# Patient Record
Sex: FEMALE | Race: WHITE | NOT HISPANIC OR LATINO | Employment: OTHER | URBAN - METROPOLITAN AREA
[De-identification: names, ages, dates, MRNs, and addresses within clinical notes are randomized per-mention and may not be internally consistent; named-entity substitution may affect disease eponyms.]

---

## 2017-01-01 ENCOUNTER — NURSING HOME VISIT (OUTPATIENT)
Dept: GERIATRICS | Facility: CLINIC | Age: 82
End: 2017-01-01
Payer: MEDICARE

## 2017-01-01 ENCOUNTER — NURSING HOME VISIT (OUTPATIENT)
Dept: FAMILY MEDICINE | Facility: CLINIC | Age: 82
End: 2017-01-01
Payer: MEDICARE

## 2017-01-01 ENCOUNTER — ALLIED HEALTH/NURSE VISIT (OUTPATIENT)
Dept: CARDIOLOGY | Facility: CLINIC | Age: 82
End: 2017-01-01
Payer: MEDICARE

## 2017-01-01 ENCOUNTER — OFFICE VISIT (OUTPATIENT)
Dept: OPHTHALMOLOGY | Facility: CLINIC | Age: 82
End: 2017-01-01

## 2017-01-01 VITALS
RESPIRATION RATE: 18 BRPM | DIASTOLIC BLOOD PRESSURE: 77 MMHG | HEIGHT: 62 IN | SYSTOLIC BLOOD PRESSURE: 151 MMHG | OXYGEN SATURATION: 95 % | TEMPERATURE: 97.8 F | HEART RATE: 65 BPM | WEIGHT: 134.5 LBS | BODY MASS INDEX: 24.75 KG/M2

## 2017-01-01 VITALS
OXYGEN SATURATION: 96 % | HEIGHT: 62 IN | TEMPERATURE: 97.8 F | BODY MASS INDEX: 25.03 KG/M2 | RESPIRATION RATE: 16 BRPM | HEART RATE: 60 BPM | WEIGHT: 136 LBS | DIASTOLIC BLOOD PRESSURE: 87 MMHG | SYSTOLIC BLOOD PRESSURE: 147 MMHG

## 2017-01-01 VITALS
HEART RATE: 64 BPM | DIASTOLIC BLOOD PRESSURE: 75 MMHG | HEIGHT: 62 IN | SYSTOLIC BLOOD PRESSURE: 147 MMHG | OXYGEN SATURATION: 100 % | RESPIRATION RATE: 16 BRPM | BODY MASS INDEX: 23.65 KG/M2 | TEMPERATURE: 95 F | WEIGHT: 128.5 LBS

## 2017-01-01 VITALS
HEART RATE: 68 BPM | HEIGHT: 62 IN | DIASTOLIC BLOOD PRESSURE: 79 MMHG | RESPIRATION RATE: 16 BRPM | SYSTOLIC BLOOD PRESSURE: 136 MMHG | WEIGHT: 133.35 LBS | TEMPERATURE: 97.9 F | BODY MASS INDEX: 24.54 KG/M2 | OXYGEN SATURATION: 96 %

## 2017-01-01 VITALS
RESPIRATION RATE: 16 BRPM | SYSTOLIC BLOOD PRESSURE: 157 MMHG | BODY MASS INDEX: 24.48 KG/M2 | HEART RATE: 61 BPM | OXYGEN SATURATION: 96 % | HEIGHT: 62 IN | DIASTOLIC BLOOD PRESSURE: 65 MMHG | WEIGHT: 133 LBS | TEMPERATURE: 98.2 F

## 2017-01-01 DIAGNOSIS — E11.51 TYPE 2 DIABETES MELLITUS WITH PERIPHERAL VASCULAR DISEASE (H): ICD-10-CM

## 2017-01-01 DIAGNOSIS — I12.9 SECONDARY DM WITH CKD STAGE 3 AND HYPERTENSION (H): Primary | ICD-10-CM

## 2017-01-01 DIAGNOSIS — E11.22 TYPE 2 DIABETES MELLITUS WITH STAGE 3 CHRONIC KIDNEY DISEASE, WITH LONG-TERM CURRENT USE OF INSULIN (H): ICD-10-CM

## 2017-01-01 DIAGNOSIS — I10 HTN, GOAL BELOW 150/90: ICD-10-CM

## 2017-01-01 DIAGNOSIS — E08.3212: Primary | ICD-10-CM

## 2017-01-01 DIAGNOSIS — Z79.4 TYPE 2 DIABETES MELLITUS WITH STAGE 3 CHRONIC KIDNEY DISEASE, WITH LONG-TERM CURRENT USE OF INSULIN (H): ICD-10-CM

## 2017-01-01 DIAGNOSIS — R05.9 COUGH: ICD-10-CM

## 2017-01-01 DIAGNOSIS — Z89.512 STATUS POST BILATERAL BELOW KNEE AMPUTATION (H): Primary | ICD-10-CM

## 2017-01-01 DIAGNOSIS — F22 PARANOIA (H): ICD-10-CM

## 2017-01-01 DIAGNOSIS — R45.89 MOODY: ICD-10-CM

## 2017-01-01 DIAGNOSIS — Z89.511 S/P BILATERAL BELOW KNEE AMPUTATION (H): ICD-10-CM

## 2017-01-01 DIAGNOSIS — Z89.512 STATUS POST BILATERAL BELOW KNEE AMPUTATION (H): ICD-10-CM

## 2017-01-01 DIAGNOSIS — E08.3291 MILD NONPROLIFERATIVE DIABETIC RETINOPATHY OF RIGHT EYE WITHOUT MACULAR EDEMA ASSOCIATED WITH DIABETES MELLITUS DUE TO UNDERLYING CONDITION (H): ICD-10-CM

## 2017-01-01 DIAGNOSIS — F03.91 DEMENTIA WITH BEHAVIORAL DISTURBANCE, UNSPECIFIED DEMENTIA TYPE: ICD-10-CM

## 2017-01-01 DIAGNOSIS — M54.42 CHRONIC LEFT-SIDED LOW BACK PAIN WITH LEFT-SIDED SCIATICA: ICD-10-CM

## 2017-01-01 DIAGNOSIS — I12.9 HYPERTENSIVE RENAL DISEASE, STAGE 1 THROUGH STAGE 4 OR UNSPECIFIED CHRONIC KIDNEY DISEASE: ICD-10-CM

## 2017-01-01 DIAGNOSIS — N18.30 SECONDARY DM WITH CKD STAGE 3 AND HYPERTENSION (H): Primary | ICD-10-CM

## 2017-01-01 DIAGNOSIS — E13.22 SECONDARY DM WITH CKD STAGE 3 AND HYPERTENSION (H): Primary | ICD-10-CM

## 2017-01-01 DIAGNOSIS — I12.9 SECONDARY DM WITH CKD STAGE 3 AND HYPERTENSION (H): ICD-10-CM

## 2017-01-01 DIAGNOSIS — H35.81 DIABETIC MACULAR EDEMA, LEFT EYE (H): ICD-10-CM

## 2017-01-01 DIAGNOSIS — N18.30 SECONDARY DM WITH CKD STAGE 3 AND HYPERTENSION (H): ICD-10-CM

## 2017-01-01 DIAGNOSIS — Z89.511 STATUS POST BILATERAL BELOW KNEE AMPUTATION (H): Primary | ICD-10-CM

## 2017-01-01 DIAGNOSIS — R09.89 RUNNY NOSE: Primary | ICD-10-CM

## 2017-01-01 DIAGNOSIS — E11.311 DIABETIC MACULAR EDEMA, LEFT EYE (H): ICD-10-CM

## 2017-01-01 DIAGNOSIS — Z89.512 S/P BILATERAL BELOW KNEE AMPUTATION (H): ICD-10-CM

## 2017-01-01 DIAGNOSIS — Z95.0 CARDIAC PACEMAKER IN SITU: Primary | ICD-10-CM

## 2017-01-01 DIAGNOSIS — E11.51 TYPE 2 DIABETES MELLITUS WITH PERIPHERAL VASCULAR DISEASE (H): Primary | ICD-10-CM

## 2017-01-01 DIAGNOSIS — Z89.511 STATUS POST BILATERAL BELOW KNEE AMPUTATION (H): ICD-10-CM

## 2017-01-01 DIAGNOSIS — F01.518 VASCULAR DEMENTIA WITH BEHAVIOR DISTURBANCE (H): ICD-10-CM

## 2017-01-01 DIAGNOSIS — N18.30 TYPE 2 DIABETES MELLITUS WITH STAGE 3 CHRONIC KIDNEY DISEASE, WITH LONG-TERM CURRENT USE OF INSULIN (H): ICD-10-CM

## 2017-01-01 DIAGNOSIS — I50.32 CHRONIC DIASTOLIC CONGESTIVE HEART FAILURE (H): ICD-10-CM

## 2017-01-01 DIAGNOSIS — G89.29 CHRONIC LEFT-SIDED LOW BACK PAIN WITH LEFT-SIDED SCIATICA: ICD-10-CM

## 2017-01-01 DIAGNOSIS — E13.22 SECONDARY DM WITH CKD STAGE 3 AND HYPERTENSION (H): ICD-10-CM

## 2017-01-01 PROCEDURE — 93293 PM PHONE R-STRIP DEVICE EVAL: CPT | Performed by: INTERNAL MEDICINE

## 2017-01-01 PROCEDURE — 99309 SBSQ NF CARE MODERATE MDM 30: CPT | Performed by: NURSE PRACTITIONER

## 2017-01-01 PROCEDURE — 99309 SBSQ NF CARE MODERATE MDM 30: CPT | Performed by: INTERNAL MEDICINE

## 2017-01-01 ASSESSMENT — TONOMETRY
OD_IOP_MMHG: 18
OS_IOP_MMHG: 17
IOP_METHOD: ICARE

## 2017-01-01 ASSESSMENT — VISUAL ACUITY
OS_CC+: +2
CORRECTION_TYPE: GLASSES
OD_CC: 20/40
METHOD: SNELLEN - LINEAR
OD_CC+: -2
OS_CC: 20/60

## 2017-01-01 ASSESSMENT — SLIT LAMP EXAM - LIDS
COMMENTS: NORMAL
COMMENTS: NORMAL

## 2017-01-01 ASSESSMENT — CUP TO DISC RATIO
OS_RATIO: 0.3
OD_RATIO: 0.3

## 2017-01-01 ASSESSMENT — CONF VISUAL FIELD
METHOD: COUNTING FINGERS
OS_NORMAL: 1

## 2017-01-01 ASSESSMENT — EXTERNAL EXAM - LEFT EYE: OS_EXAM: NORMAL

## 2017-01-01 ASSESSMENT — EXTERNAL EXAM - RIGHT EYE: OD_EXAM: NORMAL

## 2017-01-02 ENCOUNTER — NURSING HOME VISIT (OUTPATIENT)
Dept: GERIATRICS | Facility: CLINIC | Age: 82
End: 2017-01-02
Payer: MEDICARE

## 2017-01-02 VITALS
SYSTOLIC BLOOD PRESSURE: 130 MMHG | BODY MASS INDEX: 25.47 KG/M2 | HEART RATE: 68 BPM | OXYGEN SATURATION: 96 % | RESPIRATION RATE: 16 BRPM | DIASTOLIC BLOOD PRESSURE: 50 MMHG | WEIGHT: 138.4 LBS | HEIGHT: 62 IN

## 2017-01-02 DIAGNOSIS — G47.00 PERSISTENT INSOMNIA: ICD-10-CM

## 2017-01-02 DIAGNOSIS — F01.50 VASCULAR DEMENTIA WITHOUT BEHAVIORAL DISTURBANCE (H): ICD-10-CM

## 2017-01-02 DIAGNOSIS — Z89.519: ICD-10-CM

## 2017-01-02 DIAGNOSIS — G89.29 CHRONIC LEFT-SIDED LOW BACK PAIN WITH LEFT-SIDED SCIATICA: Primary | ICD-10-CM

## 2017-01-02 DIAGNOSIS — E11.51 TYPE 2 DIABETES MELLITUS WITH PERIPHERAL VASCULAR DISEASE (H): ICD-10-CM

## 2017-01-02 DIAGNOSIS — R73.9 HYPERGLYCEMIA: ICD-10-CM

## 2017-01-02 DIAGNOSIS — M54.42 CHRONIC LEFT-SIDED LOW BACK PAIN WITH LEFT-SIDED SCIATICA: Primary | ICD-10-CM

## 2017-01-02 PROCEDURE — 99207 ZZC CDG-UP CODE -MED NECESSITY: CPT | Performed by: NURSE PRACTITIONER

## 2017-01-02 PROCEDURE — 99310 SBSQ NF CARE HIGH MDM 45: CPT | Performed by: NURSE PRACTITIONER

## 2017-01-02 RX ORDER — ACETAMINOPHEN 500 MG
500 TABLET ORAL 3 TIMES DAILY
COMMUNITY
End: 2018-01-01

## 2017-01-02 NOTE — PROGRESS NOTES
[facility]    Chief Complaint   Patient presents with     shelter Regulatory       HPI:    Cinthia Whittington is a 89 year old  (11/5/1924), who is being seen today for a federally mandated E/M visit at [facility]. Today's concerns are:  Chronic left-sided low back pain with left-sided sciatica  She stated that her pain has resolved> this is since I restarted the Prednisone 2.5 mg daily    Persistent insomnia  She stated that overall she sleeps much better, but did not sleep all that well last night    Hyperglycemia  BGTs fasting ~ 120, noon ~ 170 occ higher; 4:30 pm ~ 220 and occ in the 300's    Type 2 diabetes mellitus with peripheral vascular disease (H) diabetes since 1980  A1C      7.6   9/14/2016  A1C      8.2   6/22/2016   has been getting ~ 5 units of additional Novolog daily    History of amputation below knee, unspecified laterality (H)  She does wear mason prosthesis and is capable of putting them on with stand by assist    Vascular dementia without behavioral disturbance  Is forgetful      ALLERGIES: Lisinopril  PAST MEDICAL HISTORY:  has a past medical history of Diabetes mellitus (H); HTN; Hypercholesteremia; Lymphedema; Dementia; Anemia of chronic disease; PVD (peripheral vascular disease) (H) (2009); GERD (gastroesophageal reflux disease); Posterior vitreous detachment of both eyes; Carotid artery disease (H); Hearing loss; Diabetic neuropathy (H); Type 2 diabetes mellitus, uncontrolled (H); Multiple thyroid nodules; Diabetic macular edema (H); and ASCVD (arteriosclerotic cardiovascular disease) (10/15/2010).  PAST SURGICAL HISTORY:  has past surgical history that includes SPINE FUSION,ANTER,3 SGMTS; hysterectomy, pap no longer indicated; SLING OPERATION FOR STRESS INCONTINENCE; VEIN IN SITU BYPASS GRAFT,FEM-POP (4/9/10); Amputate leg below knee (1/30/2013); cataract iol, rt/lt (2-29-00,3-27-00); Heart Cath, Angioplasty (8/2010); Amputate leg below  knee (2010); and Eye surgery.  FAMILY HISTORY: family history includes DIABETES in her mother; Family History Negative in her brother, brother, brother, daughter, and son; Respiratory in her father.  SOCIAL HISTORY:  reports that she quit smoking about 37 years ago. She has never used smokeless tobacco. She reports that she drinks about 1.8 oz of alcohol per week. She reports that she does not use illicit drugs.    MEDICATIONS:  Current Outpatient Prescriptions   Medication Sig Dispense Refill     acetaminophen (TYLENOL) 500 MG tablet Take 500 mg by mouth 4 times daily       Multiple Vitamins-Minerals (MULTIVITAMIN PO) Take 1 Tablespoonful by mouth daily (with breakfast) With iron       PREDNISONE PO Take 2.5 mg by mouth every morning       diclofenac (VOLTAREN) 1 % GEL topical gel Apply 2 g topically 4 times daily Apply to L side of low back and into posterior L thigh       Glycerin, Laxative, (GLYCERIN, ADULT,) 80.7 % SUPP Place 1 suppository rectally daily as needed (constipation)       amLODIPine (NORVASC) 10 MG tablet Take 10 mg by mouth daily at 5PM       insulin aspart (INSULIN ASPART) 100 UNIT/ML soln Inject Subcutaneous 3 times daily (with meals) Sliding scale coverage>  to 250> 3 units; 251 to 300> 6 units; 301 to 350> 9 units; 351 to 400> 12 units; 401 + give 15 units       LOSARTAN POTASSIUM PO Take 100 mg by mouth daily Hold for SBP <115       acetaminophen (TYLENOL) 500 MG tablet Take 500 mg by mouth every 4 hours (while awake) And q 4 hrs prn> max g / 24 hrs       SIMVASTATIN PO Take 10 mg by mouth At Bedtime        melatonin 1 MG TABS Take 1 tablet (1 mg) by mouth At Bedtime       senna-docusate (SENOKOT-S;PERICOLACE) 8.6-50 MG per tablet Take 1 tablet by mouth 2 times daily 100 tablet      cholecalciferol (VITAMIN D3) 1000 UNIT tablet Take 1,000 Units by mouth daily       insulin glargine (LANTUS) 100 UNIT/ML PEN Inject 30 Units Subcutaneous every morning Raise dose by one unit if AM glucoses  "are over 130.       bisacodyl (DULCOLAX) 5 MG EC tablet Take 5 mg by mouth 2 times daily as needed for constipation       clopidogrel (PLAVIX) 75 MG tablet Take 1 tablet (75 mg) by mouth daily 90 tablet 1     donepezil (ARICEPT) 10 MG tablet Take 1 tablet (10 mg) by mouth At Bedtime 90 tablet 1     carvedilol (COREG) 12.5 MG tablet Take 1 tablet (12.5 mg) by mouth 2 times daily (with meals) 180 tablet 1     hydrALAZINE (APRESOLINE) 50 MG tablet Take 1 tablet (50 mg) by mouth 3 times daily Take at 8AM, at 2PM, and at 9PM. 90 tablet 1     fluticasone (FLONASE) 50 MCG/ACT nasal spray Spray 2 sprays into both nostrils At Bedtime 48 g 3     nitroglycerin (NITROSTAT) 0.4 MG SL tablet Place 1 tablet (0.4 mg) under the tongue every 5 minutes as needed for chest pain If still having symptoms after 3 doses (15 minutes) call 911. 25 tablet 0     polyethylene glycol 0.4%- propylene glycol 0.3% (SYSTANE) 0.4-0.3 % SOLN ophthalmic solution Place 1 drop into both eyes At Bedtime         Case Management:  I have reviewed the care plan and MDS and do agree with the plan. Patient's desire to return to the community is present, but is not able due to care needs .  Information reviewed:  Medications, vital signs, orders, and nursing notes.    ROS:  4 point ROS including Respiratory, CV, GI and , other than that noted in the HPI,  is negative    Exam:  /50 mmHg  Pulse 68  Resp 16  Ht 5' 1.5\" (1.562 m)  Wt 138 lb 6.4 oz (62.778 kg)  BMI 25.73 kg/m2  SpO2 96%  GENERAL APPEARANCE:  Alert, in no distress, cooperative  ENT:  Mouth and posterior oropharynx normal, moist mucous membranes, [place], does well in 1:1  EYES:  EOM, conjunctivae, lids, pupils and irises normal, impaired vision, but has nice eye contact  RESP:  respiratory effort and palpation of chest normal, lungs clear to auscultation , no respiratory distress  CV:  Palpation and auscultation of heart done , regular rate and rhythm, no murmur, rub, or gallop, no " edema  ABDOMEN:  normal bowel sounds, soft, nontender, no hepatosplenomegaly or other masses  M/S:   Gait and station abnormal >Depends on staff for transfers, is transported by staff with a w/c  SKIN:  Soft and intact  NEURO:   Cranial nerves 2-12 are normal tested and grossly at patient's baseline  PSYCH:  oriented to herself, insight and judgement impaired, memory impaired , affect and mood normal> she is slowly adjusting to LTC    Lab/Diagnostic data:    Last Basic Metabolic Panel:  NA      143   12/12/2016   POTASSIUM      4.2   12/12/2016  CHLORIDE      110   12/12/2016  VIDHI      9.0   12/12/2016  CO2       26   12/12/2016  BUN       32   12/12/2016  BUN     21.2   2/7/2014  CR     1.39   12/12/2016  GLC       76   12/12/2016    HEMOGLOBIN   Date Value Ref Range Status   12/12/2016 8.9* 11.7 - 15.7 g/dL Final     12- ESR 39     ASSESSMENT/PLAN  Chronic left-sided low back pain with left-sided sciatica  Has resolved since I restarted prednisone 2.5 mg po daily  No change in tx    Persistent insomnia  Is not persistent anymore> no change in tx    Hyperglycemia  I did increase the Lantus insulin to 30 unit s/c q am  Will cont with the current Novolog orders    Type 2 diabetes mellitus with peripheral vascular disease (H) diabetes since 1980  I did increase the Lantus insulin to 30 unit s/c q am  Will cont with the current Novolog orders    History of amputation below knee, unspecified laterality (H)  Her son stated today that he will cont prosthetics for new stockings     Vascular dementia without behavioral disturbance  I did stop the prn Seroquel> she has not used it    I also changed her to Multivit with iron 1 tab daily> will obtain Hgb late in Jan 2017    I talked with her son and he agreed to the orders  Orders:  See A&P    Total time spent with patient visit was 40 min including patient visit and review of past records.Greater than 50% of total time spent with counseling and coordinating  care.    Electronically signed by:  AL Kim CNP

## 2017-01-11 ENCOUNTER — DOCUMENTATION ONLY (OUTPATIENT)
Dept: OTHER | Facility: CLINIC | Age: 82
End: 2017-01-11

## 2017-01-11 DIAGNOSIS — Z71.89 ADVANCED DIRECTIVES, COUNSELING/DISCUSSION: Primary | Chronic | ICD-10-CM

## 2017-01-23 ENCOUNTER — TRANSFERRED RECORDS (OUTPATIENT)
Dept: HEALTH INFORMATION MANAGEMENT | Facility: CLINIC | Age: 82
End: 2017-01-23

## 2017-01-25 ENCOUNTER — HOSPITAL LABORATORY (OUTPATIENT)
Dept: OTHER | Facility: CLINIC | Age: 82
End: 2017-01-25

## 2017-01-25 ENCOUNTER — TELEPHONE (OUTPATIENT)
Dept: INTERNAL MEDICINE | Facility: CLINIC | Age: 82
End: 2017-01-25

## 2017-01-25 LAB — HGB BLD-MCNC: 8.7 G/DL (ref 11.7–15.7)

## 2017-01-25 NOTE — Clinical Note
[facility]  [address] [place] [address]  [phone]    2017    Regarding:  Cinthia Whittington  [address]  [facility] [address]  : 1924          To whom it may concern:     Cinthia Whittington was a patient of Dr. Carlos A Hitchcock from 2-10-16 through 16. Her son Antwon was a full-time caregiver to Ms Whittington, living in her home.    If you have any further questions or problems, please contact our office.    Sincerely,      CARLOS A HITCHCOCK M.D.

## 2017-01-25 NOTE — TELEPHONE ENCOUNTER
Pt's son--Antwon calling.  [facility] is asking Antwon for a letter from pt's physician stating pt was a patient of Dr. Cheng from 2-10-16 thru 9-21-16 and that Antwon was a full-time caregiver to pt living in her home.    Last OV 9-21-16    Please call son when letter ready to be picked up.    Please advise, thanks.

## 2017-01-26 NOTE — PROGRESS NOTES
[place] [facility]    Chief Complaint   Patient presents with     Paranoid       HPI:    Cinthia Whittington is a 89 year old  (11/5/1924), who is being seen today for an episodic care visit at [facility]. 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    Current Outpatient Prescriptions   Medication Sig Dispense Refill     QUEtiapine Fumarate (SEROQUEL PO) Take 12.5 mg by mouth At Bedtime       pneumococcal (PREVNAR 13) SUSP injection Inject 0.5 mLs into the muscle once       fluticasone (FLONASE) 50 MCG/ACT spray Spray 1 spray into both nostrils 2 times daily       acetaminophen (TYLENOL) 500 MG tablet Take 500 mg by mouth 4 times daily       Multiple Vitamins-Minerals (MULTIVITAMIN PO) Take 1 Tablespoonful by mouth daily (with breakfast) With iron       PREDNISONE PO Take 2.5 mg by mouth every morning       diclofenac (VOLTAREN) 1 % GEL topical gel Apply 2 g topically 4 times daily Apply to L side of low back and into posterior L thigh       Glycerin, Laxative, (GLYCERIN, ADULT,) 80.7 % SUPP Place 1 suppository rectally daily as needed (constipation)       amLODIPine (NORVASC) 10 MG tablet Take 10 mg by mouth daily at 5PM       insulin aspart (INSULIN ASPART) 100 UNIT/ML soln Inject Subcutaneous 3 times  daily (with meals) Sliding scale coverage>  to 250> 3 units; 251 to 300> 6 units; 301 to 350> 9 units; 351 to 400> 12 units; 401 + give 15 units       LOSARTAN POTASSIUM PO Take 100 mg by mouth daily Hold for SBP <115       acetaminophen (TYLENOL) 500 MG tablet Take 500 mg by mouth every 4 hours (while awake) And q 4 hrs prn> max g / 24 hrs       SIMVASTATIN PO Take 10 mg by mouth At Bedtime        senna-docusate (SENOKOT-S;PERICOLACE) 8.6-50 MG per tablet Take 1 tablet by mouth 2 times daily 100 tablet      cholecalciferol (VITAMIN D3) 1000 UNIT tablet Take 1,000 Units by mouth daily       insulin glargine (LANTUS) 100 UNIT/ML PEN Inject 30 Units Subcutaneous every morning Raise dose by one unit if AM glucoses are over 130.       bisacodyl (DULCOLAX) 5 MG EC tablet Take 5 mg by mouth 2 times daily as needed for constipation       clopidogrel (PLAVIX) 75 MG tablet Take 1 tablet (75 mg) by mouth daily 90 tablet 1     donepezil (ARICEPT) 10 MG tablet Take 1 tablet (10 mg) by mouth At Bedtime 90 tablet 1     carvedilol (COREG) 12.5 MG tablet Take 1 tablet (12.5 mg) by mouth 2 times daily (with meals) 180 tablet 1     hydrALAZINE (APRESOLINE) 50 MG tablet Take 1 tablet (50 mg) by mouth 3 times daily Take at 8AM, at 2PM, and at 9PM. 90 tablet 1     nitroglycerin (NITROSTAT) 0.4 MG SL tablet Place 1 tablet (0.4 mg) under the tongue every 5 minutes as needed for chest pain If still having symptoms after 3 doses (15 minutes) call 911. 25 tablet 0     polyethylene glycol 0.4%- propylene glycol 0.3% (SYSTANE) 0.4-0.3 % SOLN ophthalmic solution Place 1 drop into both eyes At Bedtime       Medications reviewed:  Medications reconciled to facility chart and changes were made to reflect current medications as identified as above med list. Below are the changes that were made:   Medications stopped since last EPIC medication reconciliation:   There are no discontinued medications.    Medications started since last EPIC  "medication reconciliation:  No orders of the defined types were placed in this encounter.     REVIEW OF SYSTEMS:  4 point ROS including Respiratory, CV, GI and , other than that noted in the HPI,  is negative    Physical Exam:  /70 mmHg  Pulse 72  Temp(Src) 97.6  F (36.4  C)  Resp 18  Ht 5' 1.5\" (1.562 m)  Wt 134 lb (60.782 kg)  BMI 24.91 kg/m2  SpO2 96%  GENERAL APPEARANCE:  Alert, anxious, very angry because she felt not treated well this am, she then became angry with me also  ENT:  Mouth and posterior oropharynx normal, moist mucous membranes, [place]  EYES:  EOM, conjunctivae, lids, pupils and irises normal, very impaired vision  RESP:  respiratory effort and palpation of chest normal, lungs clear to auscultation , no respiratory distress  CV:  Palpation and auscultation of heart done , no edema, rate-normal, grade 3/6 murmur  ABDOMEN:  normal bowel sounds, soft, nontender, no hepatosplenomegaly or other masses  M/S:   Gait and station abnormal > she wear mason BKA and transfers with assist, does not walk  SKIN:  Has fragile skin in coccyx area  NEURO:   Cranial nerves 2-12 are normal tested and grossly at patient's baseline  PSYCH:  oriented to herself, insight and judgement impaired, memory impaired , her mood is volatile and labile, has paranoid thinking    Recent Labs:      Last Basic Metabolic Panel:  NA      143   12/12/2016   POTASSIUM      4.2   12/12/2016  CHLORIDE      110   12/12/2016  VIDHI      9.0   12/12/2016  CO2       26   12/12/2016  BUN       32   12/12/2016  BUN     21.2   2/7/2014  CR     1.39   12/12/2016  GLC       76   12/12/2016    WBC      4.5   11/14/2016  RBC     3.03   11/14/2016  HGB      8.7   1/25/2017  HCT     28.1   11/14/2016  MCV       93   11/14/2016  MCH     30.0   11/14/2016  MCHC     32.4   11/14/2016  RDW     12.8   11/14/2016  PLT      190   11/14/2016      Assessment/Plan:  Secondary DM with CKD stage 3 and hypertension (H)  Will cont with current tx    Type 2 " diabetes mellitus with stage 3 chronic kidney disease, with long-term current use of insulin (H)  No change in insulin  Will obtain A1c on 1-    Dementia associated with other underlying disease with behavioral disturbance  No short term memory> no change in her dementia meds  I did stop the Remeron 7.5 mg which she started on 1-> I believe it caused altered thinking    Paranoia (H)  I did start Seroquel 12.5 mg po q hs> hope that she sleeps well and paranoia will diminish    Chronic left-sided low back pain with left-sided sciatica  Will cont with current low dose Prednisone      Orders:  See A&P    Time 40 min    Electronically signed by  AL Kim CNP

## 2017-01-27 ENCOUNTER — DOCUMENTATION ONLY (OUTPATIENT)
Dept: CARDIOLOGY | Facility: CLINIC | Age: 82
End: 2017-01-27

## 2017-01-27 ENCOUNTER — NURSING HOME VISIT (OUTPATIENT)
Dept: GERIATRICS | Facility: CLINIC | Age: 82
End: 2017-01-27
Payer: MEDICARE

## 2017-01-27 VITALS
HEIGHT: 62 IN | WEIGHT: 134 LBS | OXYGEN SATURATION: 96 % | RESPIRATION RATE: 18 BRPM | HEART RATE: 72 BPM | SYSTOLIC BLOOD PRESSURE: 140 MMHG | DIASTOLIC BLOOD PRESSURE: 70 MMHG | TEMPERATURE: 97.6 F | BODY MASS INDEX: 24.66 KG/M2

## 2017-01-27 DIAGNOSIS — I12.9 SECONDARY DM WITH CKD STAGE 3 AND HYPERTENSION (H): Primary | ICD-10-CM

## 2017-01-27 DIAGNOSIS — N18.30 SECONDARY DM WITH CKD STAGE 3 AND HYPERTENSION (H): Primary | ICD-10-CM

## 2017-01-27 DIAGNOSIS — E11.22 TYPE 2 DIABETES MELLITUS WITH STAGE 3 CHRONIC KIDNEY DISEASE, WITH LONG-TERM CURRENT USE OF INSULIN (H): ICD-10-CM

## 2017-01-27 DIAGNOSIS — F22 PARANOIA (H): ICD-10-CM

## 2017-01-27 DIAGNOSIS — E13.22 SECONDARY DM WITH CKD STAGE 3 AND HYPERTENSION (H): Primary | ICD-10-CM

## 2017-01-27 DIAGNOSIS — Z79.4 TYPE 2 DIABETES MELLITUS WITH STAGE 3 CHRONIC KIDNEY DISEASE, WITH LONG-TERM CURRENT USE OF INSULIN (H): ICD-10-CM

## 2017-01-27 DIAGNOSIS — G89.29 CHRONIC LEFT-SIDED LOW BACK PAIN WITH LEFT-SIDED SCIATICA: ICD-10-CM

## 2017-01-27 DIAGNOSIS — F02.818 DEMENTIA ASSOCIATED WITH OTHER UNDERLYING DISEASE WITH BEHAVIORAL DISTURBANCE (H): ICD-10-CM

## 2017-01-27 DIAGNOSIS — N18.30 TYPE 2 DIABETES MELLITUS WITH STAGE 3 CHRONIC KIDNEY DISEASE, WITH LONG-TERM CURRENT USE OF INSULIN (H): ICD-10-CM

## 2017-01-27 DIAGNOSIS — M54.42 CHRONIC LEFT-SIDED LOW BACK PAIN WITH LEFT-SIDED SCIATICA: ICD-10-CM

## 2017-01-27 PROBLEM — E11.311 DIABETIC MACULAR EDEMA, LEFT EYE (H): Status: ACTIVE | Noted: 2017-01-27

## 2017-01-27 PROBLEM — H35.81 DIABETIC MACULAR EDEMA, LEFT EYE (H): Status: ACTIVE | Noted: 2017-01-27

## 2017-01-27 PROBLEM — E11.319 DIABETIC RETINOPATHY OF BOTH EYES (H): Status: ACTIVE | Noted: 2017-01-27

## 2017-01-27 PROCEDURE — 99309 SBSQ NF CARE MODERATE MDM 30: CPT | Performed by: NURSE PRACTITIONER

## 2017-01-27 PROCEDURE — 99207 ZZC CDG-CORRECTLY CODED, REVIEWED AND AGREE: CPT | Performed by: NURSE PRACTITIONER

## 2017-01-30 ENCOUNTER — HOSPITAL LABORATORY (OUTPATIENT)
Dept: OTHER | Facility: CLINIC | Age: 82
End: 2017-01-30

## 2017-01-30 LAB — HBA1C MFR BLD: 6.5 % (ref 4.3–6)

## 2017-01-31 NOTE — TELEPHONE ENCOUNTER
Letter in green folder at [facility]. Does son want to  or have it mailed?  Brynn BYRD

## 2017-02-01 ENCOUNTER — ALLIED HEALTH/NURSE VISIT (OUTPATIENT)
Dept: CARDIOLOGY | Facility: CLINIC | Age: 82
End: 2017-02-01
Payer: MEDICARE

## 2017-02-01 ENCOUNTER — TELEPHONE (OUTPATIENT)
Dept: CARDIOLOGY | Facility: CLINIC | Age: 82
End: 2017-02-01

## 2017-02-01 DIAGNOSIS — Z95.0 CARDIAC PACEMAKER IN SITU: Primary | ICD-10-CM

## 2017-02-01 PROCEDURE — 93280 PM DEVICE PROGR EVAL DUAL: CPT | Performed by: INTERNAL MEDICINE

## 2017-02-01 NOTE — TELEPHONE ENCOUNTER
Name of person picking up: Antwon Whittington     If not patient, relationship to patient: Son    Type of identification: ANNETTE CHAVEZ #: [id number]    What was picked up: Letter

## 2017-02-01 NOTE — TELEPHONE ENCOUNTER
Patient's son Antwon, left a note, he is requesting a letter from Dr. Adams. Antwon's  had asked him to get a letter with a physician signature. Letter must state that he was the sole caregiver for his mother at her home for two years or more or she would have to be placed in long term care. The letter would be sent to [facility] with other forms, for confirmation. The letter must have a signature.     Terrie MACHUCA reviewed request with Dr. Adams. Dr. Adams stated he can not write this letter since he has no way of knowing her son was her sole caregiver.     Called Antwon, Antwon gave us permission to leave a voicemail, if there was no answer. I left a message, stating that Dr. Adams is not able to write the letter. Dr. Adams has not seen the patient since 01-19-16. I recommended he request a letter from patient's primary care doctor.      Shortly after leaving the message. Patient was here for her device check and Antwon was with her. I reviewed this with Antwon. He stated patient has only seen Dr. Lujan for two months. I recommended he contact Dr. Lujan to see if she will write the letter or contact patient's previous primary care doctors.      I apologized to patient and to Antwon, that we were not able to assist them.     Ankita MACHUCA  [facility]

## 2017-02-01 NOTE — PROGRESS NOTES
[employer]rua Pacemaker Device Check  AP: 37 % : 10 %  Mode: DDD 60        Underlying Rhythm: NSR  Heart Rate: Stable with adequate variability  Sensing: Stable    Pacing Threshold: Stable   Impedance: Stable  Battery Status: Greater than 5 yrs  Atrial Arrhythmia: None  Ventricular Arrhythmia: None  Setting Change: None    Care Plan: Pt has no complaints. Is w/c bound and accompanied by son today. Resides at [facility]. Next phone teletrace scheduled for May. JOSUÉ Baxter RN.

## 2017-02-03 ENCOUNTER — NURSING HOME VISIT (OUTPATIENT)
Dept: FAMILY MEDICINE | Facility: CLINIC | Age: 82
End: 2017-02-03
Payer: MEDICARE

## 2017-02-03 VITALS
HEIGHT: 62 IN | DIASTOLIC BLOOD PRESSURE: 59 MMHG | BODY MASS INDEX: 24.66 KG/M2 | SYSTOLIC BLOOD PRESSURE: 152 MMHG | WEIGHT: 134 LBS | HEART RATE: 72 BPM | OXYGEN SATURATION: 96 % | TEMPERATURE: 97.6 F | RESPIRATION RATE: 18 BRPM

## 2017-02-03 DIAGNOSIS — N18.30 SECONDARY DM WITH CKD STAGE 3 AND HYPERTENSION (H): ICD-10-CM

## 2017-02-03 DIAGNOSIS — I12.9 SECONDARY DM WITH CKD STAGE 3 AND HYPERTENSION (H): ICD-10-CM

## 2017-02-03 DIAGNOSIS — F22 PARANOIA (H): Primary | ICD-10-CM

## 2017-02-03 DIAGNOSIS — M54.42 CHRONIC LEFT-SIDED LOW BACK PAIN WITH LEFT-SIDED SCIATICA: ICD-10-CM

## 2017-02-03 DIAGNOSIS — S88.912S AMPUTATION OF BOTH LOWER EXTREMITIES, SEQUELA (H): ICD-10-CM

## 2017-02-03 DIAGNOSIS — E11.319 DIABETIC RETINOPATHY OF BOTH EYES (H): ICD-10-CM

## 2017-02-03 DIAGNOSIS — G89.29 CHRONIC LEFT-SIDED LOW BACK PAIN WITH LEFT-SIDED SCIATICA: ICD-10-CM

## 2017-02-03 DIAGNOSIS — S88.911S AMPUTATION OF BOTH LOWER EXTREMITIES, SEQUELA (H): ICD-10-CM

## 2017-02-03 DIAGNOSIS — E13.22 SECONDARY DM WITH CKD STAGE 3 AND HYPERTENSION (H): ICD-10-CM

## 2017-02-03 PROCEDURE — 99309 SBSQ NF CARE MODERATE MDM 30: CPT | Performed by: INTERNAL MEDICINE

## 2017-02-03 NOTE — PROGRESS NOTES
[place] [facility]    Chief Complaint   Patient presents with     FCI Regulatory       HPI:    Cinthia Whittington is a 89 year old  (11/5/1924), who is being seen today for a federally mandated E/M visit at [facility]. Today's concerns are:  1. Paranoia (H)    2. Secondary DM with CKD stage 3 and hypertension (H)    3. Diabetic retinopathy of both eyes (H) 1- stable per exam    4. Amputation of both lower extremities, sequela (H)    5. Chronic left-sided low back pain with left-sided sciatica        ALLERGIES: Lisinopril and Remeron  PAST MEDICAL HISTORY:  has a past medical history of Diabetes mellitus (H); HTN; Hypercholesteremia; Lymphedema; Dementia; Anemia of chronic disease; PVD (peripheral vascular disease) (H) (2009); GERD (gastroesophageal reflux disease); Posterior vitreous detachment of both eyes; Carotid artery disease (H); Hearing loss; Diabetic neuropathy (H); Type 2 diabetes mellitus, uncontrolled (H); Multiple thyroid nodules; Diabetic macular edema (H); and ASCVD (arteriosclerotic cardiovascular disease) (10/15/2010).  PAST SURGICAL HISTORY:  has past surgical history that includes SPINE FUSION,ANTER,3 SGMTS; hysterectomy, pap no longer indicated; SLING OPERATION FOR STRESS INCONTINENCE; VEIN IN SITU BYPASS GRAFT,FEM-POP (4/9/10); Amputate leg below knee (1/30/2013); cataract iol, rt/lt (2-29-00,3-27-00); Heart Cath, Angioplasty (8/2010); Amputate leg below knee (2010); Eye surgery; R TKA; and history of low back surgery.  FAMILY HISTORY: family history includes DIABETES in her mother; Family History Negative in her brother, brother, brother, daughter, and son; Respiratory in her father.  SOCIAL HISTORY:  reports that she quit smoking about 37 years ago. She has never used smokeless tobacco. She reports that she drinks about 1.8 oz of alcohol per week. She reports that she does not use illicit drugs.    MEDICATIONS:  Current Outpatient  Prescriptions   Medication Sig Dispense Refill     GUAIFENESIN PO Take 20 mLs by mouth every 3 hours as needed       QUEtiapine Fumarate (SEROQUEL PO) Take 12.5 mg by mouth At Bedtime       fluticasone (FLONASE) 50 MCG/ACT spray Spray 1 spray into both nostrils 2 times daily       acetaminophen (TYLENOL) 500 MG tablet Take 500 mg by mouth 4 times daily       Multiple Vitamins-Minerals (MULTIVITAMIN PO) Take 1 Tablespoonful by mouth daily (with breakfast) With iron       PREDNISONE PO Take 2.5 mg by mouth every morning       diclofenac (VOLTAREN) 1 % GEL topical gel Apply 2 g topically 4 times daily Apply to L side of low back and into posterior L thigh       Glycerin, Laxative, (GLYCERIN, ADULT,) 80.7 % SUPP Place 1 suppository rectally daily as needed (constipation)       amLODIPine (NORVASC) 10 MG tablet Take 10 mg by mouth daily at 5PM       insulin aspart (INSULIN ASPART) 100 UNIT/ML soln Inject Subcutaneous 3 times daily (with meals) Sliding scale coverage>  to 250> 3 units; 251 to 300> 6 units; 301 to 350> 9 units; 351 to 400> 12 units; 401 + give 15 units       LOSARTAN POTASSIUM PO Take 100 mg by mouth daily Hold for SBP <115       acetaminophen (TYLENOL) 500 MG tablet Take 500 mg by mouth every 4 hours (while awake) And q 4 hrs prn> max g / 24 hrs       SIMVASTATIN PO Take 10 mg by mouth At Bedtime        senna-docusate (SENOKOT-S;PERICOLACE) 8.6-50 MG per tablet Take 1 tablet by mouth 2 times daily 100 tablet      cholecalciferol (VITAMIN D3) 1000 UNIT tablet Take 1,000 Units by mouth daily       insulin glargine (LANTUS) 100 UNIT/ML PEN Inject 30 Units Subcutaneous every morning Raise dose by one unit if AM glucoses are over 130.       bisacodyl (DULCOLAX) 5 MG EC tablet Take 5 mg by mouth 2 times daily as needed for constipation       clopidogrel (PLAVIX) 75 MG tablet Take 1 tablet (75 mg) by mouth daily 90 tablet 1     donepezil (ARICEPT) 10 MG tablet Take 1 tablet (10 mg) by mouth At Bedtime 90  "tablet 1     carvedilol (COREG) 12.5 MG tablet Take 1 tablet (12.5 mg) by mouth 2 times daily (with meals) 180 tablet 1     hydrALAZINE (APRESOLINE) 50 MG tablet Take 1 tablet (50 mg) by mouth 3 times daily Take at 8AM, at 2PM, and at 9PM. 90 tablet 1     nitroglycerin (NITROSTAT) 0.4 MG SL tablet Place 1 tablet (0.4 mg) under the tongue every 5 minutes as needed for chest pain If still having symptoms after 3 doses (15 minutes) call 911. 25 tablet 0     polyethylene glycol 0.4%- propylene glycol 0.3% (SYSTANE) 0.4-0.3 % SOLN ophthalmic solution Place 1 drop into both eyes At Bedtime       Medications reviewed:  Medications reconciled to facility chart and changes were made to reflect current medications as identified as above med list. Below are the changes that were made:   Medications stopped since last EPIC medication reconciliation:   There are no discontinued medications.    Medications started since last [facility] medication reconciliation:  Orders Placed This Encounter   Medications     GUAIFENESIN PO     Sig: Take 20 mLs by mouth every 3 hours as needed         Information reviewed:  Medications, vital signs, orders, and nursing notes.    ROS:  4 point ROS including Respiratory- increased mucous, CV, GI and , myalgias and low back pain- OK with meds, psych- better with Seroquel other than that noted in the HPI,  is negative    Exam:  /59 mmHg  Pulse 72  Temp(Src) 97.6  F (36.4  C)  Resp 18  Ht 5' 1.5\" (1.562 m)  Wt 134 lb (60.782 kg)  BMI 24.91 kg/m2  SpO2 96%  GENERAL APPEARANCE: seated in wheelchair, dressed for the day; son is present; alert, conversant  RESP:  lungs clear to auscultation , no respiratory distress  CV:  regular rate and rhythm   ABDOMEN:  normal bowel sounds, soft, nontender,   M/S:   Bilateral prosthetics in place; no edema of her upper thighs  NEURO:   No lower extremity reflexes given bilateral above the knee amputations  PSYCH:  Alert, oriented;     Lab/Diagnostic data:  "     Last Basic Metabolic Panel:  NA      143   12/12/2016   POTASSIUM      4.2   12/12/2016  CHLORIDE      110   12/12/2016  VIDHI      9.0   12/12/2016  CO2       26   12/12/2016  BUN       32   12/12/2016  BUN     21.2   2/7/2014  CR     1.39   12/12/2016  GLC       76   12/12/2016    WBC      4.5   11/14/2016  RBC     3.03   11/14/2016  HGB      8.7   1/25/2017  HCT     28.1   11/14/2016  MCV       93   11/14/2016  MCH     30.0   11/14/2016  MCHC     32.4   11/14/2016  RDW     12.8   11/14/2016  PLT      190   11/14/2016    ASSESSMENT/PLAN  (F22) Paranoia (H)  (primary encounter diagnosis)  Comment: more significant symptpms after Remeron; she has since been changed to Seroquel.  Plan: Son notes improvement with Seroquel    (E13.22,  I12.9,  N18.3) Secondary DM with CKD stage 3 and hypertension (H)  Comment: diabetes meds unchanged  A1C      6.5   1/30/2017  A1C      7.6   9/14/2016   Plan: no changes anticipated at this time; she is eating well    (E11.319) Diabetic retinopathy of both eyes (H) 1- stable per exam  Comment: A1C 6.5  Plan: no changes in med    (S88.911S,  S88.912S) Amputation of both lower extremities, sequela (H)  Comment: 2010 and 2013; able to place prsothetics with some assistance.  Plan: bilateral AKA, prosthetics    (M54.42,  G89.29) Chronic left-sided low back pain with left-sided sciatica  Comment: improved with low dose ( Prednisone 2.5 mg) Prednisone  Plan: no changes in Prednisone;     Elevated BLOOD PRESSURE in the setting of heart disease and ASCVD and CKD  She is on ARB, CCB, BB and Apresoline      Adelaide Lujan MD  Internal Medicine  electronically signed

## 2017-02-22 ENCOUNTER — HOSPITAL LABORATORY (OUTPATIENT)
Dept: OTHER | Facility: CLINIC | Age: 82
End: 2017-02-22

## 2017-02-22 ENCOUNTER — NURSING HOME VISIT (OUTPATIENT)
Dept: GERIATRICS | Facility: CLINIC | Age: 82
End: 2017-02-22
Payer: MEDICARE

## 2017-02-22 DIAGNOSIS — J10.1 INFLUENZA DUE TO INFLUENZA VIRUS, TYPE A, HUMAN: ICD-10-CM

## 2017-02-22 DIAGNOSIS — R05.9 COUGH: Primary | ICD-10-CM

## 2017-02-22 DIAGNOSIS — N18.30 SECONDARY DM WITH CKD STAGE 3 AND HYPERTENSION (H): ICD-10-CM

## 2017-02-22 DIAGNOSIS — E13.22 SECONDARY DM WITH CKD STAGE 3 AND HYPERTENSION (H): ICD-10-CM

## 2017-02-22 DIAGNOSIS — M79.10 MUSCLE PAIN: ICD-10-CM

## 2017-02-22 DIAGNOSIS — I12.9 SECONDARY DM WITH CKD STAGE 3 AND HYPERTENSION (H): ICD-10-CM

## 2017-02-22 DIAGNOSIS — R09.81 NASAL CONGESTION: ICD-10-CM

## 2017-02-22 LAB
BASOPHILS # BLD AUTO: 0 10E9/L (ref 0–0.2)
BASOPHILS NFR BLD AUTO: 0.2 %
DIFFERENTIAL METHOD BLD: ABNORMAL
EOSINOPHIL # BLD AUTO: 0.1 10E9/L (ref 0–0.7)
EOSINOPHIL NFR BLD AUTO: 2.3 %
ERYTHROCYTE [DISTWIDTH] IN BLOOD BY AUTOMATED COUNT: 14 % (ref 10–15)
FLUAV+FLUBV RNA SPEC QL NAA+PROBE: ABNORMAL
FLUAV+FLUBV RNA SPEC QL NAA+PROBE: ABNORMAL
HCT VFR BLD AUTO: 27.9 % (ref 35–47)
HGB BLD-MCNC: 9 G/DL (ref 11.7–15.7)
IMM GRANULOCYTES # BLD: 0 10E9/L (ref 0–0.4)
IMM GRANULOCYTES NFR BLD: 0.2 %
LYMPHOCYTES # BLD AUTO: 0.8 10E9/L (ref 0.8–5.3)
LYMPHOCYTES NFR BLD AUTO: 17 %
MCH RBC QN AUTO: 29.9 PG (ref 26.5–33)
MCHC RBC AUTO-ENTMCNC: 32.3 G/DL (ref 31.5–36.5)
MCV RBC AUTO: 93 FL (ref 78–100)
MONOCYTES # BLD AUTO: 0.6 10E9/L (ref 0–1.3)
MONOCYTES NFR BLD AUTO: 11.5 %
NEUTROPHILS # BLD AUTO: 3.4 10E9/L (ref 1.6–8.3)
NEUTROPHILS NFR BLD AUTO: 68.8 %
NRBC # BLD AUTO: 0 10*3/UL
NRBC BLD AUTO-RTO: 0 /100
PLATELET # BLD AUTO: 178 10E9/L (ref 150–450)
RBC # BLD AUTO: 3.01 10E12/L (ref 3.8–5.2)
RSV RNA SPEC NAA+PROBE: ABNORMAL
SPECIMEN SOURCE: ABNORMAL
WBC # BLD AUTO: 4.9 10E9/L (ref 4–11)

## 2017-02-22 PROCEDURE — 99308 SBSQ NF CARE LOW MDM 20: CPT | Performed by: NURSE PRACTITIONER

## 2017-02-24 NOTE — PROGRESS NOTES
[place] [facility]    Chief Complaint   Patient presents with     Cough       HPI:    Cinthia Whittington is a 89 year old  (11/5/1924), who is being seen today for an episodic care visit at [facility]. 2- concern is:  Cough  For a few days    Nasal congestion  For a few days    Muscle pain  Just not feeling well    Influenza due to influenza virus, type A, human  Results from nasal viral culture obtained on 2-    Secondary DM with CKD stage 3 and hypertension (H)  GFR 35 ml/min 12-       ALLERGIES: Lisinopril and Remeron [mirtazapine]  Past Medical, Surgical, Family and Social History reviewed and updated in EPIC.    Current Outpatient Prescriptions   Medication Sig Dispense Refill     Oseltamivir Phosphate (TAMIFLU PO) Take by mouth 2 times daily       GUAIFENESIN PO Take 20 mLs by mouth every 3 hours as needed       QUEtiapine Fumarate (SEROQUEL PO) Take 12.5 mg by mouth At Bedtime       fluticasone (FLONASE) 50 MCG/ACT spray Spray 1 spray into both nostrils 2 times daily       acetaminophen (TYLENOL) 500 MG tablet Take 500 mg by mouth 4 times daily       Multiple Vitamins-Minerals (MULTIVITAMIN PO) Take 1 Tablespoonful by mouth daily (with breakfast) With iron       PREDNISONE PO Take 2.5 mg by mouth every morning       diclofenac (VOLTAREN) 1 % GEL topical gel Apply 2 g topically 4 times daily Apply to L side of low back and into posterior L thigh       Glycerin, Laxative, (GLYCERIN, ADULT,) 80.7 % SUPP Place 1 suppository rectally daily as needed (constipation)       amLODIPine (NORVASC) 10 MG tablet Take 10 mg by mouth daily at 5PM       insulin aspart (INSULIN ASPART) 100 UNIT/ML soln Inject Subcutaneous 3 times daily (with meals) Sliding scale coverage>  to 250> 3 units; 251 to 300> 6 units; 301 to 350> 9 units; 351 to 400> 12 units; 401 + give 15 units       LOSARTAN POTASSIUM PO Take 100 mg by mouth daily Hold for SBP <115        acetaminophen (TYLENOL) 500 MG tablet Take 500 mg by mouth every 4 hours (while awake) And q 4 hrs prn> max g / 24 hrs       SIMVASTATIN PO Take 10 mg by mouth At Bedtime        senna-docusate (SENOKOT-S;PERICOLACE) 8.6-50 MG per tablet Take 1 tablet by mouth 2 times daily 100 tablet      cholecalciferol (VITAMIN D3) 1000 UNIT tablet Take 1,000 Units by mouth daily       insulin glargine (LANTUS) 100 UNIT/ML PEN Inject 30 Units Subcutaneous every morning Raise dose by one unit if AM glucoses are over 130.       bisacodyl (DULCOLAX) 5 MG EC tablet Take 5 mg by mouth 2 times daily as needed for constipation       clopidogrel (PLAVIX) 75 MG tablet Take 1 tablet (75 mg) by mouth daily 90 tablet 1     donepezil (ARICEPT) 10 MG tablet Take 1 tablet (10 mg) by mouth At Bedtime 90 tablet 1     carvedilol (COREG) 12.5 MG tablet Take 1 tablet (12.5 mg) by mouth 2 times daily (with meals) 180 tablet 1     hydrALAZINE (APRESOLINE) 50 MG tablet Take 1 tablet (50 mg) by mouth 3 times daily Take at 8AM, at 2PM, and at 9PM. 90 tablet 1     nitroglycerin (NITROSTAT) 0.4 MG SL tablet Place 1 tablet (0.4 mg) under the tongue every 5 minutes as needed for chest pain If still having symptoms after 3 doses (15 minutes) call 911. 25 tablet 0     polyethylene glycol 0.4%- propylene glycol 0.3% (SYSTANE) 0.4-0.3 % SOLN ophthalmic solution Place 1 drop into both eyes At Bedtime         REVIEW OF SYSTEMS:  4 point ROS including Respiratory, CV, GI and , other than that noted in the HPI,  is negative    Physical Exam:  There were no vitals taken for this visit.  GENERAL APPEARANCE:  Alert, in mod distress due to with cough and nasal congestion  ENT:  Mouth and posterior oropharynx normal, moist mucous membranes, [place]  EYES:  EOM, conjunctivae, lids, pupils and irises normal  RESP:  respiratory effort and palpation of chest normal, lungs clear to auscultation , no respiratory distress, cough >non productive  CV:  Palpation and auscultation of  heart done , regular rate and rhythm, no murmur, rub, or gallop, no edema  M/S:   Gait and station abnormal > Depends on staff for transfers, is transported by staff with a w/c  SKIN:  Soft and intact, temp slightly increased  NEURO:   Cranial nerves 2-12 are normal tested and grossly at patient's baseline  PSYCH:  insight and judgement impaired, memory impaired , she did not wish to be examined and also did not wish to have nasal viral cultures obtained, anxious    Recent Labs:    Lab Results   Component Value Date    WBC 4.9 02/22/2017     Lab Results   Component Value Date    RBC 3.01 02/22/2017     Lab Results   Component Value Date    HGB 9.0 02/22/2017     Lab Results   Component Value Date    HCT 27.9 02/22/2017     Lab Results   Component Value Date    MCV 93 02/22/2017     Lab Results   Component Value Date    MCH 29.9 02/22/2017     Lab Results   Component Value Date    MCHC 32.3 02/22/2017     Lab Results   Component Value Date    RDW 14.0 02/22/2017     Lab Results   Component Value Date     02/22/2017     Chest xray> 2- > did not show acute pulmonary changes    Assessment/Plan:  Cough  I did order a CBC, chest xray and nasal viral cultures  I encouraged her to cont using the scheduled cough syrup  CBC was WNL    Nasal congestion  I encouraged her to cont with the scheduled nasal spray    Muscle pain  Suspicious that she might have influenza  Nasal culture obtained    Influenza due to influenza virus, type A, human  Her test was + for influenza A  She was started on Tamiflu 30 mg po bid x 5 days by on call MD    Secondary DM with CKD stage 3 and hypertension (H)  Do renally adjust meds> this was done by on call for Tamiflu    Son was informed of all results  I also encouraged him to see his PCP and be checked for influenza    Orders:    See A&P    Time 40 min    Electronically signed by  AL Kim CNP

## 2017-03-22 ENCOUNTER — NURSING HOME VISIT (OUTPATIENT)
Dept: GERIATRICS | Facility: CLINIC | Age: 82
End: 2017-03-22
Payer: MEDICARE

## 2017-03-22 DIAGNOSIS — F22 PARANOIA (H): ICD-10-CM

## 2017-03-22 DIAGNOSIS — F03.91 DEMENTIA WITH BEHAVIORAL DISTURBANCE, UNSPECIFIED DEMENTIA TYPE: Primary | ICD-10-CM

## 2017-03-22 DIAGNOSIS — R46.89 AGGRESSION: ICD-10-CM

## 2017-03-22 PROCEDURE — 99307 SBSQ NF CARE SF MDM 10: CPT | Performed by: NURSE PRACTITIONER

## 2017-03-23 ENCOUNTER — HOSPITAL LABORATORY (OUTPATIENT)
Dept: OTHER | Facility: CLINIC | Age: 82
End: 2017-03-23

## 2017-03-23 LAB
ALBUMIN UR-MCNC: 10 MG/DL
APPEARANCE UR: ABNORMAL
BACTERIA #/AREA URNS HPF: ABNORMAL /HPF
BILIRUB UR QL STRIP: NEGATIVE
COLOR UR AUTO: ABNORMAL
GLUCOSE UR STRIP-MCNC: NEGATIVE MG/DL
HGB UR QL STRIP: NEGATIVE
KETONES UR STRIP-MCNC: NEGATIVE MG/DL
LEUKOCYTE ESTERASE UR QL STRIP: ABNORMAL
NITRATE UR QL: NEGATIVE
PH UR STRIP: 5.5 PH (ref 5–7)
RBC #/AREA URNS AUTO: 1 /HPF (ref 0–2)
SP GR UR STRIP: 1.01 (ref 1–1.03)
SQUAMOUS #/AREA URNS AUTO: 1 /HPF (ref 0–1)
URN SPEC COLLECT METH UR: ABNORMAL
UROBILINOGEN UR STRIP-MCNC: NORMAL MG/DL (ref 0–2)
WBC #/AREA URNS AUTO: 5 /HPF (ref 0–2)

## 2017-03-24 NOTE — PROGRESS NOTES
[place] [facility]    Chief Complaint   Patient presents with     Dementia       HPI:    Cinthia Whittington is a 89 year old  (11/5/1924), who is being seen today for an episodic care visit at [facility].   Today's concern is:the evening nurse manager asked me to review meds, because Cinthia has become more aggressive and confused.    (F03.91) Dementia with behavioral disturbance, unspecified dementia type  (primary encounter diagnosis)  Comment: she does what she wants    (R45.89) Aggression  Comment: she becomes agitated and verbally aggressive when she is advised that she cannot do something    (F22) Paranoia (H)  Comment: she is mistrusting and becomes accusing      REVIEW OF SYSTEMS:  4 point ROS including Respiratory, CV, GI and , other than that noted in the HPI,  is negative  You cannot tell me what to do    GENERAL APPEARANCE:  Alert, in no distress  She was angry when approached and told me to shut up  She told me that she can go where ever she wishes and does not have to ask for permission    Her son has taken her over to TCU and she has now been going there on her own    Assessment/Plan  Dementia with behavioral disturbance, unspecified dementia type  I let her vent her frustration    Aggression  I backed off  I ended up talking with the son  I advised him that his mother does have dementia and cannot problem solve  I asked him not to take her over to TCU  I also advised him that I am concerned that she may choose to go outside and tell anyone  This would make her at great risk because she cannot problem solve and may get lost    I advised him that there is not medication to treat this behavior    I did obtain a UA t rule out a UTI> it was negative    Paranoia (H)  Will cont with low dose Seroquel        Time ~ 25 min spend in conversation with son and also RN manager    [place], APRN CNP

## 2017-04-12 ENCOUNTER — NURSING HOME VISIT (OUTPATIENT)
Dept: GERIATRICS | Facility: CLINIC | Age: 82
End: 2017-04-12
Payer: MEDICARE

## 2017-04-12 VITALS
WEIGHT: 130.2 LBS | TEMPERATURE: 97.1 F | OXYGEN SATURATION: 95 % | SYSTOLIC BLOOD PRESSURE: 136 MMHG | BODY MASS INDEX: 23.96 KG/M2 | HEART RATE: 60 BPM | HEIGHT: 62 IN | DIASTOLIC BLOOD PRESSURE: 59 MMHG | RESPIRATION RATE: 16 BRPM

## 2017-04-12 DIAGNOSIS — M54.42 CHRONIC LEFT-SIDED LOW BACK PAIN WITH LEFT-SIDED SCIATICA: ICD-10-CM

## 2017-04-12 DIAGNOSIS — N18.30 SECONDARY DM WITH CKD STAGE 3 AND HYPERTENSION (H): ICD-10-CM

## 2017-04-12 DIAGNOSIS — N18.30 TYPE 2 DIABETES MELLITUS WITH STAGE 3 CHRONIC KIDNEY DISEASE, WITH LONG-TERM CURRENT USE OF INSULIN (H): ICD-10-CM

## 2017-04-12 DIAGNOSIS — Z79.4 TYPE 2 DIABETES MELLITUS WITH STAGE 3 CHRONIC KIDNEY DISEASE, WITH LONG-TERM CURRENT USE OF INSULIN (H): ICD-10-CM

## 2017-04-12 DIAGNOSIS — G89.29 CHRONIC LEFT-SIDED LOW BACK PAIN WITH LEFT-SIDED SCIATICA: ICD-10-CM

## 2017-04-12 DIAGNOSIS — I12.9 SECONDARY DM WITH CKD STAGE 3 AND HYPERTENSION (H): ICD-10-CM

## 2017-04-12 DIAGNOSIS — E11.22 TYPE 2 DIABETES MELLITUS WITH STAGE 3 CHRONIC KIDNEY DISEASE, WITH LONG-TERM CURRENT USE OF INSULIN (H): ICD-10-CM

## 2017-04-12 DIAGNOSIS — G47.00 PERSISTENT INSOMNIA: ICD-10-CM

## 2017-04-12 DIAGNOSIS — F22 PARANOIA (H): Primary | ICD-10-CM

## 2017-04-12 DIAGNOSIS — E13.22 SECONDARY DM WITH CKD STAGE 3 AND HYPERTENSION (H): ICD-10-CM

## 2017-04-12 PROCEDURE — 99309 SBSQ NF CARE MODERATE MDM 30: CPT | Performed by: NURSE PRACTITIONER

## 2017-04-12 NOTE — PROGRESS NOTES
[place] [facility]    Chief Complaint   Patient presents with     jail Regulatory       HPI:    Cinthia Whittington is a 89 year old  (11/5/1924), who is being seen today for a federally mandated E/M visit at [facility]. Today's concerns are:  Paranoia (H)  She believes that people just do not give good care to her  Mood vacillates    Persistent insomnia  She stated that with the current meds she is doing well    Chronic left-sided low back pain with left-sided sciatica  She denied all back pain today    Type 2 diabetes mellitus with stage 3 chronic kidney disease, with long-term current use of insulin (H)  BGTs 80 to 200 with occ high ~ 300    Secondary DM with CKD stage 3 and hypertension (H)  GFR 35, creat 1.39      ALLERGIES: Lisinopril and Remeron [mirtazapine]  PAST MEDICAL HISTORY:  has a past medical history of Anemia of chronic disease; ASCVD (arteriosclerotic cardiovascular disease) (10/15/2010); Carotid artery disease (H); Dementia; Diabetes mellitus (H); Diabetic macular edema (H); Diabetic neuropathy (H); GERD (gastroesophageal reflux disease); Hearing loss; HTN; Hypercholesteremia; Lymphedema; Multiple thyroid nodules; Posterior vitreous detachment of both eyes; PVD (peripheral vascular disease) (H) (2009); and Type 2 diabetes mellitus, uncontrolled (H).  PAST SURGICAL HISTORY:  has a past surgical history that includes SPINE FUSION,ANTER,3 SGMTS; hysterectomy, pap no longer indicated; SLING OPERATION FOR STRESS INCONTINENCE; VEIN IN SITU BYPASS GRAFT,FEM-POP (4/9/10); Amputate leg below knee (1/30/2013); cataract iol, rt/lt (2-29-00,3-27-00); Heart Cath, Angioplasty (8/2010); Amputate leg below knee (2010); Eye surgery; R TKA; and history of low back surgery.  FAMILY HISTORY: family history includes DIABETES in her mother; Family History Negative in her brother, brother, brother, daughter, and son; Respiratory in her father.  SOCIAL HISTORY:  reports  that she quit smoking about 37 years ago. She has never used smokeless tobacco. She reports that she drinks about 1.8 oz of alcohol per week  She reports that she does not use illicit drugs.    MEDICATIONS:  Current Outpatient Prescriptions   Medication Sig Dispense Refill     GUAIFENESIN PO Take 20 mLs by mouth every 4 hours as needed        QUEtiapine Fumarate (SEROQUEL PO) Take 12.5 mg by mouth At Bedtime       fluticasone (FLONASE) 50 MCG/ACT spray Spray 1 spray into both nostrils 2 times daily as needed        acetaminophen (TYLENOL) 500 MG tablet Take 500 mg by mouth 4 times daily       Multiple Vitamins-Minerals (MULTIVITAMIN PO) Take 1 Tablespoonful by mouth daily (with breakfast) With iron       PREDNISONE PO Take 2.5 mg by mouth every morning       diclofenac (VOLTAREN) 1 % GEL topical gel Apply 2 g topically 4 times daily Apply to L side of low back and into posterior L thigh       Glycerin, Laxative, (GLYCERIN, ADULT,) 80.7 % SUPP Place 1 suppository rectally daily as needed (constipation)       amLODIPine (NORVASC) 10 MG tablet Take 10 mg by mouth daily at 5PM       insulin aspart (INSULIN ASPART) 100 UNIT/ML soln Inject Subcutaneous 3 times daily (with meals) Sliding scale coverage>  to 250> 3 units; 251 to 300> 6 units; 301 to 350> 9 units; 351 to 400> 12 units; 401 + give 15 units       LOSARTAN POTASSIUM PO Take 100 mg by mouth daily Hold for SBP <115       acetaminophen (TYLENOL) 500 MG tablet Take 500 mg by mouth every 4 hours (while awake) And q 4 hrs prn> max g / 24 hrs       SIMVASTATIN PO Take 10 mg by mouth At Bedtime        senna-docusate (SENOKOT-S;PERICOLACE) 8.6-50 MG per tablet Take 1 tablet by mouth 2 times daily 100 tablet      cholecalciferol (VITAMIN D3) 1000 UNIT tablet Take 1,000 Units by mouth daily       insulin glargine (LANTUS) 100 UNIT/ML PEN Inject 30 Units Subcutaneous every morning Raise dose by one unit if AM glucoses are over 130.       bisacodyl (DULCOLAX) 5 MG EC  "tablet Take 5 mg by mouth 2 times daily as needed for constipation       clopidogrel (PLAVIX) 75 MG tablet Take 1 tablet (75 mg) by mouth daily 90 tablet 1     donepezil (ARICEPT) 10 MG tablet Take 1 tablet (10 mg) by mouth At Bedtime 90 tablet 1     carvedilol (COREG) 12.5 MG tablet Take 1 tablet (12.5 mg) by mouth 2 times daily (with meals) 180 tablet 1     hydrALAZINE (APRESOLINE) 50 MG tablet Take 1 tablet (50 mg) by mouth 3 times daily Take at 8AM, at 2PM, and at 9PM. 90 tablet 1     nitroglycerin (NITROSTAT) 0.4 MG SL tablet Place 1 tablet (0.4 mg) under the tongue every 5 minutes as needed for chest pain If still having symptoms after 3 doses (15 minutes) call 911. 25 tablet 0     polyethylene glycol 0.4%- propylene glycol 0.3% (SYSTANE) 0.4-0.3 % SOLN ophthalmic solution Place 1 drop into both eyes 2 times daily as needed        Medications reviewed:  Medications reconciled to facility chart and changes were made to reflect current medications as identified as above med list. Below are the changes that were made:   Medications stopped since last EPIC medication reconciliation:   There are no discontinued medications.    Medications started since last [facility] medication reconciliation:  No orders of the defined types were placed in this encounter.    Case Management:  I have reviewed the care plan and MDS and do agree with the plan. Patient's desire to return to the community is not assessible due to cognitive impairment.  Information reviewed:  Medications, vital signs, orders, and nursing notes.    ROS:  4 point ROS including Respiratory, CV, GI and , other than that noted in the HPI,  is negative    Exam:  /59  Pulse 60  Temp 97.1  F (36.2  C)  Resp 16  Ht 5' 1.5\" (1.562 m)  Wt 130 lb 3.2 oz (59.1 kg)  SpO2 95%  BMI 24.2 kg/m2  GENERAL APPEARANCE:  Alert, her mood vacillated during the visit from being nice to accusing me of  Not giving enough care to her  ENT:  Mouth and posterior oropharynx " normal, moist mucous membranes, [place], does well in 1:1  EYES:  EOM, conjunctivae, lids, pupils and irises normal, has impaired vision  RESP:  respiratory effort and palpation of chest normal, lungs clear to auscultation , no respiratory distress  CV:  Palpation and auscultation of heart done , regular rate and rhythm, soft murmur, rub, or gallop, no edema  ABDOMEN:  normal bowel sounds, soft, nontender, no hepatosplenomegaly or other masses  M/S:   Gait and station abnormal > she wears bilateral BKA and transfers from the w/c to the toilet with assist of one  SKIN:  Soft and intact  NEURO:   Cranial nerves 2-12 are normal tested and grossly at patient's baseline  PSYCH:  oriented to herself, insight and judgement impaired, memory impaired , she becomes easily agitated and accusatory    Lab/Diagnostic data:      Last Basic Metabolic Panel:  Lab Results   Component Value Date     12/12/2016      Lab Results   Component Value Date    POTASSIUM 4.2 12/12/2016     Lab Results   Component Value Date    CHLORIDE 110 12/12/2016     Lab Results   Component Value Date    VIDHI 9.0 12/12/2016     Lab Results   Component Value Date    CO2 26 12/12/2016     Lab Results   Component Value Date    BUN 32 12/12/2016     Lab Results   Component Value Date    CR 1.39 12/12/2016     Lab Results   Component Value Date    GLC 76 12/12/2016     Lab Results   Component Value Date    WBC 4.9 02/22/2017     Lab Results   Component Value Date    RBC 3.01 02/22/2017     Lab Results   Component Value Date    HGB 9.0 02/22/2017     Lab Results   Component Value Date    HCT 27.9 02/22/2017     Lab Results   Component Value Date    MCV 93 02/22/2017     Lab Results   Component Value Date    MCH 29.9 02/22/2017     Lab Results   Component Value Date    MCHC 32.3 02/22/2017     Lab Results   Component Value Date    RDW 14.0 02/22/2017     Lab Results   Component Value Date     02/22/2017     Lab Results   Component Value Date    A1C 6.5  01/30/2017    A1C 7.6 09/14/2016    A1C 8.2 06/22/2016    A1C 8.3 12/08/2014    A1C 7.8 06/10/2014         ASSESSMENT/PLAN  Paranoia (H)  Overall she is much improved with low dose Seroquel  Decrease may cause emotional harm  No change in tx    Persistent insomnia  Cont with current tx    Chronic left-sided low back pain with left-sided sciatica  Will cont with current tx    Type 2 diabetes mellitus with stage 3 chronic kidney disease, with long-term current use of insulin (H)  Will cont with current tx    Secondary DM with CKD stage 3 and hypertension (H)  Will obtain BMP end of April       Orders:  See A&P    Total time spent with patient visit was 35 min including patient visit and review of past records.Greater than 50% of total time spent with counseling and coordinating care.    Electronically signed by:  AL Kim CNP

## 2017-04-19 ENCOUNTER — HOSPITAL LABORATORY (OUTPATIENT)
Dept: OTHER | Facility: CLINIC | Age: 82
End: 2017-04-19

## 2017-04-19 LAB
ANION GAP SERPL CALCULATED.3IONS-SCNC: 5 MMOL/L (ref 3–14)
BUN SERPL-MCNC: 35 MG/DL (ref 7–30)
CALCIUM SERPL-MCNC: 8.4 MG/DL (ref 8.5–10.1)
CHLORIDE SERPL-SCNC: 109 MMOL/L (ref 94–109)
CO2 SERPL-SCNC: 28 MMOL/L (ref 20–32)
CREAT SERPL-MCNC: 1.66 MG/DL (ref 0.52–1.04)
ERYTHROCYTE [DISTWIDTH] IN BLOOD BY AUTOMATED COUNT: 14.9 % (ref 10–15)
GFR SERPL CREATININE-BSD FRML MDRD: 29 ML/MIN/1.7M2
GLUCOSE SERPL-MCNC: 73 MG/DL (ref 70–99)
HCT VFR BLD AUTO: 26 % (ref 35–47)
HGB BLD-MCNC: 8.3 G/DL (ref 11.7–15.7)
MCH RBC QN AUTO: 30.4 PG (ref 26.5–33)
MCHC RBC AUTO-ENTMCNC: 31.9 G/DL (ref 31.5–36.5)
MCV RBC AUTO: 95 FL (ref 78–100)
PLATELET # BLD AUTO: 144 10E9/L (ref 150–450)
POTASSIUM SERPL-SCNC: 4.6 MMOL/L (ref 3.4–5.3)
RBC # BLD AUTO: 2.73 10E12/L (ref 3.8–5.2)
SODIUM SERPL-SCNC: 142 MMOL/L (ref 133–144)
WBC # BLD AUTO: 3.6 10E9/L (ref 4–11)

## 2017-05-03 ENCOUNTER — ALLIED HEALTH/NURSE VISIT (OUTPATIENT)
Dept: CARDIOLOGY | Facility: CLINIC | Age: 82
End: 2017-05-03
Payer: MEDICARE

## 2017-05-03 DIAGNOSIS — Z95.0 CARDIAC PACEMAKER IN SITU: Primary | ICD-10-CM

## 2017-05-03 PROCEDURE — 93293 PM PHONE R-STRIP DEVICE EVAL: CPT | Performed by: INTERNAL MEDICINE

## 2017-05-03 NOTE — PROGRESS NOTES
~90 day phone teletrace  Intrinsic Rhythm at time of check. Magnet response WNL.  F/U scheduled q 3 months. INNA MccormickT

## 2017-05-03 NOTE — MR AVS SNAPSHOT
"              After Visit Summary   5/3/2017    Cinthia Whittington    MRN: [id number]           Patient Information     Date Of Birth          11/5/1924        Visit Information        Provider Department      5/3/2017 2:00 PM GUILLERMO SANTANA [facility]        Today's Diagnoses     Cardiac pacemaker in situ    -  1       Follow-ups after your visit        Your next 10 appointments already scheduled     May 22, 2017  2:00 PM CDT   Complete Exam with Buster Gamez MD   [facility] ([facility])    [facility] [address] [facility]  [address]  [facility] [address]   [phone]            Aug 09, 2017  2:00 PM CDT   Phone Device Check with LI TECH1   [facility] ([facility])    [facility] [facility] [address]  [facility] [id number] [phone]              Who to contact     If you have questions or need follow up information about today's clinic visit or your schedule please contact [facility] directly at [phone].  Normal or non-critical lab and imaging results will be communicated to you by Dealoh[employer], letter or phone within 4 business days after the clinic has received the results. If you do not hear from us within 7 days, please contact the clinic through Dealohart or phone. If you have a critical or abnormal lab result, we will notify you by phone as soon as possible.  Submit refill requests through [employer] or call your pharmacy and they will forward the refill request to us. Please allow 3 business days for your refill to be completed.          Additional Information About Your Visit        Dealohart Information     [employer] lets you send messages to your doctor, view your test results, renew your prescriptions, schedule appointments and more. To sign up, go to [email or web address] . Click on \"Log in\" on the left " "side of the screen, which will take you to the Welcome page. Then click on \"Sign up Now\" on the right side of the page.     You will be asked to enter the access code listed below, as well as some personal information. Please follow the directions to create your username and password.     Your access code is: [email or web address]  Expires: 2017  2:11 PM     Your access code will  in 90 days. If you need help or a new code, please call your [facility] clinic or [phone].        Care EveryWhere ID     This is your Care EveryWhere ID. This could be used by other organizations to access your [facility] medical records  [id number]         Blood Pressure from Last 3 Encounters:   17 136/59   17 152/59   17 140/70    Weight from Last 3 Encounters:   17 59.1 kg (130 lb 3.2 oz)   17 60.8 kg (134 lb)   17 60.8 kg (134 lb)              We Performed the Following     PM PHONE R STRIP EVAL UP TO 90 DAYS ([phone])        Primary Care Provider Office Phone # Fax #    Adelaide Lujan [phone]       [facility] [address]  [facility] [address]        Thank you!     Thank you for choosing [facility] [place]  for your care. Our goal is always to provide you with excellent care. Hearing back from our patients is one way we can continue to improve our services. Please take a few minutes to complete the written survey that you may receive in the mail after your visit with us. Thank you!             Your Updated Medication List - Protect others around you: Learn how to safely use, store and throw away your medicines at www.disposemymeds.org.          This list is accurate as of: 5/3/17  2:11 PM.  Always use your most recent med list.                   Brand Name Dispense Instructions for use    amLODIPine 10 MG tablet    NORVASC     Take 10 mg by mouth daily at 5PM       bisacodyl 5 MG EC tablet    DULCOLAX     Take 5 mg by " mouth 2 times daily as needed for constipation       carvedilol 12.5 MG tablet    COREG    180 tablet    Take 1 tablet (12.5 mg) by mouth 2 times daily (with meals)       cholecalciferol 1000 UNIT tablet    vitamin D     Take 1,000 Units by mouth daily       clopidogrel 75 MG tablet    PLAVIX    90 tablet    Take 1 tablet (75 mg) by mouth daily       diclofenac 1 % Gel topical gel    VOLTAREN     Apply 2 g topically 4 times daily Apply to L side of low back and into posterior L thigh       donepezil 10 MG tablet    ARICEPT    90 tablet    Take 1 tablet (10 mg) by mouth At Bedtime       fluticasone 50 MCG/ACT spray    FLONASE     Spray 1 spray into both nostrils 2 times daily as needed       GLYCERIN (ADULT) 80.7 % Supp   Generic drug:  Glycerin (Laxative)      Place 1 suppository rectally daily as needed (constipation)       GUAIFENESIN PO      Take 20 mLs by mouth every 4 hours as needed       hydrALAZINE 50 MG tablet    APRESOLINE    90 tablet    Take 1 tablet (50 mg) by mouth 3 times daily Take at 8AM, at 2PM, and at 9PM.       insulin aspart 100 UNIT/ML injection    NovoLOG PEN     Inject Subcutaneous 3 times daily (with meals) Sliding scale coverage>  to 250> 3 units; 251 to 300> 6 units; 301 to 350> 9 units; 351 to 400> 12 units; 401 + give 15 units       insulin glargine 100 UNIT/ML injection    LANTUS     Inject 30 Units Subcutaneous every morning Raise dose by one unit if AM glucoses are over 130.       LOSARTAN POTASSIUM PO      Take 100 mg by mouth daily Hold for SBP <115       MULTIVITAMIN PO      Take 1 Tablespoonful by mouth daily (with breakfast) With iron       nitroglycerin 0.4 MG sublingual tablet    NITROSTAT    25 tablet    Place 1 tablet (0.4 mg) under the tongue every 5 minutes as needed for chest pain If still having symptoms after 3 doses (15 minutes) call 911.       PREDNISONE PO      Take 2.5 mg by mouth every morning       senna-docusate 8.6-50 MG per tablet    SENOKOT-S;PERICOLACE     100 tablet    Take 1 tablet by mouth 2 times daily       SEROQUEL PO      Take 12.5 mg by mouth At Bedtime       SIMVASTATIN PO      Take 10 mg by mouth At Bedtime       SYSTANE 0.4-0.3 % Soln ophthalmic solution   Generic drug:  polyethylene glycol 0.4%- propylene glycol 0.3%      Place 1 drop into both eyes 2 times daily as needed       * TYLENOL 500 MG tablet   Generic drug:  acetaminophen      Take 500 mg by mouth every 4 hours (while awake) And q 4 hrs prn> max g / 24 hrs       * acetaminophen 500 MG tablet    TYLENOL     Take 500 mg by mouth 4 times daily       * Notice:  This list has 2 medication(s) that are the same as other medications prescribed for you. Read the directions carefully, and ask your doctor or other care provider to review them with you.

## 2017-05-10 ENCOUNTER — HOSPITAL LABORATORY (OUTPATIENT)
Dept: OTHER | Facility: CLINIC | Age: 82
End: 2017-05-10

## 2017-05-10 LAB
ALBUMIN SERPL-MCNC: 3.3 G/DL (ref 3.4–5)
ALP SERPL-CCNC: 41 U/L (ref 40–150)
ALT SERPL W P-5'-P-CCNC: 18 U/L (ref 0–50)
AST SERPL W P-5'-P-CCNC: 17 U/L (ref 0–45)
BILIRUB DIRECT SERPL-MCNC: <0.1 MG/DL (ref 0–0.2)
BILIRUB SERPL-MCNC: 0.3 MG/DL (ref 0.2–1.3)
CHOLEST SERPL-MCNC: 154 MG/DL
HDLC SERPL-MCNC: 66 MG/DL
LDLC SERPL CALC-MCNC: 73 MG/DL
NONHDLC SERPL-MCNC: 88 MG/DL
PROT SERPL-MCNC: 6.7 G/DL (ref 6.8–8.8)
TRIGL SERPL-MCNC: 77 MG/DL

## 2017-05-23 VITALS
DIASTOLIC BLOOD PRESSURE: 60 MMHG | HEART RATE: 64 BPM | SYSTOLIC BLOOD PRESSURE: 154 MMHG | RESPIRATION RATE: 18 BRPM | WEIGHT: 128 LBS | OXYGEN SATURATION: 96 % | TEMPERATURE: 97.5 F | HEIGHT: 62 IN | BODY MASS INDEX: 23.55 KG/M2

## 2017-05-23 NOTE — PROGRESS NOTES
[place] [facility]    Chief Complaint   Patient presents with     RECHECK       HPI:    Cinthia Whittington is a 89 year old  (11/5/1924), who is being seen today for an episodic care visit at [facility].  HPI information obtained from: facility chart records.  Today's concern is:F/u on current chronic health issues    Type 2 diabetes mellitus with peripheral vascular disease (H) diabetes since 1980  Her son thinks her diabetes is much better controlled now  Her BGTs are 80 to 260 with an average ~ 200  Lab Results   Component Value Date    A1C 6.5 01/30/2017    A1C 7.6 09/14/2016    A1C 8.2 06/22/2016    A1C 8.3 12/08/2014    A1C 7.8 06/10/2014         Type 2 diabetes mellitus with stage 3 chronic kidney disease, unspecified long term insulin use status (H)  Her son thinks her diabetes is much better controlled now  Her BGTs are 80 to 260 with an average ~ 200    Carotid artery disease, unspecified laterality (H)  She continues to be on Plavix    Paranoia (H)  She tends to complain about staff and she also mistrusts them    History of amputation below knee, unspecified laterality (H)  She is able to put on her prosthesis on both LE and uses them for transfers    Lopez  She becomes angry quickly and is short tempered      ALLERGIES: Lisinopril and Remeron [mirtazapine]  Past Medical, Surgical, Family and Social History reviewed and updated in [employer].    Current Outpatient Prescriptions   Medication Sig Dispense Refill     GUAIFENESIN PO Take 20 mLs by mouth every 4 hours as needed        QUEtiapine Fumarate (SEROQUEL PO) Take 12.5 mg by mouth At Bedtime       fluticasone (FLONASE) 50 MCG/ACT spray Spray 1 spray into both nostrils 2 times daily as needed        acetaminophen (TYLENOL) 500 MG tablet Take 500 mg by mouth 4 times daily       Multiple Vitamins-Minerals (MULTIVITAMIN PO) Take 1 Tablespoonful by mouth daily (with breakfast) With iron       PREDNISONE PO Take 2.5 mg by  mouth every morning       diclofenac (VOLTAREN) 1 % GEL topical gel Apply 2 g topically 4 times daily Apply to L side of low back and into posterior L thigh       Glycerin, Laxative, (GLYCERIN, ADULT,) 80.7 % SUPP Place 1 suppository rectally daily as needed (constipation)       amLODIPine (NORVASC) 10 MG tablet Take 10 mg by mouth daily at 5PM       insulin aspart (INSULIN ASPART) 100 UNIT/ML soln Inject Subcutaneous 3 times daily (with meals) Sliding scale coverage>  to 250> 3 units; 251 to 300> 6 units; 301 to 350> 9 units; 351 to 400> 12 units; 401 + give 15 units       LOSARTAN POTASSIUM PO Take 100 mg by mouth daily Hold for SBP <115       acetaminophen (TYLENOL) 500 MG tablet Take 500 mg by mouth every 4 hours (while awake) And q 4 hrs prn> max g / 24 hrs       SIMVASTATIN PO Take 10 mg by mouth At Bedtime        senna-docusate (SENOKOT-S;PERICOLACE) 8.6-50 MG per tablet Take 1 tablet by mouth 2 times daily 100 tablet      cholecalciferol (VITAMIN D3) 1000 UNIT tablet Take 1,000 Units by mouth daily       insulin glargine (LANTUS) 100 UNIT/ML PEN Inject 30 Units Subcutaneous every morning Raise dose by one unit if AM glucoses are over 130.       clopidogrel (PLAVIX) 75 MG tablet Take 1 tablet (75 mg) by mouth daily 90 tablet 1     donepezil (ARICEPT) 10 MG tablet Take 1 tablet (10 mg) by mouth At Bedtime 90 tablet 1     carvedilol (COREG) 12.5 MG tablet Take 1 tablet (12.5 mg) by mouth 2 times daily (with meals) 180 tablet 1     hydrALAZINE (APRESOLINE) 50 MG tablet Take 1 tablet (50 mg) by mouth 3 times daily Take at 8AM, at 2PM, and at 9PM. 90 tablet 1     nitroglycerin (NITROSTAT) 0.4 MG SL tablet Place 1 tablet (0.4 mg) under the tongue every 5 minutes as needed for chest pain If still having symptoms after 3 doses (15 minutes) call 911. 25 tablet 0     polyethylene glycol 0.4%- propylene glycol 0.3% (SYSTANE) 0.4-0.3 % SOLN ophthalmic solution Place 1 drop into both eyes 2 times daily as needed     "    Medications reviewed:  Medications reconciled to facility chart and changes were made to reflect current medications as identified as above med list. Below are the changes that were made:   Medications stopped since last EPIC medication reconciliation:   There are no discontinued medications.    Medications started since last [facility] medication reconciliation:  No orders of the defined types were placed in this encounter.    REVIEW OF SYSTEMS:  4 point ROS including Respiratory, CV, GI and , other than that noted in the HPI,  is negative    Physical Exam:  /60  Pulse 64  Temp 97.5  F (36.4  C)  Resp 18  Ht 5' 1.5\" (1.562 m)  Wt 128 lb (58.1 kg)  SpO2 96%  BMI 23.79 kg/m2  GENERAL APPEARANCE:  Alert, in no distress, cooperative  ENT:  Mouth and posterior oropharynx normal, moist mucous membranes, [place], does well in 1:1  EYES:  EOM, conjunctivae, lids, pupils and irises normal, EOM normal,  CV:  Palpation and auscultation of heart done , regular rate and rhythm, no murmur, rub, or gallop, no edema  ABDOMEN:  normal bowel sounds, soft, nontender, no hepatosplenomegaly or other masses  M/S:   Gait and station abnormal > she wears mason prosthesis and transfers with min assist  She self propels her w/c  SKIN:  Soft and intact  NEURO:   Cranial nerves 2-12 are normal tested and grossly at patient's baseline  PSYCH:  oriented to herself and her surroundings, insight and judgement impaired, memory impaired , affect and mood normal, she is pleasant when her son is around    Recent Labs:   CBC RESULTS:   Recent Labs   Lab Test  04/19/17   0640  02/22/17   0805   WBC  3.6*  4.9   RBC  2.73*  3.01*   HGB  8.3*  9.0*   HCT  26.0*  27.9*   MCV  95  93   MCH  30.4  29.9   MCHC  31.9  32.3   RDW  14.9  14.0   PLT  144*  178       Last Basic Metabolic Panel:  Recent Labs   Lab Test  04/19/17   0640  12/12/16   0535   NA  142  143   POTASSIUM  4.6  4.2   CHLORIDE  109  110*   VIDHI  8.4*  9.0   CO2  28  26   BUN  35*  32* "   CR  1.66*  1.39*   GLC  73  76       Liver Function Studies -   Recent Labs   Lab Test  05/10/17   0610  06/22/16   1249   PROTTOTAL  6.7*  6.7*   ALBUMIN  3.3*  3.5   BILITOTAL  0.3  0.4   ALKPHOS  41  50   AST  17  21   ALT  18  26       TSH   Date Value Ref Range Status   10/05/2011 1.35 0.4 - 5.0 mU/L Final   10/15/2010 1.30 0.4 - 5.0 mU/L Final     Lab Results   Component Value Date    A1C 6.5 01/30/2017    A1C 7.6 09/14/2016       Assessment/Plan:  Type 2 diabetes mellitus with peripheral vascular disease (H) diabetes since 1980  Will cont with all the current meds  No krishnan in tx    Type 2 diabetes mellitus with stage 3 chronic kidney disease, unspecified long term insulin use status (H)  Will cont with all the current meds  No krishnan in tx    Carotid artery disease, unspecified laterality (H)  Will cont with current meds and tx    Paranoia (H)  Decrease of Seroquel may cause emotional harm  No change in tx    History of amputation below knee, unspecified laterality (H)  No change in tx    Lopez  No new meds      Orders:  none    Time> 30  Min> son was present during my visit    Electronically signed by  AL Kim CNP

## 2017-05-26 ENCOUNTER — NURSING HOME VISIT (OUTPATIENT)
Dept: GERIATRICS | Facility: CLINIC | Age: 82
End: 2017-05-26
Payer: MEDICARE

## 2017-05-26 DIAGNOSIS — E11.22 TYPE 2 DIABETES MELLITUS WITH STAGE 3 CHRONIC KIDNEY DISEASE, UNSPECIFIED LONG TERM INSULIN USE STATUS: ICD-10-CM

## 2017-05-26 DIAGNOSIS — N18.3 TYPE 2 DIABETES MELLITUS WITH STAGE 3 CHRONIC KIDNEY DISEASE, UNSPECIFIED LONG TERM INSULIN USE STATUS: ICD-10-CM

## 2017-05-26 DIAGNOSIS — F22 PARANOIA (H): ICD-10-CM

## 2017-05-26 DIAGNOSIS — E11.51 TYPE 2 DIABETES MELLITUS WITH PERIPHERAL VASCULAR DISEASE (H): Primary | ICD-10-CM

## 2017-05-26 DIAGNOSIS — Z89.519: ICD-10-CM

## 2017-05-26 DIAGNOSIS — I77.9 CAROTID ARTERY DISEASE, UNSPECIFIED LATERALITY (H): ICD-10-CM

## 2017-05-26 DIAGNOSIS — R45.89 MOODY: ICD-10-CM

## 2017-05-26 PROCEDURE — 99309 SBSQ NF CARE MODERATE MDM 30: CPT | Performed by: NURSE PRACTITIONER

## 2017-05-31 VITALS
SYSTOLIC BLOOD PRESSURE: 149 MMHG | RESPIRATION RATE: 18 BRPM | WEIGHT: 130.4 LBS | HEART RATE: 61 BPM | TEMPERATURE: 97.2 F | HEIGHT: 62 IN | DIASTOLIC BLOOD PRESSURE: 58 MMHG | OXYGEN SATURATION: 97 % | BODY MASS INDEX: 24 KG/M2

## 2017-05-31 NOTE — PROGRESS NOTES
[place] [facility]    Chief Complaint   Patient presents with     [facility] Regulatory       HPI:    Cinthia Whittington is a 89 year old  (11/5/1924), who is being seen today for a federally mandated E/M visit at [facility].  HPI information obtained from: facility chart records, facility staff and [facility] chart review. Today's concerns are:  1. Type 2 diabetes mellitus with peripheral vascular disease (H) diabetes since 1980    2. Secondary DM with CKD stage 3 and hypertension (H)    3. Uncontrolled type 2 diabetes mellitus with other circulatory complication, with long-term current use of insulin (H)    4. Amputation of both lower extremities, subsequent encounter (H)    5. ASCVD (arteriosclerotic cardiovascular disease)        ALLERGIES: Lisinopril and Remeron [mirtazapine]  PAST MEDICAL HISTORY:  has a past medical history of Anemia of chronic disease; ASCVD (arteriosclerotic cardiovascular disease) (10/15/2010); Carotid artery disease (H); Dementia; Diabetes mellitus (H); Diabetic macular edema (H); Diabetic neuropathy (H); GERD (gastroesophageal reflux disease); Hearing loss; HTN; Hypercholesteremia; Lymphedema; Multiple thyroid nodules; Posterior vitreous detachment of both eyes; PVD (peripheral vascular disease) (H) (2009); and Type 2 diabetes mellitus, uncontrolled (H).  PAST SURGICAL HISTORY:  has a past surgical history that includes SPINE FUSION,ANTER,3 SGMTS; hysterectomy, pap no longer indicated; SLING OPERATION FOR STRESS INCONTINENCE; VEIN IN SITU BYPASS GRAFT,FEM-POP (4/9/10); Amputate leg below knee (1/30/2013); cataract iol, rt/lt (2-29-00,3-27-00); Heart Cath, Angioplasty (8/2010); Amputate leg below knee (2010); Eye surgery; R TKA; and history of low back surgery.  FAMILY HISTORY: family history includes DIABETES in her mother; Family History Negative in her brother, brother, brother, daughter, and son; Respiratory in her father.  SOCIAL  HISTORY:  reports that she quit smoking about 38 years ago. She has never used smokeless tobacco. She reports that she drinks about 1.8 oz of alcohol per week  She reports that she does not use illicit drugs.    MEDICATIONS:  Current Outpatient Prescriptions   Medication Sig Dispense Refill     GUAIFENESIN PO Take 20 mLs by mouth every 4 hours as needed        QUEtiapine Fumarate (SEROQUEL PO) Take 12.5 mg by mouth At Bedtime       fluticasone (FLONASE) 50 MCG/ACT spray Spray 1 spray into both nostrils 2 times daily as needed        acetaminophen (TYLENOL) 500 MG tablet Take 500 mg by mouth 4 times daily       Multiple Vitamins-Minerals (MULTIVITAMIN PO) Take 1 Tablespoonful by mouth daily (with breakfast) With iron       PREDNISONE PO Take 2.5 mg by mouth every morning       diclofenac (VOLTAREN) 1 % GEL topical gel Apply 2 g topically 4 times daily Apply to L side of low back and into posterior L thigh       Glycerin, Laxative, (GLYCERIN, ADULT,) 80.7 % SUPP Place 1 suppository rectally daily as needed (constipation)       amLODIPine (NORVASC) 10 MG tablet Take 10 mg by mouth daily at 5PM       insulin aspart (INSULIN ASPART) 100 UNIT/ML soln Inject Subcutaneous 3 times daily (with meals) Sliding scale coverage>  to 250> 3 units; 251 to 300> 6 units; 301 to 350> 9 units; 351 to 400> 12 units; 401 + give 15 units       LOSARTAN POTASSIUM PO Take 100 mg by mouth daily Hold for SBP <115       acetaminophen (TYLENOL) 500 MG tablet Take 500 mg by mouth every 4 hours (while awake) And q 4 hrs prn> max g / 24 hrs       SIMVASTATIN PO Take 10 mg by mouth At Bedtime        senna-docusate (SENOKOT-S;PERICOLACE) 8.6-50 MG per tablet Take 1 tablet by mouth 2 times daily 100 tablet      cholecalciferol (VITAMIN D3) 1000 UNIT tablet Take 1,000 Units by mouth daily       insulin glargine (LANTUS) 100 UNIT/ML PEN Inject 30 Units Subcutaneous every morning Raise dose by one unit if AM glucoses are over 130.        "clopidogrel (PLAVIX) 75 MG tablet Take 1 tablet (75 mg) by mouth daily 90 tablet 1     donepezil (ARICEPT) 10 MG tablet Take 1 tablet (10 mg) by mouth At Bedtime 90 tablet 1     carvedilol (COREG) 12.5 MG tablet Take 1 tablet (12.5 mg) by mouth 2 times daily (with meals) 180 tablet 1     hydrALAZINE (APRESOLINE) 50 MG tablet Take 1 tablet (50 mg) by mouth 3 times daily Take at 8AM, at 2PM, and at 9PM. 90 tablet 1     nitroglycerin (NITROSTAT) 0.4 MG SL tablet Place 1 tablet (0.4 mg) under the tongue every 5 minutes as needed for chest pain If still having symptoms after 3 doses (15 minutes) call 911. 25 tablet 0     polyethylene glycol 0.4%- propylene glycol 0.3% (SYSTANE) 0.4-0.3 % SOLN ophthalmic solution Place 1 drop into both eyes 2 times daily as needed        Medications reviewed:  Medications reconciled to facility chart and changes were made to reflect current medications as identified as above med list. Below are the changes that were made:   Medications stopped since last EPIC medication reconciliation:   There are no discontinued medications.    Medications started since last [facility] medication reconciliation:  No orders of the defined types were placed in this encounter.        Information reviewed:  Medications, vital signs, orders, and nursing notes.    ROS:  4 point ROS including Respiratory, CV, GI and , other than that noted in the HPI,  is negative    Exam:  Vitals: /58  Pulse 61  Temp 97.2  F (36.2  C)  Resp 18  Ht 5' 1.5\" (1.562 m)  Wt 130 lb 6.4 oz (59.1 kg)  SpO2 97%  BMI 24.24 kg/m2  BMI= Body mass index is 24.24 kg/(m^2).  GENERAL APPEARANCE:  Alert, in no distress, pleasant mood; seated in wheelchair  RESP:  lungs clear to auscultation , no respiratory distress  CV:  Palpation and auscultation of heart done , regular rate and rhythm, no murmur, rub, or gallop  ABDOMEN:  Soft, Nontender; bowel sounds present  M/S:   Bilateral amputations; she is independent- able to place and " remove protheses with minima assistance  PSYCH:  Affect appropriate.    Lab/Diagnostic data:  CBC RESULTS:   Recent Labs   Lab Test  04/19/17   0640  02/22/17   0805   WBC  3.6*  4.9   RBC  2.73*  3.01*   HGB  8.3*  9.0*   HCT  26.0*  27.9*   MCV  95  93   MCH  30.4  29.9   MCHC  31.9  32.3   RDW  14.9  14.0   PLT  144*  178       Last Basic Metabolic Panel:  Recent Labs   Lab Test  04/19/17   0640  12/12/16   0535   NA  142  143   POTASSIUM  4.6  4.2   CHLORIDE  109  110*   VIDHI  8.4*  9.0   CO2  28  26   BUN  35*  32*   CR  1.66*  1.39*   GLC  73  76       Liver Function Studies -   Recent Labs   Lab Test  05/10/17   0610  06/22/16   1249   PROTTOTAL  6.7*  6.7*   ALBUMIN  3.3*  3.5   BILITOTAL  0.3  0.4   ALKPHOS  41  50   AST  17  21   ALT  18  26     TSH   Date Value Ref Range Status   10/05/2011 1.35 0.4 - 5.0 mU/L Final   10/15/2010 1.30 0.4 - 5.0 mU/L Final     Lab Results   Component Value Date    A1C 6.5 01/30/2017    A1C 7.6 09/14/2016     ASSESSMENT/PLAN  (E11.51) Type 2 diabetes mellitus with peripheral vascular disease (H) diabetes since 1980  (primary encounter diagnosis)  Comment: peripheral vascular disease with B BKA  Plan: continue current dose of insulin    (S88.911D,  S88.912D) Amputation of both lower extremities, subsequent encounter (H)  Comment: bilateral prostheses  Plan: independent    (E13.22,  I12.9,  N18.3) Secondary DM with CKD stage 3 and hypertension (H)  Comment: renal function and DM reviewed; recent bp a little elevated but within range   BP Readings from Last 3 Encounters:   05/31/17 149/58   05/23/17 154/60   04/12/17 136/59      Plan: no change in medications    (E11.59,  Z79.4,  E11.65) Improved;  controlled type 2 diabetes mellitus with other circulatory complication, with long-term current use of insulin (H)  Comment: Avg 200  Lab Results   Component Value Date    A1C 6.5 01/30/2017    A1C 7.6 09/14/2016     Plan: no change in DM management    (I25.10) ASCVD (arteriosclerotic  cardiovascular disease)  Comment: Plavix  Well tolerated; no falls; able to transfer self into wheelchair, toilet, etc.  Plan: no changes in meds    (F48.9) Lopez  Comment: also associated paranoia has been well controlled wtih low dose Seroquel; atempts at GDR could be harmful to self or others; family in agreement to continue  Plan: continue current medications    (I10) Benign essential hypertension  Comment: several agents to control BP  Plan: continue same meds    Adelaide Lujan MD  Internal Medicine  electronically signed

## 2017-06-02 ENCOUNTER — NURSING HOME VISIT (OUTPATIENT)
Dept: FAMILY MEDICINE | Facility: CLINIC | Age: 82
End: 2017-06-02
Payer: MEDICARE

## 2017-06-02 DIAGNOSIS — R45.89 MOODY: ICD-10-CM

## 2017-06-02 DIAGNOSIS — I25.10 ASCVD (ARTERIOSCLEROTIC CARDIOVASCULAR DISEASE): ICD-10-CM

## 2017-06-02 DIAGNOSIS — I10 BENIGN ESSENTIAL HYPERTENSION: ICD-10-CM

## 2017-06-02 DIAGNOSIS — E13.22 SECONDARY DM WITH CKD STAGE 3 AND HYPERTENSION (H): ICD-10-CM

## 2017-06-02 DIAGNOSIS — S88.911D AMPUTATION OF BOTH LOWER EXTREMITIES, SUBSEQUENT ENCOUNTER (H): ICD-10-CM

## 2017-06-02 DIAGNOSIS — I12.9 SECONDARY DM WITH CKD STAGE 3 AND HYPERTENSION (H): ICD-10-CM

## 2017-06-02 DIAGNOSIS — E11.51 TYPE 2 DIABETES MELLITUS WITH PERIPHERAL VASCULAR DISEASE (H): Primary | ICD-10-CM

## 2017-06-02 DIAGNOSIS — N18.30 SECONDARY DM WITH CKD STAGE 3 AND HYPERTENSION (H): ICD-10-CM

## 2017-06-02 DIAGNOSIS — S88.912D AMPUTATION OF BOTH LOWER EXTREMITIES, SUBSEQUENT ENCOUNTER (H): ICD-10-CM

## 2017-06-02 PROCEDURE — 99309 SBSQ NF CARE MODERATE MDM 30: CPT | Performed by: INTERNAL MEDICINE

## 2017-06-02 NOTE — MR AVS SNAPSHOT
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)        Uncontrolled type 2 diabetes mellitus with other circulatory complication, with long-term current use of insulin (H)        ASCVD (arteriosclerotic cardiovascular disease)        Lopez        Benign essential hypertension           Follow-ups after your visit        Your next 10 appointments already scheduled     Jun 23, 2017  8:20 AM CDT   Complete Exam with Buster Gamez MD   [facility])    [facility]  [address]  [facility] [address]   [phone]            Aug 09, 2017  2:00 PM CDT   Phone Device Check with LI TECH1   [facility] [place] ([facility])    [address] [place] [id number] [phone]              Who to contact     If you have questions or need follow up information about today's clinic visit or your schedule please contact [facility] directly at [phone].  Normal or non-critical lab and imaging results will be communicated to you by MyChart, letter or phone within 4 business days after the clinic has received the results. If you do not hear from us within 7 days, please contact the clinic through MyChart or phone. If you have a critical or abnormal lab result, we will notify you by phone as soon as possible.  Submit refill requests through [facility] or call your pharmacy and they will  "forward the refill request to us. Please allow 3 business days for your refill to be completed.          Additional Information About Your Visit        MyChart Information     [employer] lets you send messages to your doctor, view your test results, renew your prescriptions, schedule appointments and more. To sign up, go to [email or web address] . Click on \"Log in\" on the left side of the screen, which will take you to the Welcome page. Then click on \"Sign up Now\" on the right side of the page.     You will be asked to enter the access code listed below, as well as some personal information. Please follow the directions to create your username and password.     Your access code is: [email or web address]  Expires: 2017  2:11 PM     Your access code will  in 90 days. If you need help or a new code, please call your [place] clinic or [phone].        Care EveryWhere ID     This is your Care EveryWhere ID. This could be used by other organizations to access your [place] medical records  [id number]        Your Vitals Were     Pulse Temperature Respirations Height Pulse Oximetry BMI (Body Mass Index)    61 97.2  F (36.2  C) 18 5' 1.5\" (1.562 m) 97% 24.24 kg/m2       Blood Pressure from Last 3 Encounters:   17 149/58   17 154/60   17 136/59    Weight from Last 3 Encounters:   17 130 lb 6.4 oz (59.1 kg)   17 128 lb (58.1 kg)   17 130 lb 3.2 oz (59.1 kg)              Today, you had the following     No orders found for display       Primary Care Provider Office Phone # Fax #    La AL Hurtado [phone]       [facility] [address]  [address]  [facility] [address]        Thank you!     Thank you for choosing [facility]  for your care. Our goal is always to provide you with excellent care. Hearing back from our patients is one way we can continue to improve our services. Please take a few minutes to complete the " written survey that you may receive in the mail after your visit with us. Thank you!             Your Updated Medication List - Protect others around you: Learn how to safely use, store and throw away your medicines at www.disposemymeds.org.          This list is accurate as of: 6/2/17  9:05 AM.  Always use your most recent med list.                   Brand Name Dispense Instructions for use    amLODIPine 10 MG tablet    NORVASC     Take 10 mg by mouth daily at 5PM       carvedilol 12.5 MG tablet    COREG    180 tablet    Take 1 tablet (12.5 mg) by mouth 2 times daily (with meals)       cholecalciferol 1000 UNIT tablet    vitamin D     Take 1,000 Units by mouth daily       clopidogrel 75 MG tablet    PLAVIX    90 tablet    Take 1 tablet (75 mg) by mouth daily       diclofenac 1 % Gel topical gel    VOLTAREN     Apply 2 g topically 4 times daily Apply to L side of low back and into posterior L thigh       donepezil 10 MG tablet    ARICEPT    90 tablet    Take 1 tablet (10 mg) by mouth At Bedtime       fluticasone 50 MCG/ACT spray    FLONASE     Spray 1 spray into both nostrils 2 times daily as needed       GLYCERIN (ADULT) 80.7 % Supp   Generic drug:  Glycerin (Laxative)      Place 1 suppository rectally daily as needed (constipation)       GUAIFENESIN PO      Take 20 mLs by mouth every 4 hours as needed       hydrALAZINE 50 MG tablet    APRESOLINE    90 tablet    Take 1 tablet (50 mg) by mouth 3 times daily Take at 8AM, at 2PM, and at 9PM.       insulin aspart 100 UNIT/ML injection    NovoLOG PEN     Inject Subcutaneous 3 times daily (with meals) Sliding scale coverage>  to 250> 3 units; 251 to 300> 6 units; 301 to 350> 9 units; 351 to 400> 12 units; 401 + give 15 units       insulin glargine 100 UNIT/ML injection    LANTUS     Inject 30 Units Subcutaneous every morning Raise dose by one unit if AM glucoses are over 130.       LOSARTAN POTASSIUM PO      Take 100 mg by mouth daily Hold for SBP <115        MULTIVITAMIN PO      Take 1 Tablespoonful by mouth daily (with breakfast) With iron       nitroglycerin 0.4 MG sublingual tablet    NITROSTAT    25 tablet    Place 1 tablet (0.4 mg) under the tongue every 5 minutes as needed for chest pain If still having symptoms after 3 doses (15 minutes) call 911.       PREDNISONE PO      Take 2.5 mg by mouth every morning       senna-docusate 8.6-50 MG per tablet    SENOKOT-S;PERICOLACE    100 tablet    Take 1 tablet by mouth 2 times daily       SEROQUEL PO      Take 12.5 mg by mouth At Bedtime       SIMVASTATIN PO      Take 10 mg by mouth At Bedtime       SYSTANE 0.4-0.3 % Soln ophthalmic solution   Generic drug:  polyethylene glycol 0.4%- propylene glycol 0.3%      Place 1 drop into both eyes 2 times daily as needed       * TYLENOL 500 MG tablet   Generic drug:  acetaminophen      Take 500 mg by mouth every 4 hours (while awake) And q 4 hrs prn> max g / 24 hrs       * acetaminophen 500 MG tablet    TYLENOL     Take 500 mg by mouth 4 times daily       * Notice:  This list has 2 medication(s) that are the same as other medications prescribed for you. Read the directions carefully, and ask your doctor or other care provider to review them with you.

## 2017-08-09 ENCOUNTER — ALLIED HEALTH/NURSE VISIT (OUTPATIENT)
Dept: CARDIOLOGY | Facility: CLINIC | Age: 82
End: 2017-08-09
Payer: MEDICARE

## 2017-08-09 DIAGNOSIS — Z95.0 CARDIAC PACEMAKER IN SITU: Primary | ICD-10-CM

## 2017-08-09 PROCEDURE — 93293 PM PHONE R-STRIP DEVICE EVAL: CPT | Performed by: INTERNAL MEDICINE

## 2017-08-09 NOTE — MR AVS SNAPSHOT
After Visit Summary   8/9/2017    Cinthia Whittington    MRN: [id number]           Patient Information     Date Of Birth          11/5/1924        Visit Information        Provider Department      8/9/2017 2:00 PM LI TECH1 [facility]        Today's Diagnoses     Cardiac pacemaker in situ    -  1       Follow-ups after your visit        Your next 10 appointments already scheduled     Aug 11, 2017  8:00 AM CDT   Nursing Home with AL Kim CNP   [facility]    [address]  [facility] [address]   [phone]            Oct 11, 2017  2:00 PM CDT   Complete Exam with Buster Gamez MD   [facility] ([facility])    [facility]  [address]  [facility] [id number] [phone]            Nov 15, 2017  2:00 PM CST   Phone Device Check with GUILLERMO SANTANA   [facility] ([facility])    [address] [facility] [address]  [facility] [address]   [phone]              Who to contact     If you have questions or need follow up information about today's clinic visit or your schedule please contact [facility] directly at [phone].  Normal or non-critical lab and imaging results will be communicated to you by MyChart, letter or phone within 4 business days after the clinic has received the results. If you do not hear from us within 7 days, please contact the clinic through MyChart or phone. If you have a critical or abnormal lab result, we will notify you by phone as soon as possible.  Submit refill requests through [employer] or call your pharmacy and they will forward the refill request to us. Please allow 3 business days for your refill to be completed.          Additional Information About Your Visit       "  MyChart Information     [employer] lets you send messages to your doctor, view your test results, renew your prescriptions, schedule appointments and more. To sign up, go to [email or web address] . Click on \"Log in\" on the left side of the screen, which will take you to the Welcome page. Then click on \"Sign up Now\" on the right side of the page.     You will be asked to enter the access code listed below, as well as some personal information. Please follow the directions to create your username and password.     Your access code is: [email or web address]  Expires: 2017  2:17 PM     Your access code will  in 90 days. If you need help or a new code, please call your [place] clinic or [phone].        Care EveryWhere ID     This is your Care EveryWhere ID. This could be used by other organizations to access your [place] medical records  [id number]         Blood Pressure from Last 3 Encounters:   17 149/58   17 154/60   17 136/59    Weight from Last 3 Encounters:   17 59.1 kg (130 lb 6.4 oz)   17 58.1 kg (128 lb)   17 59.1 kg (130 lb 3.2 oz)              We Performed the Following     PM PHONE R STRIP EVAL UP TO 90 DAYS ([id number])        Primary Care Provider Office Phone # Fax #    [facility], APRN [facility] [phone]       [address] [place] [address]        Equal Access to Services     [facility]: Hadii aad ku hadasho Soomaali, waaxda luqadaha, qaybta kaalmada matthew, vincent richey . So [facility] [phone].    ATENCIÓN: Si habla español, tiene a real disposición servicios gratuitos de asistencia lingüística. Llame al [phone].    We comply with applicable federal civil rights laws and [place] laws. We do not discriminate on the basis of race, color, national origin, age, disability sex, sexual orientation or gender identity.            Thank you!     Thank you for choosing [facility] " [facility] [place]  for your care. Our goal is always to provide you with excellent care. Hearing back from our patients is one way we can continue to improve our services. Please take a few minutes to complete the written survey that you may receive in the mail after your visit with us. Thank you!             Your Updated Medication List - Protect others around you: Learn how to safely use, store and throw away your medicines at www.disposemymeds.org.          This list is accurate as of: 8/9/17  2:17 PM.  Always use your most recent med list.                   Brand Name Dispense Instructions for use Diagnosis    amLODIPine 10 MG tablet    NORVASC     Take 10 mg by mouth daily at 5PM        carvedilol 12.5 MG tablet    COREG    180 tablet    Take 1 tablet (12.5 mg) by mouth 2 times daily (with meals)    Essential hypertension, benign       cholecalciferol 1000 UNIT tablet    vitamin D     Take 1,000 Units by mouth daily        clopidogrel 75 MG tablet    PLAVIX    90 tablet    Take 1 tablet (75 mg) by mouth daily    PVD (peripheral vascular disease) (H)       diclofenac 1 % Gel topical gel    VOLTAREN     Apply 2 g topically 4 times daily Apply to L side of low back and into posterior L thigh        donepezil 10 MG tablet    ARICEPT    90 tablet    Take 1 tablet (10 mg) by mouth At Bedtime    Dementia without behavioral disturbance, unspecified dementia type       fluticasone 50 MCG/ACT spray    FLONASE     Spray 1 spray into both nostrils 2 times daily as needed        Glycerin (Laxative) 80.7 % Supp      Place 1 suppository rectally daily as needed (constipation)        GUAIFENESIN PO      Take 20 mLs by mouth every 4 hours as needed        hydrALAZINE 50 MG tablet    APRESOLINE    90 tablet    Take 1 tablet (50 mg) by mouth 3 times daily Take at 8AM, at 2PM, and at 9PM.    Essential hypertension with goal blood pressure less than 140/90       insulin aspart 100 UNIT/ML injection    NovoLOG PEN      Inject Subcutaneous 3 times daily (with meals) Sliding scale coverage>  to 250> 3 units; 251 to 300> 6 units; 301 to 350> 9 units; 351 to 400> 12 units; 401 + give 15 units        insulin glargine 100 UNIT/ML injection    LANTUS     Inject 30 Units Subcutaneous every morning Raise dose by one unit if AM glucoses are over 130.        LOSARTAN POTASSIUM PO      Take 100 mg by mouth daily Hold for SBP <115        MULTIVITAMIN PO      Take 1 Tablespoonful by mouth daily (with breakfast) With iron        nitroGLYcerin 0.4 MG sublingual tablet    NITROSTAT    25 tablet    Place 1 tablet (0.4 mg) under the tongue every 5 minutes as needed for chest pain If still having symptoms after 3 doses (15 minutes) call 911.    Chest pain, unspecified chest pain type       PREDNISONE PO      Take 2.5 mg by mouth every morning        senna-docusate 8.6-50 MG per tablet    SENOKOT-S;PERICOLACE    100 tablet    Take 1 tablet by mouth 2 times daily    Other constipation       SEROQUEL PO      Take 12.5 mg by mouth At Bedtime        SIMVASTATIN PO      Take 10 mg by mouth At Bedtime        SYSTANE 0.4-0.3 % Soln ophthalmic solution   Generic drug:  polyethylene glycol 0.4%- propylene glycol 0.3%      Place 1 drop into both eyes 2 times daily as needed        * TYLENOL 500 MG tablet   Generic drug:  acetaminophen      Take 500 mg by mouth every 4 hours (while awake) And q 4 hrs prn> max g / 24 hrs        * acetaminophen 500 MG tablet    TYLENOL     Take 500 mg by mouth 4 times daily        * Notice:  This list has 2 medication(s) that are the same as other medications prescribed for you. Read the directions carefully, and ask your doctor or other care provider to review them with you.

## 2017-08-14 ENCOUNTER — NURSING HOME VISIT (OUTPATIENT)
Dept: GERIATRICS | Facility: CLINIC | Age: 82
End: 2017-08-14
Payer: MEDICARE

## 2017-08-14 VITALS
DIASTOLIC BLOOD PRESSURE: 67 MMHG | BODY MASS INDEX: 24.75 KG/M2 | TEMPERATURE: 97.1 F | RESPIRATION RATE: 18 BRPM | HEART RATE: 60 BPM | WEIGHT: 134.5 LBS | SYSTOLIC BLOOD PRESSURE: 157 MMHG | OXYGEN SATURATION: 96 % | HEIGHT: 62 IN

## 2017-08-14 DIAGNOSIS — E11.311 DIABETIC MACULAR EDEMA, LEFT EYE (H): ICD-10-CM

## 2017-08-14 DIAGNOSIS — I50.32 CHRONIC DIASTOLIC CONGESTIVE HEART FAILURE (H): Primary | ICD-10-CM

## 2017-08-14 DIAGNOSIS — Z89.519: ICD-10-CM

## 2017-08-14 DIAGNOSIS — H35.81 DIABETIC MACULAR EDEMA, LEFT EYE (H): ICD-10-CM

## 2017-08-14 DIAGNOSIS — E11.51 TYPE 2 DIABETES MELLITUS WITH PERIPHERAL VASCULAR DISEASE (H): ICD-10-CM

## 2017-08-14 PROCEDURE — 99309 SBSQ NF CARE MODERATE MDM 30: CPT | Performed by: NURSE PRACTITIONER

## 2017-08-14 RX ORDER — SENNOSIDES 8.6 MG
1 TABLET ORAL DAILY
COMMUNITY
End: 2018-01-01

## 2017-08-14 NOTE — PROGRESS NOTES
[facility]    Chief Complaint   Patient presents with     half-way Regulatory       HPI:    Cinthia Whittington is a 89 year old  (11/5/1924), who is being seen today for a federally mandated E/M visit at [facility].  HPI information obtained from: facility chart records.   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- is stable per exam  Her son stated that she will have an eye exam in September and potentially an injection into the eye    Type 2 diabetes mellitus with peripheral vascular disease (H) diabetes since 1980  Lab Results   Component Value Date    A1C 6.5 01/30/2017    A1C 7.6 09/14/2016    A1C 8.2 06/22/2016    A1C 8.3 12/08/2014    A1C 7.8 06/10/2014     BGTs 160 to 320 ~190    History of amputation below knee, 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  She still is able to put on the prosthesis by herself      ALLERGIES: Lisinopril and Remeron [mirtazapine]  PAST MEDICAL HISTORY:  has a past medical history of Anemia of chronic disease; ASCVD (arteriosclerotic cardiovascular disease) (10/15/2010); Blood loss anemia (2/5/2013); Carotid artery disease (H); Dementia; Diabetes mellitus (H); Diabetic macular edema (H); Diabetic neuropathy (H); GERD (gastroesophageal reflux disease); Hearing loss; HTN; Hypercholesteremia; Lymphedema; Multiple thyroid nodules; Posterior vitreous detachment of both eyes; PVD (peripheral vascular disease) (H) (2009); and Type 2 diabetes mellitus, uncontrolled (H).  PAST SURGICAL HISTORY:  has a past surgical history that includes SPINE FUSION,ANTER,3 SGMTS; hysterectomy, pap no longer indicated; SLING OPERATION FOR STRESS INCONTINENCE; VEIN IN SITU BYPASS GRAFT,FEM-POP (4/9/10); Amputate leg below knee (1/30/2013); cataract iol, rt/lt (2-29-00,3-27-00); Heart  Cath, Angioplasty (8/2010); Amputate leg below knee (2010); Eye surgery; R TKA; and history of low back surgery.  FAMILY HISTORY: family history includes DIABETES in her mother; Family History Negative in her brother, brother, brother, daughter, and son; Respiratory in her father.  SOCIAL HISTORY:  reports that she quit smoking about 38 years ago. She has never used smokeless tobacco. She reports that she drinks about 1.8 oz of alcohol per week  She reports that she does not use illicit drugs.    MEDICATIONS:  Current Outpatient Prescriptions   Medication Sig Dispense Refill     sennosides (SENOKOT) 8.6 MG tablet Take 1 tablet by mouth daily       Nystatin POWD Apply topically 2 times daily as needed       HYDRALAZINE HCL PO Take 75 mg by mouth 3 times daily       GUAIFENESIN PO Take 20 mLs by mouth every 4 hours as needed        QUEtiapine Fumarate (SEROQUEL PO) Take 12.5 mg by mouth At Bedtime       fluticasone (FLONASE) 50 MCG/ACT spray Spray 1 spray into both nostrils 2 times daily as needed        acetaminophen (TYLENOL) 500 MG tablet Take 500 mg by mouth 3 times daily        Multiple Vitamins-Minerals (MULTIVITAMIN PO) Take 1 Tablespoonful by mouth daily (with breakfast) With iron       PREDNISONE PO Take 2.5 mg by mouth every morning       diclofenac (VOLTAREN) 1 % GEL topical gel Apply 2 g topically 4 times daily Apply to L side of low back and into posterior L thigh       Glycerin, Laxative, (GLYCERIN, ADULT,) 80.7 % SUPP Place 1 suppository rectally daily as needed (constipation)       amLODIPine (NORVASC) 10 MG tablet Take 10 mg by mouth daily at 5PM       insulin aspart (INSULIN ASPART) 100 UNIT/ML soln Inject Subcutaneous 3 times daily (with meals) Sliding scale coverage>  to 250> 3 units; 251 to 300> 6 units; 301 to 350> 9 units; 351 to 400> 12 units; 401 + give 15 units       LOSARTAN POTASSIUM PO Take 100 mg by mouth daily Hold for SBP <115       acetaminophen (TYLENOL) 500 MG tablet Take 500  mg by mouth every 4 hours (while awake) Max 3g/24hrs       SIMVASTATIN PO Take 10 mg by mouth At Bedtime        cholecalciferol (VITAMIN D3) 1000 UNIT tablet Take 1,000 Units by mouth daily       insulin glargine (LANTUS) 100 UNIT/ML PEN Inject 30 Units Subcutaneous every morning Raise dose by one unit if AM glucoses are over 130.       clopidogrel (PLAVIX) 75 MG tablet Take 1 tablet (75 mg) by mouth daily 90 tablet 1     donepezil (ARICEPT) 10 MG tablet Take 1 tablet (10 mg) by mouth At Bedtime 90 tablet 1     carvedilol (COREG) 12.5 MG tablet Take 1 tablet (12.5 mg) by mouth 2 times daily (with meals) 180 tablet 1     nitroglycerin (NITROSTAT) 0.4 MG SL tablet Place 1 tablet (0.4 mg) under the tongue every 5 minutes as needed for chest pain If still having symptoms after 3 doses (15 minutes) call 911. 25 tablet 0     polyethylene glycol 0.4%- propylene glycol 0.3% (SYSTANE) 0.4-0.3 % SOLN ophthalmic solution Place 1 drop into both eyes 2 times daily as needed        Medications reviewed:  Medications reconciled to facility chart and changes were made to reflect current medications as identified as above med list. Below are the changes that were made:   Medications stopped since last EPIC medication reconciliation:   Medications Discontinued During This Encounter   Medication Reason     senna-docusate (SENOKOT-S;PERICOLACE) 8.6-50 MG per tablet Medication Reconciliation Clean Up       Medications started since last [facility] medication reconciliation:  Orders Placed This Encounter   Medications     sennosides (SENOKOT) 8.6 MG tablet     Sig: Take 1 tablet by mouth daily     Nystatin POWD     Sig: Apply topically 2 times daily as needed       Case Management:  I have reviewed the care plan and MDS and do agree with the plan. Patient's desire to return to the community is not present.  Information reviewed:  Medications, vital signs, orders, and nursing notes.    ROS:  4 point ROS including Respiratory, CV, GI and ,  "other than that noted in the HPI,  is negative    Exam:  Vitals: /67  Pulse 60  Temp 97.1  F (36.2  C)  Resp 18  Ht 5' 1.5\" (1.562 m)  Wt 134 lb 8 oz (61 kg)  SpO2 96%  BMI 25 kg/m2  BMI= Body mass index is 25 kg/(m^2).  GENERAL APPEARANCE:  Alert, in no distress, cooperative, pleasant  ENT:  Mouth and posterior oropharynx normal, moist mucous membranes, [place], does in well in 1;1  EYES:  EOM, conjunctivae, lids, pupils and irises normal, has functional vision  RESP:  respiratory effort and palpation of chest normal, lungs clear to auscultation , no respiratory distress  CV:  Palpation and auscultation of heart done , regular rate and rhythm, no murmur, rub, or gallop, no edema  ABDOMEN:  normal bowel sounds, soft, nontender, no hepatosplenomegaly or other masses  M/S:   Gait and station abnormal > she is able to stand and transfer with standby assist; she does not walk,she likes to have her w/c pushed by others  SKIN:  Solar discoloration and dry skin on her hands  NEURO:   Cranial nerves 2-12 are normal tested and grossly at patient's baseline  PSYCH:  oriented to herself and her surroundings, insight and judgement impaired, memory impaired , affect and mood normal, she tends to be nasty to other residents     BP 08/07-08/13: 148-166/61-70 mmHg  BGL:  mg/dL    Lab/Diagnostic data:    CBC RESULTS:   Recent Labs   Lab Test  04/19/17   0640  02/22/17   0805   WBC  3.6*  4.9   RBC  2.73*  3.01*   HGB  8.3*  9.0*   HCT  26.0*  27.9*   MCV  95  93   MCH  30.4  29.9   MCHC  31.9  32.3   RDW  14.9  14.0   PLT  144*  178       Last Basic Metabolic Panel:  Recent Labs   Lab Test  04/19/17   0640  12/12/16   0535   NA  142  143   POTASSIUM  4.6  4.2   CHLORIDE  109  110*   VIDHI  8.4*  9.0   CO2  28  26   BUN  35*  32*   CR  1.66*  1.39*   GLC  73  76       Liver Function Studies -   Recent Labs   Lab Test  05/10/17   0610  06/22/16   1249   PROTTOTAL  6.7*  6.7*   ALBUMIN  3.3*  3.5   BILITOTAL  0.3  0.4 " "  ALKPHOS  41  50   AST  17  21   ALT  18  26       Lab Results   Component Value Date    A1C 6.5 01/30/2017    A1C 7.6 09/14/2016       ASSESSMENT/PLAN  Chronic diastolic congestive heart failure (H)  I did increase the Apresoline form 50 mg tid to 75 mg po tid> same times  Nursing will monitor the BP    Diabetic macular edema, 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- is stable per exam  No new tx> will follow recommendations from ophthalmologist    Type 2 diabetes mellitus with peripheral vascular disease (H) diabetes since 1980  The son is pleased with the way his mother's T2DM is managed> he stated \"the best ever\"  Will cont with current tx    History of amputation below knee, unspecified laterality (H)  No new tx  I support her in applying the prosthesis and standing and transferring with assist      Orders:  See A&P    Total time spent with patient visit at the skilled nursing facility was 35 min including patient visit, review of past records, phone call to patient contact and and visit with the son. Greater than 50% of total time spent with counseling and coordinating care due to complexity of care and risks of HTN and CHF    Electronically signed by:  AL iKm CNP    "

## 2017-09-05 ENCOUNTER — DOCUMENTATION ONLY (OUTPATIENT)
Dept: OTHER | Facility: CLINIC | Age: 82
End: 2017-09-05

## 2017-09-05 DIAGNOSIS — Z71.89 ADVANCED DIRECTIVES, COUNSELING/DISCUSSION: Chronic | ICD-10-CM

## 2017-09-06 ENCOUNTER — DOCUMENTATION ONLY (OUTPATIENT)
Dept: OTHER | Facility: CLINIC | Age: 82
End: 2017-09-06

## 2017-09-25 NOTE — PROGRESS NOTES
"[place] [facility]    Chief Complaint   Patient presents with     Nursing Home Acute       HPI:    Cinthia Whittington is a 89 year old  (11/5/1924), who is being seen today for an episodic care visit at [facility].  HPI information obtained from: facility chart records.  Today's concern is:  Secondary DM with CKD stage 3 and hypertension (H)  GFR is 29, creat 1.66    Type 2 diabetes mellitus with stage 3 chronic kidney disease, with long-term current use of insulin (H)  BGTs have been 170 to 330  No hypoglycemia    Paranoia (H)  She often laments that nobody care and they all ignore her    Dementia with behavioral disturbance, unspecified dementia type  She becomes easily upset and demands care \"right now\"      ALLERGIES: Lisinopril and Remeron [mirtazapine]  Past Medical, Surgical, Family and Social History reviewed and updated in [facility].    Current Outpatient Prescriptions   Medication Sig Dispense Refill     FEXOFENADINE HCL PO Take 1,200 mg by mouth every 12 hours       sennosides (SENOKOT) 8.6 MG tablet Take 1 tablet by mouth daily       Nystatin POWD Apply topically 2 times daily as needed       HYDRALAZINE HCL PO Take 75 mg by mouth 3 times daily       GUAIFENESIN PO Take 20 mLs by mouth every 4 hours as needed        QUEtiapine Fumarate (SEROQUEL PO) Take 12.5 mg by mouth At Bedtime       fluticasone (FLONASE) 50 MCG/ACT spray Spray 1 spray into both nostrils 2 times daily as needed        acetaminophen (TYLENOL) 500 MG tablet Take 500 mg by mouth 3 times daily        Multiple Vitamins-Minerals (MULTIVITAMIN PO) Take 1 Tablespoonful by mouth daily (with breakfast) With iron       PREDNISONE PO Take 2.5 mg by mouth every morning       diclofenac (VOLTAREN) 1 % GEL topical gel Apply 2 g topically 4 times daily Apply to L side of low back and into posterior L thigh       Glycerin, Laxative, (GLYCERIN, ADULT,) 80.7 % SUPP Place 1 suppository rectally daily as needed " (constipation)       amLODIPine (NORVASC) 10 MG tablet Take 10 mg by mouth daily at 5PM       insulin aspart (INSULIN ASPART) 100 UNIT/ML soln Inject Subcutaneous 3 times daily (with meals) Sliding scale coverage>  to 250> 3 units; 251 to 300> 6 units; 301 to 350> 9 units; 351 to 400> 12 units; 401 + give 15 units       LOSARTAN POTASSIUM PO Take 100 mg by mouth daily Hold for SBP <115       acetaminophen (TYLENOL) 500 MG tablet Take 500 mg by mouth every 4 hours (while awake) Max 3g/24hrs       SIMVASTATIN PO Take 10 mg by mouth At Bedtime        cholecalciferol (VITAMIN D3) 1000 UNIT tablet Take 1,000 Units by mouth daily       insulin glargine (LANTUS) 100 UNIT/ML PEN Inject 30 Units Subcutaneous every morning Raise dose by one unit if AM glucoses are over 130.       clopidogrel (PLAVIX) 75 MG tablet Take 1 tablet (75 mg) by mouth daily 90 tablet 1     donepezil (ARICEPT) 10 MG tablet Take 1 tablet (10 mg) by mouth At Bedtime 90 tablet 1     carvedilol (COREG) 12.5 MG tablet Take 1 tablet (12.5 mg) by mouth 2 times daily (with meals) 180 tablet 1     nitroglycerin (NITROSTAT) 0.4 MG SL tablet Place 1 tablet (0.4 mg) under the tongue every 5 minutes as needed for chest pain If still having symptoms after 3 doses (15 minutes) call 911. 25 tablet 0     polyethylene glycol 0.4%- propylene glycol 0.3% (SYSTANE) 0.4-0.3 % SOLN ophthalmic solution Place 1 drop into both eyes 2 times daily as needed        Medications reviewed:  Medications reconciled to facility chart and changes were made to reflect current medications as identified as above med list. Below are the changes that were made:   Medications stopped since last EPIC medication reconciliation:   There are no discontinued medications.    Medications started since last [facility] medication reconciliation:  Orders Placed This Encounter   Medications     FEXOFENADINE HCL PO     Sig: Take 1,200 mg by mouth every 12 hours       REVIEW OF SYSTEMS:  4 point ROS  "including Respiratory, CV, GI and , other than that noted in the HPI,  is negative    Physical Exam:  /87  Pulse 60  Temp 97.8  F (36.6  C)  Resp 16  Ht 5' 1.5\" (1.562 m)  Wt 136 lb (61.7 kg)  SpO2 96%  BMI 25.28 kg/m2  GENERAL APPEARANCE:  somnolent, > then woke up  ENT:  Mouth and posterior oropharynx normal, moist mucous membranes, [place], does well in 1:1  EYES:  EOM, conjunctivae, lids, pupils and irises normal, has impaired  RESP:  respiratory effort and palpation of chest normal, lungs clear to auscultation , no respiratory distress  CV:  Palpation and auscultation of heart done , regular rate and rhythm, no murmur, rub, or gallop, no edema  ABDOMEN:  normal bowel sounds, soft, nontender, no hepatosplenomegaly or other masses  M/S:   Gait and station abnormal > she has mason BKA> she is able to apply the prosthesis with stand by assist  She can transfer with stand by assist of nursing staff   SKIN:  Soft and intact  NEURO:   Cranial nerves 2-12 are normal tested and grossly at patient's baseline  PSYCH:  oriented to herself, insight and judgement impaired, memory impaired , affect and mood normal, she was sleepy at the onset of the visit  She had not taken the am meds> but then did take them when she was reproached     BP 09/10-09/23: 138-203/59-87 mmHg  BGL 09/17-09/24:  NOON:187- 263 mg/dL  PM: 170-343 mg/dL    Recent Labs:    CBC RESULTS:   Recent Labs   Lab Test  04/19/17   0640  02/22/17   0805   WBC  3.6*  4.9   RBC  2.73*  3.01*   HGB  8.3*  9.0*   HCT  26.0*  27.9*   MCV  95  93   MCH  30.4  29.9   MCHC  31.9  32.3   RDW  14.9  14.0   PLT  144*  178       Last Basic Metabolic Panel:  Recent Labs   Lab Test  04/19/17   0640  12/12/16   0535   NA  142  143   POTASSIUM  4.6  4.2   CHLORIDE  109  110*   VIDHI  8.4*  9.0   CO2  28  26   BUN  35*  32*   CR  1.66*  1.39*   GLC  73  76       Liver Function Studies -   Recent Labs   Lab Test  05/10/17   0610  06/22/16   1249   PROTTOTAL  6.7*  6.7* " "  ALBUMIN  3.3*  3.5   BILITOTAL  0.3  0.4   ALKPHOS  41  50   AST  17  21   ALT  18  26     Lab Results   Component Value Date    A1C 6.5 01/30/2017    A1C 7.6 09/14/2016       Assessment/Plan:  Secondary DM with CKD stage 3 and hypertension (H)  I believe that some her BP elevations are \"white coat\"   She becomes anxious when approached  I will cont with her current HTN meds  Her son has told me that in the past she would become hypotensive when her meds were increased    Type 2 diabetes mellitus with stage 3 chronic kidney disease, with long-term current use of insulin (H)  Overall her BGTs are fair  Because she is 89 yrs old tight blood glucose control is not advised    Paranoia (H)  Decrease of Seroquel 12.5 mg  may cause emotional harm  No change in tx    Dementia with behavioral disturbance, unspecified dementia type  She still has nice superficial social skills, she is a kaplan person and impatient.      Orders:  none      Time> 30 min    Electronically signed by  AL Kim CNP                  "

## 2017-10-05 NOTE — PROGRESS NOTES
[place] [facility]    Chief Complaint   Patient presents with     detention Regulatory       HPI:    Cinthia Whittington is a 89 year old  (11/5/1924), who is being seen today for a federally mandated E/M visit at [facility].  HPI information obtained from: facility chart records, facility staff, patient report and [facility] chart review. Today's concerns are:  1. Type 2 diabetes mellitus with peripheral vascular disease (H) diabetes since 1980    2. Secondary DM with CKD stage 3 and hypertension (H)    3. S/P bilateral below knee amputation (H)    4. Diabetic macular edema, 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- is stable per exam    5. Chronic diastolic congestive heart failure (H)        ALLERGIES: Lisinopril and Remeron [mirtazapine]  PAST MEDICAL HISTORY:  has a past medical history of Anemia of chronic disease; ASCVD (arteriosclerotic cardiovascular disease) (10/15/2010); Blood loss anemia (2/5/2013); Carotid artery disease (H); Dementia; Diabetes mellitus (H); Diabetic macular edema (H); Diabetic neuropathy (H); GERD (gastroesophageal reflux disease); Hearing loss; HTN; Hypercholesteremia; Lymphedema; Multiple thyroid nodules; Posterior vitreous detachment of both eyes; PVD (peripheral vascular disease) (H) (2009); and Type 2 diabetes mellitus, uncontrolled (H).  PAST SURGICAL HISTORY:  has a past surgical history that includes SPINE FUSION,ANTER,3 SGMTS; hysterectomy, pap no longer indicated; SLING OPERATION FOR STRESS INCONTINENCE; VEIN IN SITU BYPASS GRAFT,FEM-POP (4/9/10); Amputate leg below knee (1/30/2013); cataract iol, rt/lt (2-29-00,3-27-00); Heart Cath, Angioplasty (8/2010); Amputate leg below knee (2010); Eye surgery; R TKA; and history of low back surgery.  FAMILY HISTORY: family history includes DIABETES in her mother; Family History Negative in her brother, brother, brother, daughter, and son; Respiratory in her father.  SOCIAL HISTORY:  reports that she quit  smoking about 38 years ago. She has never used smokeless tobacco. She reports that she drinks about 1.8 oz of alcohol per week  She reports that she does not use illicit drugs.    MEDICATIONS:  Current Outpatient Prescriptions   Medication Sig Dispense Refill     FEXOFENADINE HCL PO Take 1,200 mg by mouth every 12 hours       sennosides (SENOKOT) 8.6 MG tablet Take 1 tablet by mouth daily       Nystatin POWD Apply topically 2 times daily as needed       HYDRALAZINE HCL PO Take 75 mg by mouth 3 times daily       GUAIFENESIN PO Take 20 mLs by mouth every 4 hours as needed        QUEtiapine Fumarate (SEROQUEL PO) Take 12.5 mg by mouth At Bedtime       fluticasone (FLONASE) 50 MCG/ACT spray Spray 1 spray into both nostrils 2 times daily as needed        acetaminophen (TYLENOL) 500 MG tablet Take 500 mg by mouth 3 times daily        Multiple Vitamins-Minerals (MULTIVITAMIN PO) Take 1 Tablespoonful by mouth daily (with breakfast) With iron       PREDNISONE PO Take 2.5 mg by mouth every morning       diclofenac (VOLTAREN) 1 % GEL topical gel Apply 2 g topically 4 times daily Apply to L side of low back and into posterior L thigh       Glycerin, Laxative, (GLYCERIN, ADULT,) 80.7 % SUPP Place 1 suppository rectally daily as needed (constipation)       amLODIPine (NORVASC) 10 MG tablet Take 10 mg by mouth daily at 5PM       insulin aspart (INSULIN ASPART) 100 UNIT/ML soln Inject Subcutaneous 3 times daily (with meals) Sliding scale coverage>  to 250> 3 units; 251 to 300> 6 units; 301 to 350> 9 units; 351 to 400> 12 units; 401 + give 15 units       LOSARTAN POTASSIUM PO Take 100 mg by mouth daily Hold for SBP <115       acetaminophen (TYLENOL) 500 MG tablet Take 500 mg by mouth every 4 hours (while awake) Max 3g/24hrs       SIMVASTATIN PO Take 10 mg by mouth At Bedtime        cholecalciferol (VITAMIN D3) 1000 UNIT tablet Take 1,000 Units by mouth daily       insulin glargine (LANTUS) 100 UNIT/ML PEN Inject 30 Units  "Subcutaneous every morning Raise dose by one unit if AM glucoses are over 130.       clopidogrel (PLAVIX) 75 MG tablet Take 1 tablet (75 mg) by mouth daily 90 tablet 1     donepezil (ARICEPT) 10 MG tablet Take 1 tablet (10 mg) by mouth At Bedtime 90 tablet 1     carvedilol (COREG) 12.5 MG tablet Take 1 tablet (12.5 mg) by mouth 2 times daily (with meals) 180 tablet 1     nitroglycerin (NITROSTAT) 0.4 MG SL tablet Place 1 tablet (0.4 mg) under the tongue every 5 minutes as needed for chest pain If still having symptoms after 3 doses (15 minutes) call 911. 25 tablet 0     polyethylene glycol 0.4%- propylene glycol 0.3% (SYSTANE) 0.4-0.3 % SOLN ophthalmic solution Place 1 drop into both eyes 2 times daily as needed        Medications reviewed:  Medications reconciled to facility chart and changes were made to reflect current medications as identified as above med list. Below are the changes that were made:   Medications stopped since last EPIC medication reconciliation:   There are no discontinued medications.    Medications started since last [facility] medication reconciliation:  No orders of the defined types were placed in this encounter.        ROS:  4 point ROS including Respiratory, CV, GI and , other than that noted in the HPI,  is negative    Exam:  Vitals: /79  Pulse 68  Temp 97.9  F (36.6  C)  Resp 16  Ht 5' 1.5\" (1.562 m)  Wt 133 lb 5.6 oz (60.5 kg)  SpO2 96%  BMI 24.79 kg/m2  BMI= Body mass index is 24.79 kg/(m^2).  GENERAL APPEARANCE:  Alert, in no distress; sitting up in wheelchair; son present  RESP:  lungs clear to auscultation , no respiratory distress  CV:  regular rate and rhythm with systolic murmur noted  ABDOMEN:  Soft, BS present; nontender  M/S:   Seated in wheelchair, bilateral prostheses  PSYCH:  affect and mood normal    Lab/Diagnostic data:   CBC RESULTS:   Recent Labs   Lab Test  04/19/17   0640  02/22/17   0805   WBC  3.6*  4.9   RBC  2.73*  3.01*   HGB  8.3*  9.0*   HCT  26.0*  " 27.9*   MCV  95  93   MCH  30.4  29.9   MCHC  31.9  32.3   RDW  14.9  14.0   PLT  144*  178       Last Basic Metabolic Panel:  Recent Labs   Lab Test  04/19/17   0640  12/12/16   0535   NA  142  143   POTASSIUM  4.6  4.2   CHLORIDE  109  110*   VIDHI  8.4*  9.0   CO2  28  26   BUN  35*  32*   CR  1.66*  1.39*   GLC  73  76       Liver Function Studies -   Recent Labs   Lab Test  05/10/17   0610  06/22/16   1249   PROTTOTAL  6.7*  6.7*   ALBUMIN  3.3*  3.5   BILITOTAL  0.3  0.4   ALKPHOS  41  50   AST  17  21   ALT  18  26       TSH   Date Value Ref Range Status   10/05/2011 1.35 0.4 - 5.0 mU/L Final   10/15/2010 1.30 0.4 - 5.0 mU/L Final     Lab Results   Component Value Date    A1C 6.5 01/30/2017    A1C 7.6 09/14/2016       ASSESSMENT/PLAN  (E11.51) Type 2 diabetes mellitus with peripheral vascular disease (H) diabetes since 1980  (primary encounter diagnosis)  Comment:   Lab Results   Component Value Date    A1C 6.5 01/30/2017      well controlled on current regimen  Plan: continue same meds    (E13.22,  I12.9,  N18.3) Secondary DM with CKD stage 3 and hypertension (H)  Comment:   Lab Results   Component Value Date    CR 1.66 04/19/2017       Plan: adjust meds as needed due to renal function. Continue same bp meds.    DM with PVD- bilateral BKA  Transfers to wheelchair.    (E11.311,  H35.81) Diabetic macular edema, 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- is stable per exam  Comment: pt follows with Dr. Gamez, retina specialist  Plan: continue per ophthalmology recommendations    (I50.32) Chronic diastolic congestive heart failure (H)  Comment: meds reviewed; overall, doing well  Plan: The current medical regimen is effective;  continue present plan and medications.    Adelaide Lujan MD  Internal Medicine  electronically signed

## 2017-10-06 NOTE — MR AVS SNAPSHOT
After Visit Summary   10/6/2017    Cinthia Whittington    MRN: [id number]           Patient Information     Date Of Birth          11/5/1924        Visit Information        Provider Department      10/6/2017 11:33 AM Adelaide Lujan MD [facility]        Today's Diagnoses     Type 2 diabetes mellitus with peripheral vascular disease (H) diabetes since 1980    -  1    Secondary DM with CKD stage 3 and hypertension (H)        S/P bilateral below knee amputation (H)        Diabetic macular edema, 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- is stable per exam        Chronic diastolic congestive heart failure (H)           Follow-ups after your visit        Your next 10 appointments already scheduled     Oct 11, 2017  2:00 PM CDT   Complete Exam with Buster Gamez MD   [facility])    [facility]  [address]  [facility] [address] [phone]            Nov 15, 2017  2:00 PM CST   Phone Device Check with LI TECH1   [facility] [place] ([facility])    [address] [place], [place] [id number]  [facility] [address] [phone]              Who to contact     If you have questions or need follow up information about today's clinic visit or your schedule please contact [facility] directly at [phone].  Normal or non-critical lab and imaging results will be communicated to you by MyChart, letter or phone within 4 business days after the clinic has received the results. If you do not hear from us within 7 days, please contact the clinic through MyChart or phone. If you have a critical or abnormal lab result, we will notify you by phone as soon as possible.  Submit refill requests through [employer] or call your pharmacy and they will forward the refill request to us. Please allow 3 business days for your refill to be completed.          Additional Information  "About Your Visit        [facility] Information     [employer] lets you send messages to your doctor, view your test results, renew your prescriptions, schedule appointments and more. To sign up, go to [email or web address] . Click on \"Log in\" on the left side of the screen, which will take you to the Welcome page. Then click on \"Sign up Now\" on the right side of the page.     You will be asked to enter the access code listed below, as well as some personal information. Please follow the directions to create your username and password.     Your access code is: [email or web address]  Expires: 2017  2:17 PM     Your access code will  in 90 days. If you need help or a new code, please call your [facility] clinic or [phone].        Care EveryWhere ID     This is your Care EveryWhere ID. This could be used by other organizations to access your [facility] medical records  [id number]        Your Vitals Were     Pulse Temperature Respirations Height Pulse Oximetry BMI (Body Mass Index)    68 97.9  F (36.6  C) 16 5' 1.5\" (1.562 m) 96% 24.79 kg/m2       Blood Pressure from Last 3 Encounters:   10/05/17 136/79   17 147/87   17 157/67    Weight from Last 3 Encounters:   10/05/17 133 lb 5.6 oz (60.5 kg)   17 136 lb (61.7 kg)   17 134 lb 8 oz (61 kg)              Today, you had the following     No orders found for display       Primary Care Provider Office Phone # Fax #    La Elidiarifelicita Huston, APRN -[phone]       [address] [address]        Equal Access to Services     ALVAREZ KUHN : Zakiya Dsouza, tahira cuevas, skyler kaalmada matthew, vincent wilburn. So [facility] [phone].    ATENCIÓN: Si habla español, tiene a real disposición servicios gratuitos de asistencia lingüística. Llame al [phone].    We comply with applicable federal civil rights laws and [place] laws. We do not discriminate on the basis of " race, color, national origin, age, disability, sex, sexual orientation, or gender identity.            Thank you!     Thank you for choosing [facility]  for your care. Our goal is always to provide you with excellent care. Hearing back from our patients is one way we can continue to improve our services. Please take a few minutes to complete the written survey that you may receive in the mail after your visit with us. Thank you!             Your Updated Medication List - Protect others around you: Learn how to safely use, store and throw away your medicines at www.disposemymeds.org.          This list is accurate as of: 10/6/17  8:38 AM.  Always use your most recent med list.                   Brand Name Dispense Instructions for use Diagnosis    amLODIPine 10 MG tablet    NORVASC     Take 10 mg by mouth daily at 5PM        carvedilol 12.5 MG tablet    COREG    180 tablet    Take 1 tablet (12.5 mg) by mouth 2 times daily (with meals)    Essential hypertension, benign       cholecalciferol 1000 UNIT tablet    vitamin D     Take 1,000 Units by mouth daily        clopidogrel 75 MG tablet    PLAVIX    90 tablet    Take 1 tablet (75 mg) by mouth daily    PVD (peripheral vascular disease) (H)       diclofenac 1 % Gel topical gel    VOLTAREN     Apply 2 g topically 4 times daily Apply to L side of low back and into posterior L thigh        donepezil 10 MG tablet    ARICEPT    90 tablet    Take 1 tablet (10 mg) by mouth At Bedtime    Dementia without behavioral disturbance, unspecified dementia type       FEXOFENADINE HCL PO      Take 1,200 mg by mouth every 12 hours        fluticasone 50 MCG/ACT spray    FLONASE     Spray 1 spray into both nostrils 2 times daily as needed        Glycerin (Laxative) 80.7 % Supp      Place 1 suppository rectally daily as needed (constipation)        GUAIFENESIN PO      Take 20 mLs by mouth every 4 hours as needed        HYDRALAZINE HCL PO      Take 75 mg by mouth 3 times  daily        insulin aspart 100 UNIT/ML injection    NovoLOG PEN     Inject Subcutaneous 3 times daily (with meals) Sliding scale coverage>  to 250> 3 units; 251 to 300> 6 units; 301 to 350> 9 units; 351 to 400> 12 units; 401 + give 15 units        insulin glargine 100 UNIT/ML injection    LANTUS     Inject 30 Units Subcutaneous every morning Raise dose by one unit if AM glucoses are over 130.        LOSARTAN POTASSIUM PO      Take 100 mg by mouth daily Hold for SBP <115        MULTIVITAMIN PO      Take 1 Tablespoonful by mouth daily (with breakfast) With iron        nitroGLYcerin 0.4 MG sublingual tablet    NITROSTAT    25 tablet    Place 1 tablet (0.4 mg) under the tongue every 5 minutes as needed for chest pain If still having symptoms after 3 doses (15 minutes) call 911.    Chest pain, unspecified chest pain type       Nystatin Powd      Apply topically 2 times daily as needed        PREDNISONE PO      Take 2.5 mg by mouth every morning        sennosides 8.6 MG tablet    SENOKOT     Take 1 tablet by mouth daily        SEROQUEL PO      Take 12.5 mg by mouth At Bedtime        SIMVASTATIN PO      Take 10 mg by mouth At Bedtime        SYSTANE 0.4-0.3 % Soln ophthalmic solution   Generic drug:  polyethylene glycol 0.4%- propylene glycol 0.3%      Place 1 drop into both eyes 2 times daily as needed        * TYLENOL 500 MG tablet   Generic drug:  acetaminophen      Take 500 mg by mouth every 4 hours (while awake) Max 3g/24hrs        * acetaminophen 500 MG tablet    TYLENOL     Take 500 mg by mouth 3 times daily        * Notice:  This list has 2 medication(s) that are the same as other medications prescribed for you. Read the directions carefully, and ask your doctor or other care provider to review them with you.

## 2017-10-11 NOTE — MR AVS SNAPSHOT
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      Your next 10 appointments already scheduled     Nov 15, 2017  2:00 PM CST   Phone Device Check with LI TECH1   [facility] ([facility])    [address] [place] [id number] [phone]              Who to contact     Please call your clinic at [phone] to:    Ask questions about your health    Make or cancel appointments    Discuss your medicines    Learn about your test results    Speak to your doctor   If you have compliments or concerns about an experience at your clinic, or if you wish to file a complaint, please contact [facility] Patient Relations at [phone] or email us at [email or web address]         Additional Information About Your Visit        MyChart Information     [employer] is an electronic gateway that provides easy, online access to your medical records. With Email Data Source, you can request a clinic appointment, read your test results, renew a prescription or communicate with your care team.     To sign up for [employer] visit the website at [email or web address]   You will be asked to enter the access code listed  below, as well as some personal information. Please follow the directions to create your username and password.     Your access code is: [email or web address]  Expires: 2018  7:24 AM     Your access code will  in 90 days. If you need help or a new code, please contact your [facility] or call [phone] for assistance.        Care EveryWhere ID     This is your Care EveryWhere ID. This could be used by other organizations to access your [facility] medical records  [id number]         Blood Pressure from Last 3 Encounters:   11/10/17 157/65   10/23/17 151/77   10/05/17 136/79    Weight from Last 3 Encounters:   11/10/17 60.3 kg (133 lb)   10/23/17 61 kg (134 lb 8 oz)   10/05/17 60.5 kg (133 lb 5.6 oz)              Today, you had the following     No orders found for display         Today's Medication Changes          These changes are accurate as of: 10/11/17 11:59 PM.  If you have any questions, ask your nurse or doctor.               Stop taking these medicines if you haven't already. Please contact your care team if you have questions.     SYSTANE 0.4-0.3 % Soln ophthalmic solution   Generic drug:  polyethylene glycol 0.4%- propylene glycol 0.3%   Stopped by:  Buster Gamez MD                    Primary Care Provider Office Phone # Fax #    La Garciafelicita FalconChandler, APRN [facility] [phone]       [address] [address]        Equal Access to Services     : Hadii hayder mcguire hadasho Soomaali, waaxda luqadaha, qaybta kaalmada adefabien, vincent richey . So [facility] [phone].    ATENCIÓN: Si habla español, tiene a real disposición servicios gratuitos de asistencia lingüística. Llame al [phone].    We comply with applicable federal civil rights laws and [place] laws. We do not discriminate on the basis of race, color, national origin, age, disability, sex, sexual orientation, or gender identity.             Thank you!     Thank you for choosing [facility]  for your care. Our goal is always to provide you with excellent care. Hearing back from our patients is one way we can continue to improve our services. Please take a few minutes to complete the written survey that you may receive in the mail after your visit with us. Thank you!             Your Updated Medication List - Protect others around you: Learn how to safely use, store and throw away your medicines at www.disposemymeds.org.          This list is accurate as of: 10/11/17 11:59 PM.  Always use your most recent med list.                   Brand Name Dispense Instructions for use Diagnosis    amLODIPine 10 MG tablet    NORVASC     Take 10 mg by mouth daily at 5PM        carvedilol 12.5 MG tablet    COREG    180 tablet    Take 1 tablet (12.5 mg) by mouth 2 times daily (with meals)    Essential hypertension, benign       cholecalciferol 1000 UNIT tablet    vitamin D3     Take 1,000 Units by mouth daily        clopidogrel 75 MG tablet    PLAVIX    90 tablet    Take 1 tablet (75 mg) by mouth daily    PVD (peripheral vascular disease) (H)       diclofenac 1 % Gel topical gel    VOLTAREN     Apply 2 g topically 4 times daily Apply to L side of low back and into posterior L thigh        donepezil 10 MG tablet    ARICEPT    90 tablet    Take 1 tablet (10 mg) by mouth At Bedtime    Dementia without behavioral disturbance, unspecified dementia type       FEXOFENADINE HCL PO      Take 1,200 mg by mouth every 12 hours        fluticasone 50 MCG/ACT spray    FLONASE     Spray 1 spray into both nostrils 2 times daily as needed        Glycerin (Laxative) 80.7 % Supp      Place 1 suppository rectally daily as needed (constipation)        GUAIFENESIN PO      Take 20 mLs by mouth every 4 hours as needed        HYDRALAZINE HCL PO      Take 75 mg by mouth 3 times daily        insulin aspart 100 UNIT/ML injection    NovoLOG PEN     Inject Subcutaneous 3  times daily (with meals) Sliding scale coverage>  to 250> 3 units; 251 to 300> 6 units; 301 to 350> 9 units; 351 to 400> 12 units; 401 + give 15 units        insulin glargine 100 UNIT/ML injection    LANTUS     Inject 30 Units Subcutaneous every morning Raise dose by one unit if AM glucoses are over 130.        LOSARTAN POTASSIUM PO      Take 100 mg by mouth daily Hold for SBP <115        MULTIVITAMIN PO      Take 1 Tablespoonful by mouth daily (with breakfast) With iron        nitroGLYcerin 0.4 MG sublingual tablet    NITROSTAT    25 tablet    Place 1 tablet (0.4 mg) under the tongue every 5 minutes as needed for chest pain If still having symptoms after 3 doses (15 minutes) call 911.    Chest pain, unspecified chest pain type       Nystatin Powd      Apply topically 2 times daily as needed        PREDNISONE PO      Take 2.5 mg by mouth every morning        sennosides 8.6 MG tablet    SENOKOT     Take 1 tablet by mouth daily        SEROQUEL PO      Take 12.5 mg by mouth At Bedtime        SIMVASTATIN PO      Take 10 mg by mouth At Bedtime        * TYLENOL 500 MG tablet   Generic drug:  acetaminophen      Take 500 mg by mouth every 4 hours (while awake) Max 3g/24hrs        * acetaminophen 500 MG tablet    TYLENOL     Take 500 mg by mouth 3 times daily        * Notice:  This list has 2 medication(s) that are the same as other medications prescribed for you. Read the directions carefully, and ask your doctor or other care provider to review them with you.

## 2017-10-23 NOTE — PROGRESS NOTES
"[facility]    Chief Complaint   Patient presents with     Nursing Home Acute     Nasal Congestion       HPI:    Cintiha Whittington is a 89 year old  (11/5/1924), who is being seen today for an episodic care visit at [facility].  HPI information obtained from: facility chart records.  Today's concern is:  Runny nose  Her son reported that his mother always has the \"sniffles\", she agreed to that    Cough  Occ cough with clear phlegm    Chronic diastolic congestive heart failure (H)  Has intermittent elevated BPs      ALLERGIES: Lisinopril and Remeron [mirtazapine]  Past Medical, Surgical, Family and Social History reviewed and updated in EPIC.    Current Outpatient Prescriptions   Medication Sig Dispense Refill     polyethylene glycol 0.4%- propylene glycol 0.3% (SYSTANE ULTRA) 0.4-0.3 % SOLN ophthalmic solution Place 1 drop into both eyes 2 times daily as needed for dry eyes       [START ON 10/24/2017] Cetirizine HCl (ZYRTEC ALLERGY PO) Take 5 mg by mouth daily       FEXOFENADINE HCL PO Take 1,200 mg by mouth every 12 hours       sennosides (SENOKOT) 8.6 MG tablet Take 1 tablet by mouth daily       Nystatin POWD Apply topically 2 times daily as needed       HYDRALAZINE HCL PO Take 75 mg by mouth 3 times daily       GUAIFENESIN PO Take 20 mLs by mouth every 4 hours as needed        QUEtiapine Fumarate (SEROQUEL PO) Take 12.5 mg by mouth At Bedtime       fluticasone (FLONASE) 50 MCG/ACT spray Spray 1 spray into both nostrils 2 times daily as needed        Multiple Vitamins-Minerals (MULTIVITAMIN PO) Take 1 Tablespoonful by mouth daily (with breakfast) With iron       PREDNISONE PO Take 2.5 mg by mouth every morning       diclofenac (VOLTAREN) 1 % GEL topical gel Apply 2 g topically 4 times daily Apply to L side of low back and into posterior L thigh       Glycerin, Laxative, (GLYCERIN, ADULT,) 80.7 % SUPP Place 1 suppository rectally daily as needed (constipation)       " amLODIPine (NORVASC) 10 MG tablet Take 10 mg by mouth daily at 5PM       insulin aspart (INSULIN ASPART) 100 UNIT/ML soln Inject Subcutaneous 3 times daily (with meals) Sliding scale coverage>  to 250> 3 units; 251 to 300> 6 units; 301 to 350> 9 units; 351 to 400> 12 units; 401 + give 15 units       LOSARTAN POTASSIUM PO Take 100 mg by mouth daily Hold for SBP <115       acetaminophen (TYLENOL) 500 MG tablet Take 500 mg by mouth every 4 hours (while awake) Max 3g/24hrs       SIMVASTATIN PO Take 10 mg by mouth At Bedtime        cholecalciferol (VITAMIN D3) 1000 UNIT tablet Take 1,000 Units by mouth daily       insulin glargine (LANTUS) 100 UNIT/ML PEN Inject 30 Units Subcutaneous every morning Raise dose by one unit if AM glucoses are over 130.       clopidogrel (PLAVIX) 75 MG tablet Take 1 tablet (75 mg) by mouth daily 90 tablet 1     donepezil (ARICEPT) 10 MG tablet Take 1 tablet (10 mg) by mouth At Bedtime 90 tablet 1     carvedilol (COREG) 12.5 MG tablet Take 1 tablet (12.5 mg) by mouth 2 times daily (with meals) 180 tablet 1     nitroglycerin (NITROSTAT) 0.4 MG SL tablet Place 1 tablet (0.4 mg) under the tongue every 5 minutes as needed for chest pain If still having symptoms after 3 doses (15 minutes) call 911. 25 tablet 0     acetaminophen (TYLENOL) 500 MG tablet Take 500 mg by mouth 3 times daily        Medications reviewed:  Medications reconciled to facility chart and changes were made to reflect current medications as identified as above med list. Below are the changes that were made:   Medications stopped since last EPIC medication reconciliation:   There are no discontinued medications.    Medications started since last [facility] medication reconciliation:  Orders Placed This Encounter   Medications     polyethylene glycol 0.4%- propylene glycol 0.3% (SYSTANE ULTRA) 0.4-0.3 % SOLN ophthalmic solution     Sig: Place 1 drop into both eyes 2 times daily as needed for dry eyes       REVIEW OF SYSTEMS:  4  "point ROS including Respiratory, CV, GI and , other than that noted in the HPI,  is negative    Physical Exam:  /77  Pulse 65  Temp 97.8  F (36.6  C)  Resp 18  Ht 5' 1.5\" (1.562 m)  Wt 134 lb 8 oz (61 kg)  SpO2 95%  BMI 25 kg/m2  GENERAL APPEARANCE:  Alert, in no distress  ENT:  Mouth and posterior oropharynx normal, moist mucous membranes, [place], nose no nasal discharge or congestion  She did not blow her nose while I was with her  RESP:  respiratory effort and palpation of chest normal, lungs clear to auscultation , no respiratory distress, no cough  CV:  Palpation and auscultation of heart done , regular rate and rhythm, no murmur, rub, or gallop  PSYCH:  oriented to herself, insight and judgement impaired, memory impaired , affect and mood normal     BP 10/15-10/22:  142/71 mmHg  BGL:  NOON: 177-296 mg/dL  PM:149-363 mg/dL    Recent Labs:    CBC RESULTS:   Recent Labs   Lab Test  04/19/17   0640  02/22/17   0805   WBC  3.6*  4.9   RBC  2.73*  3.01*   HGB  8.3*  9.0*   HCT  26.0*  27.9*   MCV  95  93   MCH  30.4  29.9   MCHC  31.9  32.3   RDW  14.9  14.0   PLT  144*  178       Last Basic Metabolic Panel:  Recent Labs   Lab Test  04/19/17   0640  12/12/16   0535   NA  142  143   POTASSIUM  4.6  4.2   CHLORIDE  109  110*   VIDHI  8.4*  9.0   CO2  28  26   BUN  35*  32*   CR  1.66*  1.39*   GLC  73  76       Liver Function Studies -   Recent Labs   Lab Test  05/10/17   0610  06/22/16   1249   PROTTOTAL  6.7*  6.7*   ALBUMIN  3.3*  3.5   BILITOTAL  0.3  0.4   ALKPHOS  41  50   AST  17  21   ALT  18  26       Lab Results   Component Value Date    A1C 6.5 01/30/2017    A1C 7.6 09/14/2016     Assessment/Plan:  Runny nose  I think she may have some allergies to all the dust from the construction @ [facility]  Will try Zyrtec 5 mg po q am    Cough  She will cont with the scheduled Mucinex    Chronic diastolic congestive heart failure (H)  I did review her BPs  She often does have elevated BPs, she becomes anxious " when the BP   I did stop the daily BP checks and will do BP checks only q week and prn    Orders:  See A&P    Total time spent with patient visit at the skilled nursing facility was 25 min including patient visit, review of past records and phone call to patient contact. Greater than 50% of total time spent with counseling and coordinating care due to she has complex care needs    Electronically signed by  AL Kim CNP

## 2017-11-10 NOTE — PROGRESS NOTES
[facility]    Chief Complaint   Patient presents with     Nursing Home Acute     amputation       HPI:    Cinthia Whittington is a 89 year old  (11/5/1924), who is being seen today for an episodic care visit at [facility].  HPI information obtained from: facility chart records.  Today's concern is: the purpose of today's visit was a face to face meeting for a prosthesis  Type 2 diabetes mellitus with peripheral vascular disease (H) diabetes since 1980  Lab Results   Component Value Date    A1C 6.5 01/30/2017    A1C 7.6 09/14/2016    A1C 8.2 06/22/2016    A1C 8.3 12/08/2014    A1C 7.8 06/10/2014     BGTs ~ 130 to 240    Status post bilateral below knee amputation (H)  She does wear mason BKA prosthesis  She is able to put them on herself before she gets out of bed each am    Lopez  She is short tempered      Chronic left-sided low back pain with left-sided sciatica  Is not complaining at this time with low dose Prednisone 2.5 mg po daily    Vascular dementia with behavior disturbance  She often is demanding and abrasive in her behavior      ALLERGIES: Lisinopril and Remeron [mirtazapine]  Past Medical, Surgical, Family and Social History reviewed and updated in [employer].    Current Outpatient Prescriptions   Medication Sig Dispense Refill     polyethylene glycol 0.4%- propylene glycol 0.3% (SYSTANE ULTRA) 0.4-0.3 % SOLN ophthalmic solution Place 1 drop into both eyes 2 times daily as needed for dry eyes       Cetirizine HCl (ZYRTEC ALLERGY PO) Take 5 mg by mouth daily       FEXOFENADINE HCL PO Take 1,200 mg by mouth every 12 hours       sennosides (SENOKOT) 8.6 MG tablet Take 1 tablet by mouth daily       Nystatin POWD Apply topically 2 times daily as needed       HYDRALAZINE HCL PO Take 75 mg by mouth 3 times daily       GUAIFENESIN PO Take 20 mLs by mouth every 4 hours as needed        QUEtiapine Fumarate (SEROQUEL PO) Take 12.5 mg by mouth At Bedtime       fluticasone  (FLONASE) 50 MCG/ACT spray Spray 1 spray into both nostrils 2 times daily as needed        acetaminophen (TYLENOL) 500 MG tablet Take 500 mg by mouth 3 times daily        Multiple Vitamins-Minerals (MULTIVITAMIN PO) Take 1 Tablespoonful by mouth daily (with breakfast) With iron       PREDNISONE PO Take 2.5 mg by mouth every morning       diclofenac (VOLTAREN) 1 % GEL topical gel Apply 2 g topically 4 times daily Apply to L side of low back and into posterior L thigh       Glycerin, Laxative, (GLYCERIN, ADULT,) 80.7 % SUPP Place 1 suppository rectally daily as needed (constipation)       amLODIPine (NORVASC) 10 MG tablet Take 10 mg by mouth daily at 5PM       insulin aspart (INSULIN ASPART) 100 UNIT/ML soln Inject Subcutaneous 3 times daily (with meals) Sliding scale coverage>  to 250> 3 units; 251 to 300> 6 units; 301 to 350> 9 units; 351 to 400> 12 units; 401 + give 15 units       LOSARTAN POTASSIUM PO Take 100 mg by mouth daily Hold for SBP <115       acetaminophen (TYLENOL) 500 MG tablet Take 500 mg by mouth every 4 hours (while awake) Max 3g/24hrs       SIMVASTATIN PO Take 10 mg by mouth At Bedtime        cholecalciferol (VITAMIN D3) 1000 UNIT tablet Take 1,000 Units by mouth daily       insulin glargine (LANTUS) 100 UNIT/ML PEN Inject 30 Units Subcutaneous every morning Raise dose by one unit if AM glucoses are over 130.       clopidogrel (PLAVIX) 75 MG tablet Take 1 tablet (75 mg) by mouth daily 90 tablet 1     donepezil (ARICEPT) 10 MG tablet Take 1 tablet (10 mg) by mouth At Bedtime 90 tablet 1     carvedilol (COREG) 12.5 MG tablet Take 1 tablet (12.5 mg) by mouth 2 times daily (with meals) 180 tablet 1     nitroglycerin (NITROSTAT) 0.4 MG SL tablet Place 1 tablet (0.4 mg) under the tongue every 5 minutes as needed for chest pain If still having symptoms after 3 doses (15 minutes) call 911. 25 tablet 0     Medications reviewed:  Medications reconciled to facility chart and changes were made to reflect  "current medications as identified as above med list. Below are the changes that were made:   Medications stopped since last EPIC medication reconciliation:   There are no discontinued medications.    Medications started since last [facility] medication reconciliation:  No orders of the defined types were placed in this encounter.    REVIEW OF SYSTEMS:  4 point ROS including Respiratory, CV, GI and , other than that noted in the HPI,  is negative    Physical Exam:  /65  Pulse 61  Temp 98.2  F (36.8  C)  Resp 16  Ht 5' 1.5\" (1.562 m)  Wt 133 lb (60.3 kg)  SpO2 96%  BMI 24.72 kg/m2  GENERAL APPEARANCE:  Alert, in no distress, anxious, cooperative  ENT:  Mouth and posterior oropharynx normal, moist mucous membranes, [place], does fair in 1:1  EYES:  EOM, conjunctivae, lids, pupils and irises normal, has good eye contact  RESP:  respiratory effort and palpation of chest normal, lungs clear to auscultation , no respiratory distress, she did not get short of breath when she transferred from her w/c to the toilet  CV:  Palpation and auscultation of heart done , regular rate and rhythm, no murmur, rub, or gallop, no edema  ABDOMEN:  normal bowel sounds, soft, nontender, no hepatosplenomegaly or other masses  M/S:   Gait and station abnormal > she does apply the bilateral prosthesis her self  She can transfer with min assist from her w/c while using grab bars and a transfer belt  She can sit on the raised toilet with min assist  She does not walk in her room or in the hallways  Her son does help to get from the w/c into the Van when they go appointments  She has symmetrical movements and strength of all extremities  She has fair ROM of her UE  NEURO:   Cranial nerves 2-12 are normal tested and grossly at patient's baseline  Her standing balance is only fair and with assist of one person  PSYCH:  oriented to herself and her son, insight and judgement impaired, memory impaired , affect and mood normal, anxious alternating " with pleasant   BP 10/17-11/07: 142-170/60-87 mmHg  BGL 11/02-11/09:   NOON: 143-235 mg/dL  PM: 143-383 mg/dL    Recent Labs:   CBC RESULTS:   Recent Labs   Lab Test  04/19/17   0640  02/22/17   0805   WBC  3.6*  4.9   RBC  2.73*  3.01*   HGB  8.3*  9.0*   HCT  26.0*  27.9*   MCV  95  93   MCH  30.4  29.9   MCHC  31.9  32.3   RDW  14.9  14.0   PLT  144*  178       Last Basic Metabolic Panel:  Recent Labs   Lab Test  04/19/17   0640  12/12/16   0535   NA  142  143   POTASSIUM  4.6  4.2   CHLORIDE  109  110*   VIDHI  8.4*  9.0   CO2  28  26   BUN  35*  32*   CR  1.66*  1.39*   GLC  73  76       Liver Function Studies -   Recent Labs   Lab Test  05/10/17   0610  06/22/16   1249   PROTTOTAL  6.7*  6.7*   ALBUMIN  3.3*  3.5   BILITOTAL  0.3  0.4   ALKPHOS  41  50   AST  17  21   ALT  18  26       Lab Results   Component Value Date    A1C 6.5 01/30/2017    A1C 7.6 09/14/2016     Assessment/Plan:  Type 2 diabetes mellitus with peripheral vascular disease (H) diabetes since 1980  Her T2DM has fair control  Will cont with TID BGTs   Will cont with Novolog sliding scale coverage starting at   Will cont with current Lantus insulin 30 units daily  I will also obtain an A1c on 11-    Status post bilateral below knee amputation (H)  Without the prosthesis she would become total dependent for most ADLs     Lopez  Her moodiness is part of her personality  Medications will not lessen that  No new tx    Chronic left-sided low back pain with left-sided sciatica  She is doing very well with the low dose Prednisone 2.5 mg  I recommend to continue that for an indefinite time period    Vascular dementia with behavior disturbance  She is getting Seroquel 12.5 mg po @ HS> this has been beneficial  It has decreased her paranoia which was very prevalent at night time      Orders:  She need new prosthesis bilaterally for BKA        Documentation of Face-to-Face and Certification for Prosthetics    Patient: Cinthia Whittington    YOB: 1924  MR Number: [id number]  Today's Date: 11/10/2017    I certify that patient: Cinthia Whittington is under my care and that I, or a nurse practitioner or physician's assistant working with me, had a face-to-face encounter that meets the physician face-to-face encounter requirements with this patient on: 11/10/2017.    This encounter with the patient was in whole, or in part, for the following medical condition, which is the primary reason for : replacement of bilateral BKA prosthesis by [employer] [facility].      My clinical findings support the need for the above services because: [facility], further, I certify that my clinical findings support that this patient Is fitted for prosthesis.    Gait and station abnormal > she does apply the bilateral prosthesis her self  She can transfer with min assist from her w/c while using grab bars and a transfer belt  She can sit on the raised toilet with min assist  She does not walk in her room or in the hallways  Her son does help to get from the w/c into the Van when they go appointments  She has symmetrical movements and strength of all extremities  She has fair ROM of her UE    Without the prosthesis she would become total dependent for most ADLs      Based on the above findings. I certify that this patient is confined to the to the nursing home for most of her days.  The patient is under my care, and I have initiated the establishment of the plan of care.  This patient will be followed by a physician who will periodically review the plan of care.  Physician/Provider to provide follow up care: La [place]    Responsible Medicare certified PECOS Physician: Adelaide Lujan MD  Physician Signature: See electronic signature associated with these discharge orders.  Date: 11/10/2017    Total time spent with patient visit at the skilled nursing facility was 40  min including patient visit, review of past records and son was  present during my visit. Greater than 50% of total time spent with counseling and coordinating care due to she has complex care needs    Electronically signed by  AL Kim CNP

## 2017-11-13 NOTE — PROGRESS NOTES
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 or sooner as needed.    Complete documentation of historical and exam elements from today's encounter can  be found in the full encounter summary report (not reduplicated in this progress  note). I personally obtained the chief complaint(s) and history of present illness. I  confirmed and edited as necessary the review of systems, past medical/surgical  history, family history, social history, and examination findings as documented by  others; and I examined the patient myself. I personally reviewed the relevant tests,  images, and reports as documented above. I formulated and edited as necessary the  assessment and plan and discussed the findings and management plan with the  patient and family.    KEVIN Gamez M.D

## 2017-11-15 NOTE — PROGRESS NOTES
~ 90 day phone teletrace ~  Intrinsic rhythm at time of check. Magnet response WNL. Order placed for annual threshold and OV to be scheduled in February. Mary MAK

## 2017-11-15 NOTE — MR AVS SNAPSHOT
"              After Visit Summary   11/15/2017    Cinthia Whittington    MRN: [id number]           Patient Information     Date Of Birth          11/5/1924        Visit Information        Provider Department      11/15/2017 2:00 PM LI TECH1 [facility]   [place]        Today's Diagnoses     Cardiac pacemaker in situ    -  1       Follow-ups after your visit        Additional Services     Follow-Up with Cardiologist           Follow-Up with Device Clinic                 Future tests that were ordered for you today     Open Future Orders        Priority Expected Expires Ordered    Follow-Up with Device Clinic Routine 2/15/2018 12/20/2018 11/15/2017    Follow-Up with Cardiologist Routine 2/15/2018 3/30/2019 11/15/2017            Who to contact     If you have questions or need follow up information about today's clinic visit or your schedule please contact [facility]   [place] directly at [phone].  Normal or non-critical lab and imaging results will be communicated to you by MyChart, letter or phone within 4 business days after the clinic has received the results. If you do not hear from us within 7 days, please contact the clinic through [employer] or phone. If you have a critical or abnormal lab result, we will notify you by phone as soon as possible.  Submit refill requests through [employer] or call your pharmacy and they will forward the refill request to us. Please allow 3 business days for your refill to be completed.          Additional Information About Your Visit        MyChart Information     [employer] lets you send messages to your doctor, view your test results, renew your prescriptions, schedule appointments and more. To sign up, go to [email or web address] . Click on \"Log in\" on the left side of the screen, which will take you to the Welcome page. Then click on \"Sign up Now\" on the right side of the page.     You will be asked to enter the access " code listed below, as well as some personal information. Please follow the directions to create your username and password.     Your access code is: [email or web address]  Expires: 2018  7:24 AM     Your access code will  in 90 days. If you need help or a new code, please call your [facility] clinic or [phone].        Care EveryWhere ID     This is your Care EveryWhere ID. This could be used by other organizations to access your [facility] medical records  [id number]         Blood Pressure from Last 3 Encounters:   11/10/17 157/65   10/23/17 151/77   10/05/17 136/79    Weight from Last 3 Encounters:   11/10/17 60.3 kg (133 lb)   10/23/17 61 kg (134 lb 8 oz)   10/05/17 60.5 kg (133 lb 5.6 oz)              We Performed the Following     PM PHONE R STRIP EVAL UP TO 90 DAYS ([phone])        Primary Care Provider Office Phone # Fax #    [place] [facility] [phone]       [address]  [facility] [address]  [facility] [address]        Equal Access to Services     ALVAREZ KUHN : Hadii aad ku hadasho Soomaali, waaxda luqadaha, qaybta kaalmada adeegyada, waxay leain haydashn josselyn richey ah. So [facility] [phone].    ATENCIÓN: Si habla español, tiene a real disposición servicios gratuitos de asistencia lingüística. [facility] [phone].    We comply with applicable federal civil rights laws and [place] laws. We do not discriminate on the basis of race, color, national origin, age, disability, sex, sexual orientation, or gender identity.            Thank you!     Thank you for choosing [facility]  for your care. Our goal is always to provide you with excellent care. Hearing back from our patients is one way we can continue to improve our services. Please take a few minutes to complete the written survey that you may receive in the mail after your visit with us. Thank you!             Your Updated Medication List - Protect others around you: Learn how to  safely use, store and throw away your medicines at www.disposemymeds.org.          This list is accurate as of: 11/15/17  2:15 PM.  Always use your most recent med list.                   Brand Name Dispense Instructions for use Diagnosis    amLODIPine 10 MG tablet    NORVASC     Take 10 mg by mouth daily at 5PM        carvedilol 12.5 MG tablet    COREG    180 tablet    Take 1 tablet (12.5 mg) by mouth 2 times daily (with meals)    Essential hypertension, benign       cholecalciferol 1000 UNIT tablet    vitamin D3     Take 1,000 Units by mouth daily        clopidogrel 75 MG tablet    PLAVIX    90 tablet    Take 1 tablet (75 mg) by mouth daily    PVD (peripheral vascular disease) (H)       diclofenac 1 % Gel topical gel    VOLTAREN     Apply 2 g topically 4 times daily Apply to L side of low back and into posterior L thigh        donepezil 10 MG tablet    ARICEPT    90 tablet    Take 1 tablet (10 mg) by mouth At Bedtime    Dementia without behavioral disturbance, unspecified dementia type       FEXOFENADINE HCL PO      Take 1,200 mg by mouth every 12 hours        fluticasone 50 MCG/ACT spray    FLONASE     Spray 1 spray into both nostrils 2 times daily as needed        Glycerin (Laxative) 80.7 % Supp      Place 1 suppository rectally daily as needed (constipation)        GUAIFENESIN PO      Take 20 mLs by mouth every 4 hours as needed        HYDRALAZINE HCL PO      Take 75 mg by mouth 3 times daily        insulin aspart 100 UNIT/ML injection    NovoLOG PEN     Inject Subcutaneous 3 times daily (with meals) Sliding scale coverage>  to 250> 3 units; 251 to 300> 6 units; 301 to 350> 9 units; 351 to 400> 12 units; 401 + give 15 units        insulin glargine 100 UNIT/ML injection    LANTUS     Inject 30 Units Subcutaneous every morning Raise dose by one unit if AM glucoses are over 130.        LOSARTAN POTASSIUM PO      Take 100 mg by mouth daily Hold for SBP <115        MULTIVITAMIN PO      Take 1 Tablespoonful by  mouth daily (with breakfast) With iron        nitroGLYcerin 0.4 MG sublingual tablet    NITROSTAT    25 tablet    Place 1 tablet (0.4 mg) under the tongue every 5 minutes as needed for chest pain If still having symptoms after 3 doses (15 minutes) call 911.    Chest pain, unspecified chest pain type       Nystatin Powd      Apply topically 2 times daily as needed        polyethylene glycol 0.4%- propylene glycol 0.3% 0.4-0.3 % Soln ophthalmic solution    SYSTANE ULTRA     Place 1 drop into both eyes 2 times daily as needed for dry eyes        PREDNISONE PO      Take 2.5 mg by mouth every morning        sennosides 8.6 MG tablet    SENOKOT     Take 1 tablet by mouth daily        SEROQUEL PO      Take 12.5 mg by mouth At Bedtime        SIMVASTATIN PO      Take 10 mg by mouth At Bedtime        * TYLENOL 500 MG tablet   Generic drug:  acetaminophen      Take 500 mg by mouth every 4 hours (while awake) Max 3g/24hrs        * acetaminophen 500 MG tablet    TYLENOL     Take 500 mg by mouth 3 times daily        ZYRTEC ALLERGY PO      Take 5 mg by mouth daily        * Notice:  This list has 2 medication(s) that are the same as other medications prescribed for you. Read the directions carefully, and ask your doctor or other care provider to review them with you.

## 2017-12-11 PROBLEM — I10 HTN, GOAL BELOW 150/90: Status: ACTIVE | Noted: 2017-01-01

## 2017-12-11 PROBLEM — I12.9 HYPERTENSIVE KIDNEY DISEASE WITH CKD (CHRONIC KIDNEY DISEASE): Status: ACTIVE | Noted: 2017-01-01

## 2017-12-11 NOTE — PROGRESS NOTES
[facility]    Chief Complaint   Patient presents with     FCI Regulatory       HPI:    Cinthia Whittington is a 89 year old  (11/5/1924), who is being seen today for a federally mandated E/M visit at [facility].  HPI information obtained from: facility chart records.   Today's concerns are:  Status post bilateral below knee amputation (H)  She stated that she wishes to get new prosthesis, her current one feel heavy and clumsy    Type 2 diabetes mellitus with peripheral vascular disease (H) diabetes since 1980  BGT ~ 170 to 360  Lab Results   Component Value Date    A1C 6.5 01/30/2017    A1C 7.6 09/14/2016    A1C 8.2 06/22/2016    A1C 8.3 12/08/2014    A1C 7.8 06/10/2014     [place]  Her mood can and does change rapidly without any obvious cause    Hypertensive renal disease, stage 1 through stage 4 or unspecified chronic kidney disease  Last creat 1.66, GFR 29    HTN, goal below 150/90  BPs have been ~ 150/ 70      ALLERGIES: Lisinopril and Remeron [mirtazapine]  PAST MEDICAL HISTORY:  has a past medical history of Anemia of chronic disease; ASCVD (arteriosclerotic cardiovascular disease) (10/15/2010); Blood loss anemia (2/5/2013); Carotid artery disease (H); Dementia; Diabetes mellitus (H); Diabetic macular edema (H); Diabetic neuropathy (H); GERD (gastroesophageal reflux disease); Hearing loss; HTN; Hypercholesteremia; Lymphedema; Multiple thyroid nodules; Posterior vitreous detachment of both eyes; PVD (peripheral vascular disease) (H) (2009); and Type 2 diabetes mellitus, uncontrolled (H).  PAST SURGICAL HISTORY:  has a past surgical history that includes SPINE FUSION,ANTER,3 SGMTS; hysterectomy, pap no longer indicated; SLING OPERATION FOR STRESS INCONTINENCE; VEIN IN SITU BYPASS GRAFT,FEM-POP (4/9/10); Amputate leg below knee (1/30/2013); cataract iol, rt/lt (2-29-00,3-27-00); Heart Cath, Angioplasty (8/2010); Amputate leg below knee (2010); Eye surgery;  R TKA; and history of low back surgery.  FAMILY HISTORY: family history includes DIABETES in her mother; Family History Negative in her brother, brother, brother, daughter, and son; Respiratory in her father.  SOCIAL HISTORY:  reports that she quit smoking about 38 years ago. She has never used smokeless tobacco. She reports that she drinks about 1.8 oz of alcohol per week  She reports that she does not use illicit drugs.    MEDICATIONS:  Current Outpatient Prescriptions   Medication Sig Dispense Refill     polyethylene glycol 0.4%- propylene glycol 0.3% (SYSTANE ULTRA) 0.4-0.3 % SOLN ophthalmic solution Place 1 drop into both eyes 2 times daily as needed for dry eyes       Cetirizine HCl (ZYRTEC ALLERGY PO) Take 5 mg by mouth daily       FEXOFENADINE HCL PO Take 1,200 mg by mouth every 12 hours       sennosides (SENOKOT) 8.6 MG tablet Take 1 tablet by mouth daily       Nystatin POWD Apply topically 2 times daily as needed       HYDRALAZINE HCL PO Take 100 mg by mouth 3 times daily        GUAIFENESIN PO Take 20 mLs by mouth every 4 hours as needed        QUEtiapine Fumarate (SEROQUEL PO) Take 12.5 mg by mouth At Bedtime       fluticasone (FLONASE) 50 MCG/ACT spray Spray 1 spray into both nostrils 2 times daily as needed        acetaminophen (TYLENOL) 500 MG tablet Take 500 mg by mouth 3 times daily        Multiple Vitamins-Minerals (MULTIVITAMIN PO) Take 1 Tablespoonful by mouth daily (with breakfast) With iron       PREDNISONE PO Take 2.5 mg by mouth every morning       diclofenac (VOLTAREN) 1 % GEL topical gel Apply 2 g topically 4 times daily Apply to L side of low back and into posterior L thigh       Glycerin, Laxative, (GLYCERIN, ADULT,) 80.7 % SUPP Place 1 suppository rectally daily as needed (constipation)       amLODIPine (NORVASC) 10 MG tablet Take 10 mg by mouth daily at 5PM       insulin aspart (INSULIN ASPART) 100 UNIT/ML soln Inject Subcutaneous 3 times daily (with meals) Sliding scale coverage> BGT  "200 to 250> 3 units; 251 to 300> 6 units; 301 to 350> 9 units; 351 to 400> 12 units; 401 + give 15 units       LOSARTAN POTASSIUM PO Take 100 mg by mouth daily Hold for SBP <115       acetaminophen (TYLENOL) 500 MG tablet Take 500 mg by mouth every 4 hours (while awake) Max 3g/24hrs       SIMVASTATIN PO Take 10 mg by mouth At Bedtime        cholecalciferol (VITAMIN D3) 1000 UNIT tablet Take 1,000 Units by mouth daily       insulin glargine (LANTUS) 100 UNIT/ML PEN Inject 30 Units Subcutaneous every morning Raise dose by one unit if AM glucoses are over 130.       clopidogrel (PLAVIX) 75 MG tablet Take 1 tablet (75 mg) by mouth daily 90 tablet 1     donepezil (ARICEPT) 10 MG tablet Take 1 tablet (10 mg) by mouth At Bedtime 90 tablet 1     carvedilol (COREG) 12.5 MG tablet Take 1 tablet (12.5 mg) by mouth 2 times daily (with meals) 180 tablet 1     nitroglycerin (NITROSTAT) 0.4 MG SL tablet Place 1 tablet (0.4 mg) under the tongue every 5 minutes as needed for chest pain If still having symptoms after 3 doses (15 minutes) call 911. 25 tablet 0     Medications reviewed:  Medications reconciled to facility chart and changes were made to reflect current medications as identified as above med list. Below are the changes that were made:   Medications stopped since last EPIC medication reconciliation:   There are no discontinued medications.    Medications started since last [facility] medication reconciliation:  No orders of the defined types were placed in this encounter.      Case Management:  I have reviewed the care plan and MDS and do agree with the plan. Patient's desire to return to the community is not present.  Information reviewed:  Medications, vital signs, orders, and nursing notes.    ROS:  4 point ROS including Respiratory, CV, GI and , other than that noted in the HPI,  is negative> she stated that she is well today    Exam:  Vitals: /75  Pulse 64  Temp 95  F (35  C)  Resp 16  Ht 5' 1.5\" (1.562 m)  Wt " 128 lb 8 oz (58.3 kg)  SpO2 100%  BMI 23.89 kg/m2  BMI= Body mass index is 23.89 kg/(m^2).  GENERAL APPEARANCE:  Alert, in no distress, cooperative, pleasant  ENT:  Mouth and posterior oropharynx normal, moist mucous membranes, [place], does well with HAs  EYES:  EOM, conjunctivae, lids, pupils and irises normal  RESP:  respiratory effort and palpation of chest normal, lungs clear to auscultation , no respiratory distress  CV:  Palpation and auscultation of heart done , regular rate and rhythm, no murmur, rub, or gallop, no edema in her thighs  ABDOMEN:  normal bowel sounds, soft, nontender, no hepatosplenomegaly or other masses  M/S:   Gait and station abnormal > she is able to transfer with min assist with her mason prosthesis in place  SKIN:  Inspection of skin and subcutaneous tissue baseline  NEURO:   Cranial nerves 2-12 are normal tested and grossly at patient's baseline  PSYCH:  oriented to herself, insight and judgement impaired, memory impaired , affect and mood normal     BP 11/07-12/08: 147-161/58-87 mmHg    Lab/Diagnostic data:   CBC RESULTS:   Recent Labs   Lab Test  04/19/17   0640  02/22/17   0805   WBC  3.6*  4.9   RBC  2.73*  3.01*   HGB  8.3*  9.0*   HCT  26.0*  27.9*   MCV  95  93   MCH  30.4  29.9   MCHC  31.9  32.3   RDW  14.9  14.0   PLT  144*  178       Last Basic Metabolic Panel:  Recent Labs   Lab Test  04/19/17   0640  12/12/16   0535   NA  142  143   POTASSIUM  4.6  4.2   CHLORIDE  109  110*   VIDHI  8.4*  9.0   CO2  28  26   BUN  35*  32*   CR  1.66*  1.39*   GLC  73  76       Liver Function Studies -   Recent Labs   Lab Test  05/10/17   0610  06/22/16   1249   PROTTOTAL  6.7*  6.7*   ALBUMIN  3.3*  3.5   BILITOTAL  0.3  0.4   ALKPHOS  41  50   AST  17  21   ALT  18  26       Lab Results   Component Value Date    A1C 6.5 01/30/2017    A1C 7.6 09/14/2016       ASSESSMENT/PLAN  Status post bilateral below knee amputation (H)  She is supposed to get new prosthesis in Jan 2018> I did complete paper  work for that  It has been sent to [facility]    Type 2 diabetes mellitus with peripheral vascular disease (H) diabetes since 1980  Will obtain A1c Prem 3 2018> her insurance co only pays for 1 test / yr  BGTs have been in an acceptable range for her age    [place]  Medication will not be beneficial  Low dose Seroquel has eliminated the nocturnal delusions    Hypertensive renal disease, stage 1 through stage 4 or unspecified chronic kidney disease  Will need to be careful and renally adjust meds as indicated to avoid stressing the kidneys    HTN, goal below 150/90  Since she is 89 yrs old > goal of BP > 150/90 is appropriate  No change in tx at this time      Orders:  I did order labs for 1-3-2018> CBC, BMP, LFT, Lipids, A1C    Time> 30 min> review of chart    Electronically signed by:  AL Kim CNP

## 2018-01-01 ENCOUNTER — NURSING HOME VISIT (OUTPATIENT)
Dept: GERIATRICS | Facility: CLINIC | Age: 83
End: 2018-01-01
Payer: MEDICARE

## 2018-01-01 ENCOUNTER — DOCUMENTATION ONLY (OUTPATIENT)
Dept: CARDIOLOGY | Facility: CLINIC | Age: 83
End: 2018-01-01

## 2018-01-01 ENCOUNTER — ALLIED HEALTH/NURSE VISIT (OUTPATIENT)
Dept: CARDIOLOGY | Facility: CLINIC | Age: 83
End: 2018-01-01
Payer: MEDICARE

## 2018-01-01 ENCOUNTER — TELEPHONE (OUTPATIENT)
Dept: GERIATRICS | Facility: CLINIC | Age: 83
End: 2018-01-01

## 2018-01-01 ENCOUNTER — ASSISTED LIVING VISIT (OUTPATIENT)
Dept: GERIATRICS | Facility: CLINIC | Age: 83
End: 2018-01-01
Payer: MEDICARE

## 2018-01-01 ENCOUNTER — HOSPITAL LABORATORY (OUTPATIENT)
Dept: OTHER | Facility: CLINIC | Age: 83
End: 2018-01-01

## 2018-01-01 VITALS
RESPIRATION RATE: 18 BRPM | BODY MASS INDEX: 22.82 KG/M2 | TEMPERATURE: 98.5 F | OXYGEN SATURATION: 95 % | HEART RATE: 68 BPM | WEIGHT: 124 LBS | SYSTOLIC BLOOD PRESSURE: 130 MMHG | DIASTOLIC BLOOD PRESSURE: 64 MMHG | HEIGHT: 62 IN

## 2018-01-01 VITALS
RESPIRATION RATE: 16 BRPM | DIASTOLIC BLOOD PRESSURE: 65 MMHG | HEART RATE: 85 BPM | TEMPERATURE: 98.3 F | SYSTOLIC BLOOD PRESSURE: 130 MMHG | OXYGEN SATURATION: 97 %

## 2018-01-01 VITALS
OXYGEN SATURATION: 96 % | WEIGHT: 293 LBS | HEIGHT: 62 IN | DIASTOLIC BLOOD PRESSURE: 67 MMHG | BODY MASS INDEX: 53.92 KG/M2 | SYSTOLIC BLOOD PRESSURE: 155 MMHG | TEMPERATURE: 98 F | RESPIRATION RATE: 18 BRPM | HEART RATE: 64 BPM

## 2018-01-01 VITALS
HEART RATE: 71 BPM | SYSTOLIC BLOOD PRESSURE: 168 MMHG | BODY MASS INDEX: 22.67 KG/M2 | WEIGHT: 123.2 LBS | RESPIRATION RATE: 16 BRPM | TEMPERATURE: 98 F | OXYGEN SATURATION: 96 % | DIASTOLIC BLOOD PRESSURE: 66 MMHG | HEIGHT: 62 IN

## 2018-01-01 VITALS
RESPIRATION RATE: 20 BRPM | TEMPERATURE: 98.6 F | BODY MASS INDEX: 22.67 KG/M2 | WEIGHT: 123.2 LBS | OXYGEN SATURATION: 98 % | DIASTOLIC BLOOD PRESSURE: 70 MMHG | HEIGHT: 62 IN | HEART RATE: 60 BPM | SYSTOLIC BLOOD PRESSURE: 146 MMHG

## 2018-01-01 VITALS
RESPIRATION RATE: 16 BRPM | SYSTOLIC BLOOD PRESSURE: 114 MMHG | HEART RATE: 62 BPM | BODY MASS INDEX: 22.82 KG/M2 | TEMPERATURE: 97.8 F | OXYGEN SATURATION: 94 % | HEIGHT: 62 IN | DIASTOLIC BLOOD PRESSURE: 61 MMHG | WEIGHT: 124 LBS

## 2018-01-01 VITALS
BODY MASS INDEX: 23.08 KG/M2 | OXYGEN SATURATION: 95 % | WEIGHT: 125.4 LBS | SYSTOLIC BLOOD PRESSURE: 126 MMHG | DIASTOLIC BLOOD PRESSURE: 59 MMHG | TEMPERATURE: 98 F | HEIGHT: 62 IN | HEART RATE: 65 BPM | RESPIRATION RATE: 18 BRPM

## 2018-01-01 VITALS
BODY MASS INDEX: 22.67 KG/M2 | SYSTOLIC BLOOD PRESSURE: 150 MMHG | OXYGEN SATURATION: 96 % | RESPIRATION RATE: 17 BRPM | TEMPERATURE: 98.2 F | HEIGHT: 62 IN | DIASTOLIC BLOOD PRESSURE: 75 MMHG | HEART RATE: 75 BPM | WEIGHT: 123.2 LBS

## 2018-01-01 VITALS
WEIGHT: 125.6 LBS | DIASTOLIC BLOOD PRESSURE: 60 MMHG | HEIGHT: 62 IN | BODY MASS INDEX: 23.11 KG/M2 | TEMPERATURE: 97.6 F | HEART RATE: 64 BPM | SYSTOLIC BLOOD PRESSURE: 132 MMHG | OXYGEN SATURATION: 95 % | RESPIRATION RATE: 18 BRPM

## 2018-01-01 VITALS
HEART RATE: 52 BPM | OXYGEN SATURATION: 98 % | TEMPERATURE: 98.7 F | SYSTOLIC BLOOD PRESSURE: 138 MMHG | DIASTOLIC BLOOD PRESSURE: 40 MMHG | RESPIRATION RATE: 48 BRPM

## 2018-01-01 VITALS
RESPIRATION RATE: 18 BRPM | TEMPERATURE: 97.7 F | HEART RATE: 72 BPM | BODY MASS INDEX: 22.67 KG/M2 | OXYGEN SATURATION: 97 % | DIASTOLIC BLOOD PRESSURE: 68 MMHG | SYSTOLIC BLOOD PRESSURE: 137 MMHG | WEIGHT: 123.2 LBS | HEIGHT: 62 IN

## 2018-01-01 VITALS
SYSTOLIC BLOOD PRESSURE: 150 MMHG | RESPIRATION RATE: 17 BRPM | BODY MASS INDEX: 21.79 KG/M2 | DIASTOLIC BLOOD PRESSURE: 75 MMHG | TEMPERATURE: 98.2 F | OXYGEN SATURATION: 96 % | HEART RATE: 75 BPM | WEIGHT: 117.2 LBS

## 2018-01-01 VITALS
HEIGHT: 62 IN | WEIGHT: 133.2 LBS | RESPIRATION RATE: 18 BRPM | TEMPERATURE: 98.4 F | BODY MASS INDEX: 24.51 KG/M2 | OXYGEN SATURATION: 96 % | HEART RATE: 64 BPM | SYSTOLIC BLOOD PRESSURE: 128 MMHG | DIASTOLIC BLOOD PRESSURE: 62 MMHG

## 2018-01-01 VITALS
SYSTOLIC BLOOD PRESSURE: 132 MMHG | HEIGHT: 62 IN | RESPIRATION RATE: 16 BRPM | BODY MASS INDEX: 22.63 KG/M2 | OXYGEN SATURATION: 95 % | WEIGHT: 123 LBS | HEART RATE: 67 BPM | DIASTOLIC BLOOD PRESSURE: 65 MMHG | TEMPERATURE: 98.1 F

## 2018-01-01 VITALS
OXYGEN SATURATION: 96 % | TEMPERATURE: 98 F | BODY MASS INDEX: 22.67 KG/M2 | DIASTOLIC BLOOD PRESSURE: 95 MMHG | RESPIRATION RATE: 18 BRPM | WEIGHT: 123.2 LBS | SYSTOLIC BLOOD PRESSURE: 149 MMHG | HEIGHT: 62 IN | HEART RATE: 60 BPM

## 2018-01-01 VITALS
HEIGHT: 62 IN | DIASTOLIC BLOOD PRESSURE: 75 MMHG | RESPIRATION RATE: 18 BRPM | SYSTOLIC BLOOD PRESSURE: 155 MMHG | HEART RATE: 60 BPM | OXYGEN SATURATION: 95 % | WEIGHT: 123.2 LBS | BODY MASS INDEX: 22.67 KG/M2 | TEMPERATURE: 97.6 F

## 2018-01-01 DIAGNOSIS — M54.42 CHRONIC LEFT-SIDED LOW BACK PAIN WITH LEFT-SIDED SCIATICA: ICD-10-CM

## 2018-01-01 DIAGNOSIS — I12.9 HYPERTENSIVE RENAL DISEASE, STAGE 1 THROUGH STAGE 4 OR UNSPECIFIED CHRONIC KIDNEY DISEASE: ICD-10-CM

## 2018-01-01 DIAGNOSIS — E11.51 TYPE 2 DIABETES MELLITUS WITH PERIPHERAL VASCULAR DISEASE (H): ICD-10-CM

## 2018-01-01 DIAGNOSIS — F01.518 VASCULAR DEMENTIA WITH BEHAVIOR DISTURBANCE (H): ICD-10-CM

## 2018-01-01 DIAGNOSIS — F01.518 VASCULAR DEMENTIA WITH BEHAVIORAL DISTURBANCE (H): Primary | ICD-10-CM

## 2018-01-01 DIAGNOSIS — I50.32 CHRONIC DIASTOLIC CONGESTIVE HEART FAILURE (H): ICD-10-CM

## 2018-01-01 DIAGNOSIS — E11.22 TYPE 2 DIABETES MELLITUS WITH STAGE 3 CHRONIC KIDNEY DISEASE, WITH LONG-TERM CURRENT USE OF INSULIN (H): ICD-10-CM

## 2018-01-01 DIAGNOSIS — Z89.512 STATUS POST BILATERAL BELOW KNEE AMPUTATION (H): ICD-10-CM

## 2018-01-01 DIAGNOSIS — Z95.0 CARDIAC PACEMAKER IN SITU: Primary | ICD-10-CM

## 2018-01-01 DIAGNOSIS — G30.9 ALZHEIMER'S DEMENTIA WITH BEHAVIORAL DISTURBANCE, UNSPECIFIED TIMING OF DEMENTIA ONSET: ICD-10-CM

## 2018-01-01 DIAGNOSIS — R41.0 CONFUSION: Primary | ICD-10-CM

## 2018-01-01 DIAGNOSIS — G89.29 CHRONIC LEFT-SIDED LOW BACK PAIN WITH LEFT-SIDED SCIATICA: ICD-10-CM

## 2018-01-01 DIAGNOSIS — N18.30 TYPE 2 DIABETES MELLITUS WITH STAGE 3 CHRONIC KIDNEY DISEASE, WITH LONG-TERM CURRENT USE OF INSULIN (H): ICD-10-CM

## 2018-01-01 DIAGNOSIS — Z89.511 STATUS POST BILATERAL BELOW KNEE AMPUTATION (H): ICD-10-CM

## 2018-01-01 DIAGNOSIS — F01.518 VASCULAR DEMENTIA WITH BEHAVIOR DISTURBANCE (H): Primary | ICD-10-CM

## 2018-01-01 DIAGNOSIS — Z79.4 TYPE 2 DIABETES MELLITUS WITH STAGE 3 CHRONIC KIDNEY DISEASE, WITH LONG-TERM CURRENT USE OF INSULIN (H): ICD-10-CM

## 2018-01-01 DIAGNOSIS — E11.51 TYPE 2 DIABETES MELLITUS WITH PERIPHERAL VASCULAR DISEASE (H): Primary | ICD-10-CM

## 2018-01-01 DIAGNOSIS — G30.9 ALZHEIMER'S DEMENTIA WITH BEHAVIORAL DISTURBANCE, UNSPECIFIED TIMING OF DEMENTIA ONSET: Primary | ICD-10-CM

## 2018-01-01 DIAGNOSIS — F02.81 ALZHEIMER'S DEMENTIA WITH BEHAVIORAL DISTURBANCE, UNSPECIFIED TIMING OF DEMENTIA ONSET: ICD-10-CM

## 2018-01-01 DIAGNOSIS — Z95.5 HISTORY OF HEART ARTERY STENT: ICD-10-CM

## 2018-01-01 DIAGNOSIS — Z79.4 TYPE 2 DIABETES MELLITUS WITH COMPLICATION, WITH LONG-TERM CURRENT USE OF INSULIN (H): ICD-10-CM

## 2018-01-01 DIAGNOSIS — F22 PARANOIA (H): ICD-10-CM

## 2018-01-01 DIAGNOSIS — I10 BENIGN ESSENTIAL HYPERTENSION: ICD-10-CM

## 2018-01-01 DIAGNOSIS — I25.10 ASCVD (ARTERIOSCLEROTIC CARDIOVASCULAR DISEASE): ICD-10-CM

## 2018-01-01 DIAGNOSIS — F02.81 ALZHEIMER'S DEMENTIA WITH BEHAVIORAL DISTURBANCE, UNSPECIFIED TIMING OF DEMENTIA ONSET: Primary | ICD-10-CM

## 2018-01-01 DIAGNOSIS — R41.82 ALTERED MENTAL STATUS, UNSPECIFIED ALTERED MENTAL STATUS TYPE: Primary | ICD-10-CM

## 2018-01-01 DIAGNOSIS — F03.918 SENILE DEMENTIA, WITH BEHAVIORAL DISTURBANCE (H): ICD-10-CM

## 2018-01-01 DIAGNOSIS — Z71.89 ADVANCED DIRECTIVES, COUNSELING/DISCUSSION: ICD-10-CM

## 2018-01-01 DIAGNOSIS — F22 PARANOIA (H): Primary | ICD-10-CM

## 2018-01-01 DIAGNOSIS — E11.8 TYPE 2 DIABETES MELLITUS WITH COMPLICATION, WITH LONG-TERM CURRENT USE OF INSULIN (H): ICD-10-CM

## 2018-01-01 DIAGNOSIS — R53.81 DECLINING FUNCTIONAL STATUS: Primary | ICD-10-CM

## 2018-01-01 DIAGNOSIS — R46.89 WANDERING: ICD-10-CM

## 2018-01-01 LAB
ALBUMIN SERPL-MCNC: 3.8 G/DL (ref 3.4–5)
ALBUMIN UR-MCNC: 30 MG/DL
ALP SERPL-CCNC: 46 U/L (ref 40–150)
ALT SERPL W P-5'-P-CCNC: 22 U/L (ref 0–50)
AMORPH CRY #/AREA URNS HPF: ABNORMAL /HPF
ANION GAP SERPL CALCULATED.3IONS-SCNC: 5 MMOL/L (ref 3–14)
ANION GAP SERPL CALCULATED.3IONS-SCNC: 8 MMOL/L (ref 3–14)
APPEARANCE UR: ABNORMAL
AST SERPL W P-5'-P-CCNC: 20 U/L (ref 0–45)
BACTERIA #/AREA URNS HPF: ABNORMAL /HPF
BACTERIA SPEC CULT: ABNORMAL
BACTERIA SPEC CULT: ABNORMAL
BILIRUB DIRECT SERPL-MCNC: <0.1 MG/DL (ref 0–0.2)
BILIRUB SERPL-MCNC: 0.4 MG/DL (ref 0.2–1.3)
BILIRUB UR QL STRIP: NEGATIVE
BUN SERPL-MCNC: 37 MG/DL (ref 7–30)
BUN SERPL-MCNC: 55 MG/DL (ref 7–30)
CALCIUM SERPL-MCNC: 8.8 MG/DL (ref 8.5–10.1)
CALCIUM SERPL-MCNC: 9.1 MG/DL (ref 8.5–10.1)
CHLORIDE SERPL-SCNC: 109 MMOL/L (ref 94–109)
CHLORIDE SERPL-SCNC: 109 MMOL/L (ref 94–109)
CHOLEST SERPL-MCNC: 172 MG/DL
CO2 SERPL-SCNC: 24 MMOL/L (ref 20–32)
CO2 SERPL-SCNC: 27 MMOL/L (ref 20–32)
COLOR UR AUTO: ABNORMAL
CREAT SERPL-MCNC: 1.57 MG/DL (ref 0.52–1.04)
CREAT SERPL-MCNC: 1.59 MG/DL (ref 0.52–1.04)
ERYTHROCYTE [DISTWIDTH] IN BLOOD BY AUTOMATED COUNT: 12.3 % (ref 10–15)
ERYTHROCYTE [DISTWIDTH] IN BLOOD BY AUTOMATED COUNT: 13.9 % (ref 10–15)
GFR SERPL CREATININE-BSD FRML MDRD: 30 ML/MIN/1.7M2
GFR SERPL CREATININE-BSD FRML MDRD: 31 ML/MIN/1.7M2
GLUCOSE SERPL-MCNC: 154 MG/DL (ref 70–99)
GLUCOSE SERPL-MCNC: 99 MG/DL (ref 70–99)
GLUCOSE UR STRIP-MCNC: NEGATIVE MG/DL
HBA1C MFR BLD: 7.8 % (ref 4.3–6)
HCT VFR BLD AUTO: 28.5 % (ref 35–47)
HCT VFR BLD AUTO: 29.5 % (ref 35–47)
HDLC SERPL-MCNC: 62 MG/DL
HGB BLD-MCNC: 9.3 G/DL (ref 11.7–15.7)
HGB BLD-MCNC: 9.8 G/DL (ref 11.7–15.7)
HGB UR QL STRIP: ABNORMAL
KETONES UR STRIP-MCNC: NEGATIVE MG/DL
LDLC SERPL CALC-MCNC: 76 MG/DL
LEUKOCYTE ESTERASE UR QL STRIP: ABNORMAL
Lab: ABNORMAL
MCH RBC QN AUTO: 30.8 PG (ref 26.5–33)
MCH RBC QN AUTO: 31.4 PG (ref 26.5–33)
MCHC RBC AUTO-ENTMCNC: 32.6 G/DL (ref 31.5–36.5)
MCHC RBC AUTO-ENTMCNC: 33.2 G/DL (ref 31.5–36.5)
MCV RBC AUTO: 93 FL (ref 78–100)
MCV RBC AUTO: 96 FL (ref 78–100)
NITRATE UR QL: NEGATIVE
NONHDLC SERPL-MCNC: 110 MG/DL
PH UR STRIP: 7.5 PH (ref 5–7)
PLATELET # BLD AUTO: 168 10E9/L (ref 150–450)
PLATELET # BLD AUTO: 202 10E9/L (ref 150–450)
POTASSIUM SERPL-SCNC: 4.7 MMOL/L (ref 3.4–5.3)
POTASSIUM SERPL-SCNC: 4.8 MMOL/L (ref 3.4–5.3)
PROT SERPL-MCNC: 7.4 G/DL (ref 6.8–8.8)
RBC # BLD AUTO: 2.96 10E12/L (ref 3.8–5.2)
RBC # BLD AUTO: 3.18 10E12/L (ref 3.8–5.2)
RBC #/AREA URNS AUTO: 0 /HPF (ref 0–2)
SODIUM SERPL-SCNC: 141 MMOL/L (ref 133–144)
SODIUM SERPL-SCNC: 141 MMOL/L (ref 133–144)
SOURCE: ABNORMAL
SP GR UR STRIP: 1.01 (ref 1–1.03)
SPECIMEN SOURCE: ABNORMAL
SQUAMOUS #/AREA URNS AUTO: 4 /HPF (ref 0–1)
TRIGL SERPL-MCNC: 171 MG/DL
TSH SERPL DL<=0.005 MIU/L-ACNC: 1.71 MU/L (ref 0.4–4)
UROBILINOGEN UR STRIP-MCNC: NORMAL MG/DL (ref 0–2)
WBC # BLD AUTO: 4.9 10E9/L (ref 4–11)
WBC # BLD AUTO: 5.3 10E9/L (ref 4–11)
WBC #/AREA URNS AUTO: 5 /HPF (ref 0–5)

## 2018-01-01 PROCEDURE — 99309 SBSQ NF CARE MODERATE MDM 30: CPT | Performed by: NURSE PRACTITIONER

## 2018-01-01 PROCEDURE — 99310 SBSQ NF CARE HIGH MDM 45: CPT | Performed by: NURSE PRACTITIONER

## 2018-01-01 PROCEDURE — 99309 SBSQ NF CARE MODERATE MDM 30: CPT | Performed by: INTERNAL MEDICINE

## 2018-01-01 PROCEDURE — 99308 SBSQ NF CARE LOW MDM 20: CPT | Performed by: NURSE PRACTITIONER

## 2018-01-01 PROCEDURE — 93293 PM PHONE R-STRIP DEVICE EVAL: CPT | Performed by: INTERNAL MEDICINE

## 2018-01-01 PROCEDURE — 99316 NF DSCHRG MGMT 30 MIN+: CPT | Performed by: NURSE PRACTITIONER

## 2018-01-01 RX ORDER — ATROPINE SULFATE 10 MG/ML
2 SOLUTION/ DROPS OPHTHALMIC
COMMUNITY

## 2018-01-01 RX ORDER — SULFAMETHOXAZOLE/TRIMETHOPRIM 800-160 MG
1 TABLET ORAL 2 TIMES DAILY
COMMUNITY

## 2018-01-01 RX ORDER — BISACODYL 10 MG
10 SUPPOSITORY, RECTAL RECTAL DAILY PRN
COMMUNITY

## 2018-02-09 NOTE — PROGRESS NOTES
"[place]  Nursing Home Regulatory Visit  February 9, 2018    Chief Complaint   Patient presents with     assisted Regulatory       HPI:    Cinthia Whittington is a 89 year old  (11/5/1924), who is being seen today for a federally mandated E/M visit at [facility]. Today's concerns are:    1) Vascular Dementia With Behavioral Disturbance -- Also with paranoia. Today, tells me \"I am very sad\", but cannot be specific. Offered to take her over to see the  children or to an activity and she replied, \"I\"m glad they are happy\". Oriented to self. Continues on donepezil 10 mg qhs and quetiapine 12.5 mg qhs, Requires 24/7 cares.   2) HTN / Diastolic CHF-- Difficult to assess volume status given bilateral  BKA, but overall appears euvolemic. Managed with amlodipine 10 mg at 1700, carvedilol 12.5 mg BID, hydralazine 100 mg TID and losartan 100 mg daily.   3) DM, Type II -- Hgb A1c 6.5 a year ago. More recent blood sugars labile in 170s-300s on glargine 30 units qam and aspart SSI TID AC    ALLERGIES: Lisinopril and Remeron [mirtazapine]    PAST MEDICAL HISTORY:   Past Medical History:   Diagnosis Date     Anemia of chronic disease      ASCVD (arteriosclerotic cardiovascular disease) 10/15/2010    8/2010 heart cath with 3 vessel disease and bare metal stent to RCA.      Blood loss anemia 2/5/2013     Carotid artery disease (H)      Dementia      Diabetes mellitus (H)      Diabetic macular edema (H)     Bilateral. OS-focal sofía treament. OD- observation as of 3/18/15.     Diabetic neuropathy (H)      GERD (gastroesophageal reflux disease)      Hearing loss      HTN      Hypercholesteremia      Lymphedema      Multiple thyroid nodules      Posterior vitreous detachment of both eyes      PVD (peripheral vascular disease) (H) 2009    bilateral     Type 2 diabetes mellitus, uncontrolled (H)        PAST SURGICAL HISTORY:   Past Surgical History:   Procedure Laterality Date " "    AMPUTATE LEG BELOW KNEE  2013    Procedure: AMPUTATE LEG BELOW KNEE;  LEFT BELOW KNEE AMPUTATION;  Surgeon: Les Hope MD;  Location: [facility] OR     AMPUTATE LEG BELOW KNEE      right side by kaelyn PEREYRA SPINE FUSION,ANTER,3 SGMTS      posterior, not ant      C VEIN IN SITU BYPASS GRAFT,FEM-POP  4/9/10    right     CATARACT IOL, RT/LT  00,3-27-00     EYE SURGERY      Focal laser x 4     HC SLING OPERATION FOR STRESS INCONTINENCE       HEART CATH, ANGIOPLASTY  2010    3 vessel disease with bare metal stent to RCA     history of low back surgery       HYSTERECTOMY, PAP NO LONGER INDICATED       R TKA         FAMILY HISTORY:   Family History   Problem Relation Age of Onset     DIABETES Mother       67yo and HTN     Respiratory Father       79yo \"consumption\"  (smoker)     Family History Negative Brother       49yo lung work related exposure     Family History Negative Brother       56yo      Family History Negative Brother       41yo smoker and drinker     Family History Negative Daughter      Family History Negative Son        SOCIAL HISTORY:   Lives in a SNF    MEDICATIONS:  Current Outpatient Prescriptions   Medication Sig Dispense Refill     polyethylene glycol 0.4%- propylene glycol 0.3% (SYSTANE ULTRA) 0.4-0.3 % SOLN ophthalmic solution Place 1 drop into both eyes 2 times daily as needed for dry eyes       Cetirizine HCl (ZYRTEC ALLERGY PO) Take 5 mg by mouth daily       FEXOFENADINE HCL PO Take 1,200 mg by mouth every 12 hours       sennosides (SENOKOT) 8.6 MG tablet Take 1 tablet by mouth daily       Nystatin POWD Apply topically 2 times daily as needed       HYDRALAZINE HCL PO Take 100 mg by mouth 3 times daily        GUAIFENESIN PO Take 20 mLs by mouth every 4 hours as needed        QUEtiapine Fumarate (SEROQUEL PO) Take 12.5 mg by mouth At Bedtime       fluticasone (FLONASE) 50 MCG/ACT spray Spray 1 spray into both nostrils 2 " times daily as needed        acetaminophen (TYLENOL) 500 MG tablet Take 500 mg by mouth 3 times daily        Multiple Vitamins-Minerals (MULTIVITAMIN PO) Take 1 Tablespoonful by mouth daily (with breakfast) With iron       PREDNISONE PO Take 2.5 mg by mouth every morning       diclofenac (VOLTAREN) 1 % GEL topical gel Apply 2 g topically 4 times daily Apply to L side of low back and into posterior L thigh       Glycerin, Laxative, (GLYCERIN, ADULT,) 80.7 % SUPP Place 1 suppository rectally daily as needed (constipation)       amLODIPine (NORVASC) 10 MG tablet Take 10 mg by mouth daily at 5PM       insulin aspart (INSULIN ASPART) 100 UNIT/ML soln Inject Subcutaneous 3 times daily (with meals) Sliding scale coverage>  to 250> 3 units; 251 to 300> 6 units; 301 to 350> 9 units; 351 to 400> 12 units; 401 + give 15 units       LOSARTAN POTASSIUM PO Take 100 mg by mouth daily Hold for SBP <115       acetaminophen (TYLENOL) 500 MG tablet Take 500 mg by mouth every 4 hours (while awake) Max 3g/24hrs       SIMVASTATIN PO Take 10 mg by mouth At Bedtime        cholecalciferol (VITAMIN D3) 1000 UNIT tablet Take 1,000 Units by mouth daily       insulin glargine (LANTUS) 100 UNIT/ML PEN Inject 30 Units Subcutaneous every morning Raise dose by one unit if AM glucoses are over 130.       clopidogrel (PLAVIX) 75 MG tablet Take 1 tablet (75 mg) by mouth daily 90 tablet 1     donepezil (ARICEPT) 10 MG tablet Take 1 tablet (10 mg) by mouth At Bedtime 90 tablet 1     carvedilol (COREG) 12.5 MG tablet Take 1 tablet (12.5 mg) by mouth 2 times daily (with meals) 180 tablet 1     nitroglycerin (NITROSTAT) 0.4 MG SL tablet Place 1 tablet (0.4 mg) under the tongue every 5 minutes as needed for chest pain If still having symptoms after 3 doses (15 minutes) call 911. 25 tablet 0       Medications reviewed:  Medications reconciled to facility chart and changes were made to reflect current medications as identified as above med list. Below  "are the changes that were made:   Medications stopped since last EPIC medication reconciliation:   There are no discontinued medications.  Medications started since last [facility] medication reconciliation:  No orders of the defined types were placed in this encounter.    Case Management:  I have reviewed the care plan and MDS and do agree with the plan.   Information reviewed:  Medications, vital signs, orders, and nursing notes.    ROS:  Unable to be obtained due to cognitive impairment or aphasia.     PHYSICAL EXAM:  /65  Pulse 67  Temp 98.1  F (36.7  C)  Resp 16  Ht 5' 1.5\" (1.562 m)  Wt 123 lb (55.8 kg)  SpO2 95%  BMI 22.86 kg/m2  Gen: sitting in wheelchair, alert, cooperative and in no acute distress  Resp: breathing non-labored  GI: abdomen soft, not-tender  Ext: s/p bilateral BKA with prostheses in place  Neuro: CX II-XII grossly in tact; ROM in upper extremities grossly in tact  Psych: memory, judgement and insight impaired.       Lab/Diagnostic data:  Reviewed as per Epic    ASSESSMENT/PLAN    1) Vascular Dementia With Behavioral Disturbance   Today, tells me \"I am very sad\", but cannot be specific. Offered to take her over to see the  children or to an activity and she replied, \"I\"m glad they are happy\". Has paranoia. Oriented to self.   -- continues on donepezil 10 mg qhs and quetiapine 12.5 mg qhs  -- ongoing 24/7 nursing and supportive cares    2) HTN / Diastolic CHF  Difficult to assess volume status given bilateral  BKA, but overall appears euvolemic.   -- continues on amlodipine 10 mg at 1700, carvedilol 12.5 mg BID, hydralazine 100 mg TID and losartan 100 mg daily  -- follow BPs and adjust medications as needed    3) DM, Type II   Hgb A1c 6.5 a year ago. More recent blood sugars labile in 170s-300s   -- continues on glargine 30 units qam and aspart SSI TID AC  -- follow sugars and A1c and adjust insulin as needed        Electronically signed by:  Isabella Thomas MD    "

## 2018-02-27 NOTE — PROGRESS NOTES
"[facility]    Chief Complaint   Patient presents with     Dementia     Diabetes       HPI:    Cinthia Whittington is a 89 year old  (11/5/1924), who is being seen today for an episodic care visit at [facility].  HPI information obtained from: facility chart records.Today's concern is:     Vascular dementia with behavior disturbance  Type 2 diabetes mellitus with complication, with long-term current use of insulin (H)  ASCVD (arteriosclerotic cardiovascular disease)     _ patient with dementia and increasing memory impairment and paranoia. This is not new but has been getting worse of late. Has been declining Lantus insulin and combative at times when approached stating \"they stab it in to my bone.\" Stuck foot out and tripped a nurse last week. Limited oral antidiabetic option 2/2 heart disease, current meds and CKD. Patient today is alert, calm, NAD. Up in w/c. Denies pain. Denies SOB, chest pain.     ALLERGIES: Lisinopril and Remeron [mirtazapine]  Past Medical, Surgical, Family and Social History reviewed and updated in [facility].    Current Outpatient Prescriptions   Medication Sig Dispense Refill     polyethylene glycol 0.4%- propylene glycol 0.3% (SYSTANE ULTRA) 0.4-0.3 % SOLN ophthalmic solution Place 1 drop into both eyes 2 times daily as needed for dry eyes       Cetirizine HCl (ZYRTEC ALLERGY PO) Take 5 mg by mouth daily       FEXOFENADINE HCL PO Take 1,200 mg by mouth every 12 hours       Nystatin POWD Apply topically 2 times daily as needed       HYDRALAZINE HCL PO Take 100 mg by mouth 3 times daily        GUAIFENESIN PO Take 20 mLs by mouth every 4 hours as needed        QUEtiapine Fumarate (SEROQUEL PO) Take 12.5 mg by mouth At Bedtime       fluticasone (FLONASE) 50 MCG/ACT spray Spray 1 spray into both nostrils 2 times daily        acetaminophen (TYLENOL) 500 MG tablet Take 500 mg by mouth 3 times daily        Multiple Vitamins-Minerals (MULTIVITAMIN PO) Take 1 " Tablespoonful by mouth daily (with breakfast) With iron       PREDNISONE PO Take 2.5 mg by mouth every morning       diclofenac (VOLTAREN) 1 % GEL topical gel Apply 2 g topically 4 times daily Apply to L side of low back and into posterior L thigh       Glycerin, Laxative, (GLYCERIN, ADULT,) 80.7 % SUPP Place 1 suppository rectally daily as needed (constipation)       amLODIPine (NORVASC) 10 MG tablet Take 10 mg by mouth daily at 5PM       LOSARTAN POTASSIUM PO Take 100 mg by mouth daily Hold for SBP <115       acetaminophen (TYLENOL) 500 MG tablet Take 500 mg by mouth every 4 hours as needed for mild pain (max 3 g / 24 hrs) Max 3g/24hrs       SIMVASTATIN PO Take 10 mg by mouth At Bedtime        cholecalciferol (VITAMIN D3) 1000 UNIT tablet Take 1,000 Units by mouth daily       insulin glargine (LANTUS) 100 UNIT/ML PEN Inject 30 Units Subcutaneous every morning Raise dose by one unit if AM glucoses are over 130.       clopidogrel (PLAVIX) 75 MG tablet Take 1 tablet (75 mg) by mouth daily 90 tablet 1     donepezil (ARICEPT) 10 MG tablet Take 1 tablet (10 mg) by mouth At Bedtime 90 tablet 1     carvedilol (COREG) 12.5 MG tablet Take 1 tablet (12.5 mg) by mouth 2 times daily (with meals) 180 tablet 1     nitroglycerin (NITROSTAT) 0.4 MG SL tablet Place 1 tablet (0.4 mg) under the tongue every 5 minutes as needed for chest pain If still having symptoms after 3 doses (15 minutes) call 911. 25 tablet 0     Medications reviewed:  Medications reconciled to facility chart and changes were made to reflect current medications as identified as above med list. Below are the changes that were made:   Medications stopped since last EPIC medication reconciliation:   Medications Discontinued During This Encounter   Medication Reason     insulin aspart (INSULIN ASPART) 100 UNIT/ML [facility] Medication Reconciliation Clean Up       Medications started since last [facility] medication reconciliation:  No orders of the defined types were placed in  "this encounter.        REVIEW OF SYSTEMS:  Limited secondary to cognitive impairment but today pt reports as above     Physical Exam:  /62  Pulse 64  Temp 98.4  F (36.9  C)  Resp 18  Ht 5' 1.5\" (1.562 m)  Wt 133 lb 3.2 oz (60.4 kg)  SpO2 96%  BMI 24.76 kg/m2    General- in w/c, very well put together, hair, makeup, jewelery. Son here  Resp: Effort WNL, LSCTA   CV: VSS as above  Abd- soft, nontender, BS +  Musc- NELSON- w/c  Psych- alert, calm, pleasant      BP 01/30-02/20: 128-152/36-65 mmHg    Recent Labs:     CBC RESULTS:   Recent Labs   Lab Test  01/05/18   0745  04/19/17   0640   WBC  5.3  3.6*   RBC  2.96*  2.73*   HGB  9.3*  8.3*   HCT  28.5*  26.0*   MCV  96  95   MCH  31.4  30.4   MCHC  32.6  31.9   RDW  13.9  14.9   PLT  168  144*       Last Basic Metabolic Panel:  Recent Labs   Lab Test  01/05/18   0745  04/19/17   0640   NA  141  142   POTASSIUM  4.7  4.6   CHLORIDE  109  109   VIDHI  8.8  8.4*   CO2  27  28   BUN  37*  35*   CR  1.57*  1.66*   GLC  99  73       Liver Function Studies -   Recent Labs   Lab Test  01/05/18   0745  05/10/17   0610   PROTTOTAL  7.4  6.7*   ALBUMIN  3.8  3.3*   BILITOTAL  0.4  0.3   ALKPHOS  46  41   AST  20  17   ALT  22  18     Lab Results   Component Value Date    A1C 7.8 01/05/2018    A1C 6.5 01/30/2017         Assessment/Plan:    Vascular dementia with behavior disturbance  - with increasing paranoia- and declining of meds/cares at times. Recently tripped a nurse. Does not appear acutely ill, afebrile  - continues on Aricept and Seroquel and with supportive cares and tx    Type 2 diabetes mellitus with complication, with long-term current use of insulin (H)  *- h/o bilateral BKA- has new prosthesis  - limited oral choices. Did message MD regarding options.   - will d/c Lantus and trial low dose Amaryl, watch BGL closely and encourage patient to eat lunch or at least snack  - BGL ac and HS until appreciate pattern on new med    ASCVD (arteriosclerotic " cardiovascular disease)  - h/o angioplasty in 2010- on Plavix    Chronic diastolic congestive heart failure (H)  - weight stable  - continue current meds and tx              Electronically signed by  AL Rose CNP

## 2018-03-01 NOTE — PROGRESS NOTES
"[facility]  [place] [facility]    Chief Complaint   Patient presents with     Hypoglycemia       HPI:    Cinthia Whittington is a 89 year old  (11/5/1924), who is being seen today for an episodic care visit at [facility].  HPI information obtained from: facility chart records.Today's concern is:     Vascular dementia with behavioral disturbance  Type 2 diabetes mellitus with stage 3 chronic kidney disease, with long-term current use of insulin (H)  ASCVD (arteriosclerotic cardiovascular disease)  Chronic diastolic congestive heart failure (H)  Advanced directives, counseling/discussion     _ patient with dementia and increasing memory impairment and paranoia. This is not new but has been getting worse of late. Has been declining Lantus insulin and combative at times when approached stating \"they stab it in to my bone.\" Stuck foot out and tripped a nurse last week. Limited oral antidiabetic option 2/2 heart disease, current meds and CKD. Trialed low dose Amaryl after consulting with MD, but patient became hypolycemic with BGL 42 yesterday and initially declined to eat lunch or take juice or snack. Often does not eat lunch. Extensive discussion with patient [facility] - son Ayan today regarding goals of care and options for tx diabetes in above setting.     Patient today is alert, calm, NAD. Up in w/c. Denies pain. Denies SOB, chest pain.     ALLERGIES: Lisinopril and Remeron [mirtazapine]  Past Medical, Surgical, Family and Social History reviewed and updated in [facility].    Current Outpatient Prescriptions   Medication Sig Dispense Refill     polyethylene glycol 0.4%- propylene glycol 0.3% (SYSTANE ULTRA) 0.4-0.3 % SOLN ophthalmic solution Place 1 drop into both eyes 2 times daily as needed for dry eyes       Nystatin POWD Apply topically 2 times daily as needed       HYDRALAZINE HCL PO Take 100 mg by mouth 3 times daily        GUAIFENESIN PO Take 20 mLs by mouth every 4 hours as needed        " QUEtiapine Fumarate (SEROQUEL PO) Take 12.5 mg by mouth 2 times daily        fluticasone (FLONASE) 50 MCG/ACT spray Spray 1 spray into both nostrils 2 times daily        acetaminophen (TYLENOL) 500 MG tablet Take 500 mg by mouth 3 times daily        Multiple Vitamins-Minerals (MULTIVITAMIN PO) Take 1 Tablespoonful by mouth daily (with breakfast) With iron       PREDNISONE PO Take 2.5 mg by mouth every morning       diclofenac (VOLTAREN) 1 % GEL topical gel Apply 2 g topically 4 times daily Apply to L side of low back and into posterior L thigh       Glycerin, Laxative, (GLYCERIN, ADULT,) 80.7 % SUPP Place 1 suppository rectally daily as needed (constipation)       amLODIPine (NORVASC) 10 MG tablet Take 10 mg by mouth daily at 5PM       LOSARTAN POTASSIUM PO Take 100 mg by mouth daily Hold for SBP <115       acetaminophen (TYLENOL) 500 MG tablet Take 500 mg by mouth every 4 hours as needed for mild pain (max 3 g / 24 hrs) Max 3g/24hrs       SIMVASTATIN PO Take 10 mg by mouth At Bedtime        cholecalciferol (VITAMIN D3) 1000 UNIT tablet Take 1,000 Units by mouth daily       insulin glargine (LANTUS) 100 UNIT/ML PEN Inject 25 Units Subcutaneous every morning Raise dose by one unit if AM glucoses are over 130.        clopidogrel (PLAVIX) 75 MG tablet Take 1 tablet (75 mg) by mouth daily 90 tablet 1     donepezil (ARICEPT) 10 MG tablet Take 1 tablet (10 mg) by mouth At Bedtime 90 tablet 1     carvedilol (COREG) 12.5 MG tablet Take 1 tablet (12.5 mg) by mouth 2 times daily (with meals) 180 tablet 1     nitroglycerin (NITROSTAT) 0.4 MG SL tablet Place 1 tablet (0.4 mg) under the tongue every 5 minutes as needed for chest pain If still having symptoms after 3 doses (15 minutes) call 911. 25 tablet 0     Medications reviewed:  Medications reconciled to facility chart and changes were made to reflect current medications as identified as above med list. Below are the changes that were made:   Medications stopped since last  "EPIC medication reconciliation:   Medications Discontinued During This Encounter   Medication Reason     insulin aspart (INSULIN ASPART) 100 UNIT/ML [facility] Medication Reconciliation Clean Up       Medications started since last [facility] medication reconciliation:  No orders of the defined types were placed in this encounter.        REVIEW OF SYSTEMS:  Limited secondary to cognitive impairment but today pt reports as above    Physical Exam:  /64  Pulse 68  Temp 98.5  F (36.9  C)  Resp 18  Ht 5' 1.5\" (1.562 m)  Wt 124 lb (56.2 kg)  SpO2 95%  BMI 23.05 kg/m2    Resp: Effort WNL, LSCTA   CV: VSS as above  Abd- soft, nontender, BS +  Musc- NELSON- w/c  Psych- alert, calm, pleasant      BP 01/30-02/20: 128-152/36-65 mmHg    Recent Labs:     CBC RESULTS:   Recent Labs   Lab Test  01/05/18   0745  04/19/17   0640   WBC  5.3  3.6*   RBC  2.96*  2.73*   HGB  9.3*  8.3*   HCT  28.5*  26.0*   MCV  96  95   MCH  31.4  30.4   MCHC  32.6  31.9   RDW  13.9  14.9   PLT  168  144*       Last Basic Metabolic Panel:  Recent Labs   Lab Test  01/05/18   0745  04/19/17   0640   NA  141  142   POTASSIUM  4.7  4.6   CHLORIDE  109  109   VIDHI  8.8  8.4*   CO2  27  28   BUN  37*  35*   CR  1.57*  1.66*   GLC  99  73       Liver Function Studies -   Recent Labs   Lab Test  01/05/18   0745  05/10/17   0610   PROTTOTAL  7.4  6.7*   ALBUMIN  3.8  3.3*   BILITOTAL  0.4  0.3   ALKPHOS  46  41   AST  20  17   ALT  22  18     Lab Results   Component Value Date    A1C 7.8 01/05/2018    A1C 6.5 01/30/2017         Assessment/Plan:    Vascular dementia with behavior disturbance  - with increasing paranoia  - will increase Seroquel to 12.5 mg BID at 06 in a.m. And 8 pm     Type 2 diabetes mellitus with complication, with long-term current use of insulin (H)  *- h/o bilateral BKA- has new prosthesis  - d/c Amaryl 2/2 inconsistent eating habit, often declines lunch and hypolgycemic episode  - go to Lantus 25 unit daily in a.m. At 08 and after Seroquel - " reapproach at least one time to administer.   - Change BGL to once daily at alternating times    ASCVD (arteriosclerotic cardiovascular disease)  - h/o angioplasty in 2010- on Plavix    Chronic diastolic congestive heart failure (H)  - weight stable    Advanced directives, counseling/discussion  - extensive discussion with son Ayan. Decision made to change POLST to Comfort care goals and attempt to avoid hospitalization. Remains DNR/DNI        Total time spent with patient visit at the skilled nursing facility was 45 min including patient visit, review of past records and phone call to patient contact. Greater than 50% of total time spent with counseling and coordinating care due to above    Electronically signed by  AL Rose CNP

## 2018-03-08 NOTE — PROGRESS NOTES
"[facility]    Chief Complaint   Patient presents with     Dementia       HPI:    Cinthia Whittington is a 89 year old  (11/5/1924), who is being seen today for an episodic care visit at [facility].  HPI information obtained from: facility chart records.Today's concern is:     Vascular dementia with behavioral disturbance  Paranoia (H)  Type 2 diabetes mellitus with peripheral vascular disease (H)     Paranoia and aggression persists- recently witnessed tripping nurse. Per nursing declining 0600 a.m.Seroquel and continues to intermittently decline insulin. Pushed needle away and toward others. Son believes patient seems \"better\" off antihistamine and mucinex. Does not eat regularly and failed Amaryl trial with profound hypoglycemia as result.Other oral agents not approp 2/2 CKD and CAD/Plavix use.      ALLERGIES: Lisinopril and Remeron [mirtazapine]  Past Medical, Surgical, Family and Social History reviewed and updated in [employer].    Current Outpatient Prescriptions   Medication Sig Dispense Refill     polyethylene glycol 0.4%- propylene glycol 0.3% (SYSTANE ULTRA) 0.4-0.3 % SOLN ophthalmic solution Place 1 drop into both eyes 2 times daily as needed for dry eyes       Nystatin POWD Apply topically 2 times daily as needed       HYDRALAZINE HCL PO Take 100 mg by mouth 3 times daily        GUAIFENESIN PO Take 20 mLs by mouth every 4 hours as needed        QUEtiapine Fumarate (SEROQUEL PO) Take 12.5 mg by mouth 2 times daily        fluticasone (FLONASE) 50 MCG/ACT spray Spray 1 spray into both nostrils 2 times daily        acetaminophen (TYLENOL) 500 MG tablet Take 500 mg by mouth 3 times daily        Multiple Vitamins-Minerals (MULTIVITAMIN PO) Take 1 Tablespoonful by mouth daily (with breakfast) With iron       PREDNISONE PO Take 2.5 mg by mouth every morning       diclofenac (VOLTAREN) 1 % GEL topical gel Apply 2 g topically 4 times daily Apply to L side of low back and " "into posterior L thigh       Glycerin, Laxative, (GLYCERIN, ADULT,) 80.7 % SUPP Place 1 suppository rectally daily as needed (constipation)       amLODIPine (NORVASC) 10 MG tablet Take 10 mg by mouth daily at 5PM       LOSARTAN POTASSIUM PO Take 100 mg by mouth daily Hold for SBP <115       acetaminophen (TYLENOL) 500 MG tablet Take 500 mg by mouth every 4 hours as needed for mild pain (max 3 g / 24 hrs) Max 3g/24hrs       SIMVASTATIN PO Take 10 mg by mouth At Bedtime        cholecalciferol (VITAMIN D3) 1000 UNIT tablet Take 1,000 Units by mouth daily       insulin glargine (LANTUS) 100 UNIT/ML PEN Inject 25 Units Subcutaneous every morning Raise dose by one unit if AM glucoses are over 130.        clopidogrel (PLAVIX) 75 MG tablet Take 1 tablet (75 mg) by mouth daily 90 tablet 1     donepezil (ARICEPT) 10 MG tablet Take 1 tablet (10 mg) by mouth At Bedtime 90 tablet 1     carvedilol (COREG) 12.5 MG tablet Take 1 tablet (12.5 mg) by mouth 2 times daily (with meals) 180 tablet 1     nitroglycerin (NITROSTAT) 0.4 MG SL tablet Place 1 tablet (0.4 mg) under the tongue every 5 minutes as needed for chest pain If still having symptoms after 3 doses (15 minutes) call 911. 25 tablet 0     Medications reviewed:  Medications reconciled to facility chart and changes were made to reflect current medications as identified as above med list. Below are the changes that were made:   Medications stopped since last EPIC medication reconciliation:   There are no discontinued medications.    Medications started since last [facility] medication reconciliation:  No orders of the defined types were placed in this encounter.        REVIEW OF SYSTEMS:  4 point ROS including Respiratory, CV, GI and , other than that noted in the HPI,  is negative    Physical Exam:  /61  Pulse 62  Temp 97.8  F (36.6  C)  Resp 16  Ht 5' 1.5\" (1.562 m)  Wt 124 lb (56.2 kg)  SpO2 94%  BMI 23.05 kg/m2    Resp: Effort WNL  CV: VSS  Abd- soft, nontender, " BS +  [facility]- w/c  Psych- alert, calm, pleasant currently       BP 01/16-03/06: 114-152/36-65 mmHg    Recent Labs:     CBC RESULTS:   Recent Labs   Lab Test  01/05/18   0745  04/19/17   0640   WBC  5.3  3.6*   RBC  2.96*  2.73*   HGB  9.3*  8.3*   HCT  28.5*  26.0*   MCV  96  95   MCH  31.4  30.4   MCHC  32.6  31.9   RDW  13.9  14.9   PLT  168  144*       Last Basic Metabolic Panel:  Recent Labs   Lab Test  01/05/18   0745  04/19/17   0640   NA  141  142   POTASSIUM  4.7  4.6   CHLORIDE  109  109   VIDHI  8.8  8.4*   CO2  27  28   BUN  37*  35*   CR  1.57*  1.66*   GLC  99  73       Liver Function Studies -   Recent Labs   Lab Test  01/05/18   0745  05/10/17   0610   PROTTOTAL  7.4  6.7*   ALBUMIN  3.8  3.3*   BILITOTAL  0.4  0.3   ALKPHOS  46  41   AST  20  17   ALT  22  18       Lab Results   Component Value Date    A1C 7.8 01/05/2018    A1C 6.5 01/30/2017         Assessment/Plan:    Vascular dementia with behavioral disturbance  Paranoia (H)  - move a.m. Seroquel to 08 with other meds and continue at HS  - continue to monitor mood/behaviors closely    Type 2 diabetes mellitus with peripheral vascular disease (H) diabetes since 1980  - continue on daily Lantus, sched at 1000   - BGL as ordered (often refuses)  - does run high without pharmacotherapy        Electronically signed by  AL Rose CNP

## 2018-03-16 NOTE — PROGRESS NOTES
[employer] [place] [employer] (D) Pacemaker Device Check-PER REP AT NH  AP: 47 % : 6 %  Mode: DDD 60/130        Underlying Rhythm: SB, rate in the 50's  Heart Rate: Stable with adequate variability  Sensing: WNL    Pacing Threshold: WNL   Impedance: WNL  Battery Status: not obtained (Last year est. was still at > 5 years remaining)  Device Site: Not assessed by this RN0  Atrial Arrhythmia: 0  Ventricular Arrhythmia: 0  Setting Change: 0    Care Plan: Return to  3 month Phone teletraces. Family report pt is in long term care and will not be following with Cardiology. BRIGETTE Uribe

## 2018-04-23 NOTE — PROGRESS NOTES
[facility]    Chief Complaint   Patient presents with     skilled nursing Regulatory       HPI:    Cinthia Whittington is a 89 year old  (11/5/1924), who is being seen today for a federally mandated E/M visit at [facility].  HPI information obtained from: facility chart records. Today's concerns are:  Type 2 diabetes mellitus with peripheral vascular disease (H) diabetes since 1980  Lab Results   Component Value Date    A1C 7.8 01/05/2018    A1C 6.5 01/30/2017    A1C 7.6 09/14/2016    A1C 8.2 06/22/2016    A1C 8.3 12/08/2014     - patient often declines insulin and BGL checks- no hypoglycemia noted and BGL labile but generally between 80 and 300.    Hypertensive renal disease, stage 1 through stage 4 or unspecified chronic kidney disease  - renal fx stable  - BPs overall controlled  - declines to answer questions today    Chronic diastolic congestive heart failure (H)  - weights stable.     Vascular dementia with behavior disturbance  - often angry, declines cares- paranoid. Seroquel increase helped some. Son very involved.       ALLERGIES: Lisinopril and Remeron [mirtazapine]  PAST MEDICAL HISTORY:  has a past medical history of Anemia of chronic disease; ASCVD (arteriosclerotic cardiovascular disease) (10/15/2010); Blood loss anemia (2/5/2013); Carotid artery disease (H); Dementia; Diabetes mellitus (H); Diabetic macular edema (H); Diabetic neuropathy (H); GERD (gastroesophageal reflux disease); Hearing loss; HTN; Hypercholesteremia; Lymphedema; Multiple thyroid nodules; Posterior vitreous detachment of both eyes; PVD (peripheral vascular disease) (H) (2009); and Type 2 diabetes mellitus, uncontrolled (H).  PAST SURGICAL HISTORY:  has a past surgical history that includes SPINE FUSION,ANTER,3 SGMTS; hysterectomy, pap no longer indicated; SLING OPERATION FOR STRESS INCONTINENCE; VEIN IN SITU BYPASS GRAFT,FEM-POP (4/9/10); Amputate leg below knee (1/30/2013); cataract  iol, rt/lt (2-29-00,3-27-00); Heart Cath, Angioplasty (8/2010); Amputate leg below knee (2010); Eye surgery; R TKA; and history of low back surgery.  FAMILY HISTORY: family history includes DIABETES in her mother; Family History Negative in her brother, brother, brother, daughter, and son; Respiratory in her father.  SOCIAL HISTORY:  reports that she quit smoking about 38 years ago. She has never used smokeless tobacco. She reports that she drinks about 1.8 oz of alcohol per week  She reports that she does not use illicit drugs.    MEDICATIONS:  Current Outpatient Prescriptions   Medication Sig Dispense Refill     acetaminophen (TYLENOL) 500 MG tablet Take 500 mg by mouth every 4 hours as needed for mild pain (max 3 g / 24 hrs) Max 3g/24hrs       acetaminophen (TYLENOL) 500 MG tablet Take 500 mg by mouth 3 times daily        amLODIPine (NORVASC) 10 MG tablet Take 10 mg by mouth daily at 5PM       carvedilol (COREG) 12.5 MG tablet Take 1 tablet (12.5 mg) by mouth 2 times daily (with meals) 180 tablet 1     cholecalciferol (VITAMIN D3) 1000 UNIT tablet Take 1,000 Units by mouth daily       clopidogrel (PLAVIX) 75 MG tablet Take 1 tablet (75 mg) by mouth daily 90 tablet 1     diclofenac (VOLTAREN) 1 % GEL topical gel Apply 2 g topically 4 times daily Apply to L side of low back and into posterior L thigh       donepezil (ARICEPT) 10 MG tablet Take 1 tablet (10 mg) by mouth At Bedtime 90 tablet 1     fluticasone (FLONASE) 50 MCG/ACT spray Spray 1 spray into both nostrils 2 times daily        Glycerin, Laxative, (GLYCERIN, ADULT,) 80.7 % SUPP Place 1 suppository rectally daily as needed (constipation)       HYDRALAZINE HCL PO Take 100 mg by mouth 3 times daily        insulin glargine (LANTUS) 100 UNIT/ML PEN Inject 25 Units Subcutaneous every morning Raise dose by one unit if AM glucoses are over 130.        LOSARTAN POTASSIUM PO Take 100 mg by mouth daily Hold for SBP <115       Multiple Vitamins-Minerals (MULTIVITAMIN  "PO) Take 1 Tablespoonful by mouth daily (with breakfast) With iron       nitroglycerin (NITROSTAT) 0.4 MG SL tablet Place 1 tablet (0.4 mg) under the tongue every 5 minutes as needed for chest pain If still having symptoms after 3 doses (15 minutes) call 911. 25 tablet 0     Nystatin POWD Apply topically 2 times daily as needed       PREDNISONE PO Take 2.5 mg by mouth every morning       QUEtiapine Fumarate (SEROQUEL PO) Take 12.5 mg by mouth 2 times daily        SIMVASTATIN PO Take 10 mg by mouth At Bedtime        Medications reviewed:  Medications reconciled to facility chart and changes were made to reflect current medications as identified as above med list. Below are the changes that were made:   Medications stopped since last EPIC medication reconciliation:   Medications Discontinued During This Encounter   Medication Reason     GUAIFENESIN PO Discontinued by another Health Care Provider     polyethylene glycol 0.4%- propylene glycol 0.3% (SYSTANE ULTRA) 0.4-0.3 % SOLN ophthalmic solution Discontinued by another Health Care Provider       Medications started since last [facility] medication reconciliation:  No orders of the defined types were placed in this encounter.        Case Management:  I have reviewed the care plan and MDS and do agree with the plan. Patient's desire to return to the community is not assessible due to cognitive impairment.  Information reviewed:  Medications, vital signs, orders, and nursing notes.    ROS:  Limited secondary to cognitive impairment but today pt reports as above    Exam:  Vitals: /67  Pulse 64  Temp 98  F (36.7  C)  Resp 18  Ht 5' 1.5\" (1.562 m)  Wt (!) 1377 lb 6.4 oz (624.8 kg)  SpO2 96%  .04 kg/m2  BMI= Body mass index is 256.04 kg/(m^2).     Resp: Effort WNL, LSCTA   CV: VSS  Abd- soft, nontender, BS +  [facility]- NELSON- w/c  Psych- alert- as above       BP 02/20-03/24: 114-155/61-67 mmHg  Weights:  04/15: 124.4lbs  03/27: 137.4lbs  02/27: 124.0lbs  02/14: " 133.2lbs  02/13: 126.5lbs  02/06: 123.0lbs  BGL 04/11-04/22: 125-395 mg/dL      Lab/Diagnostic data:     CBC RESULTS:   Recent Labs   Lab Test  01/05/18   0745  04/19/17   0640   WBC  5.3  3.6*   RBC  2.96*  2.73*   HGB  9.3*  8.3*   HCT  28.5*  26.0*   MCV  96  95   MCH  31.4  30.4   MCHC  32.6  31.9   RDW  13.9  14.9   PLT  168  144*       Last Basic Metabolic Panel:  Recent Labs   Lab Test  01/05/18   0745  04/19/17   0640   NA  141  142   POTASSIUM  4.7  4.6   CHLORIDE  109  109   VIDHI  8.8  8.4*   CO2  27  28   BUN  37*  35*   CR  1.57*  1.66*   GLC  99  73       Liver Function Studies -   Recent Labs   Lab Test  01/05/18   0745  05/10/17   0610   PROTTOTAL  7.4  6.7*   ALBUMIN  3.8  3.3*   BILITOTAL  0.4  0.3   ALKPHOS  46  41   AST  20  17   ALT  22  18     Lab Results   Component Value Date    A1C 7.8 01/05/2018    A1C 6.5 01/30/2017       ASSESSMENT/PLAN  Type 2 diabetes mellitus with peripheral vascular disease (H) diabetes since 1980  - controlled as well as possible given challenges with compliance in setting of dementia  - not a candidate for oral meds  - continue on Lantus and BLG daily at alternating times. Nsg to report hypoglycemia per standard protocol  - A1C due in July    Hypertensive renal disease, stage 1 through stage 4 or unspecified chronic kidney disease  - stable/controlled  - limit labs as patient usually declines   avoid nephrotoxic agents  - continue on amlodipine, Coreg, Hydralazine, Losartan  - VS per unit protocol    Chronic diastolic congestive heart failure (H)  - no active s/s   - continue on med regimen as above  - follow weights, VS, clinically    Vascular dementia with behavior disturbance  - Progressive disease, continued global decline is anticipated.  - Continue supportive cares and treatments in LTC. Ongoing advanced care planning.  - continue on Aricept - is yet quite communicative and active on unit -and Seroquel 2/2 paranoia and aggression.   - Monitor mood and behaviors  closely- ongoing redirection, distraction, and supportive approach.       :          Electronically signed by:  AL Rose CNP

## 2018-05-13 NOTE — TELEPHONE ENCOUNTER
this evening.  A/ high probably 2/2 drinking orange juice.    P/ Push water to compensate for osmotic diureis.  Electronically signed by Anna Wheeler  on May 13, 2018 5:14 PM

## 2018-06-20 NOTE — MR AVS SNAPSHOT
"              After Visit Summary   6/20/2018    Cinthia Whittington    MRN: [id number]           Patient Information     Date Of Birth          11/5/1924        Visit Information        Provider Department      6/20/2018 2:00 PM GUILLERMO SANTANA [facility]        Today's Diagnoses     Cardiac pacemaker in situ    -  1       Follow-ups after your visit        Your next 10 appointments already scheduled     Sep 26, 2018  2:00 PM CDT   Phone Device Check with LI TECHNatasha   [facility] ([facility])    [address] [place], [place] [id number]  [facility] [address] [phone] OPT 2              Who to contact     If you have questions or need follow up information about today's clinic visit or your schedule please contact [facility] directly at [phone].  Normal or non-critical lab and imaging results will be communicated to you by [employer], letter or phone within 4 business days after the clinic has received the results. If you do not hear from us within 7 days, please contact the clinic through [employer] or phone. If you have a critical or abnormal lab result, we will notify you by phone as soon as possible.  Submit refill requests through [employer] or call your pharmacy and they will forward the refill request to us. Please allow 3 business days for your refill to be completed.          Additional Information About Your Visit        MyChart Information     [employer] lets you send messages to your doctor, view your test results, renew your prescriptions, schedule appointments and more. To sign up, go to [email or web address] . Click on \"Log in\" on the left side of the screen, which will take you to the Welcome page. Then click on \"Sign up Now\" on the right side of the page.     You will be asked to enter the access code listed below, as well as some personal information. Please follow the directions to create " your username and password.     Your access code is: [email or web address]  Expires: 2018  2:06 PM     Your access code will  in 90 days. If you need help or a new code, please call your [facility] clinic or [phone].        Care EveryWhere ID     This is your Care EveryWhere ID. This could be used by other organizations to access your [facility] medical records  [id number]         Blood Pressure from Last 3 Encounters:   18 126/59   18 155/67   18 114/61    Weight from Last 3 Encounters:   18 56.9 kg (125 lb 6.4 oz)   18 (!) 624.8 kg (1377 lb 6.4 oz)   18 56.2 kg (124 lb)              We Performed the Following     PM PHONE R STRIP EVAL UP TO 90 DAYS ([phone])        Primary Care Provider Office Phone # Fax #    Danna Opal Choudhary, APRN [phone]       [address] [place] [address]        Equal Access to Services     [facility]: Hadii aad ku hadasho Soomaali, waaxda luqadaha, qaybta kaalmada adefinnyada, vincent richey . So [facility] [phone].    ATENCIÓN: Si habla español, tiene a real disposición servicios gratuitos de asistencia lingüística. Llame al [phone].    We comply with applicable federal civil rights laws and [place] laws. We do not discriminate on the basis of race, color, national origin, age, disability, sex, sexual orientation, or gender identity.            Thank you!     Thank you for choosing [facility]  for your care. Our goal is always to provide you with excellent care. Hearing back from our patients is one way we can continue to improve our services. Please take a few minutes to complete the written survey that you may receive in the mail after your visit with us. Thank you!             Your Updated Medication List - Protect others around you: Learn how to safely use, store and throw away your medicines at www.disposemymeds.org.          This list is  accurate as of 6/20/18  2:06 PM.  Always use your most recent med list.                   Brand Name Dispense Instructions for use Diagnosis    amLODIPine 10 MG tablet    NORVASC     Take 10 mg by mouth daily at 5PM        carvedilol 12.5 MG tablet    COREG    180 tablet    Take 1 tablet (12.5 mg) by mouth 2 times daily (with meals)    Essential hypertension, benign       cholecalciferol 1000 UNIT tablet    vitamin D3     Take 1,000 Units by mouth daily        clopidogrel 75 MG tablet    PLAVIX    90 tablet    Take 1 tablet (75 mg) by mouth daily    PVD (peripheral vascular disease) (H)       diclofenac 1 % Gel topical gel    VOLTAREN     Apply 2 g topically 4 times daily Apply to L side of low back and into posterior L thigh        donepezil 10 MG tablet    ARICEPT    90 tablet    Take 1 tablet (10 mg) by mouth At Bedtime    Dementia without behavioral disturbance, unspecified dementia type       fluticasone 50 MCG/ACT spray    FLONASE     Spray 1 spray into both nostrils 2 times daily        Glycerin (Laxative) 80.7 % Supp      Place 1 suppository rectally daily as needed (constipation)        HYDRALAZINE HCL PO      Take 100 mg by mouth 3 times daily        insulin glargine 100 UNIT/ML injection    LANTUS     Inject 25 Units Subcutaneous every morning Raise dose by one unit if AM glucoses are over 130.        LOSARTAN POTASSIUM PO      Take 100 mg by mouth daily Hold for SBP <115        nitroGLYcerin 0.4 MG sublingual tablet    NITROSTAT    25 tablet    Place 1 tablet (0.4 mg) under the tongue every 5 minutes as needed for chest pain If still having symptoms after 3 doses (15 minutes) call 911.    Chest pain, unspecified chest pain type       Nystatin Powd      Apply topically 2 times daily as needed        PREDNISONE PO      Take 2.5 mg by mouth every morning        SEROQUEL PO      Take 12.5 mg by mouth 2 times daily        SIMVASTATIN PO      Take 10 mg by mouth At Bedtime        * TYLENOL 500 MG tablet    Generic drug:  acetaminophen      Take 500 mg by mouth every 4 hours as needed for mild pain (max 3 g / 24 hrs) Max 3g/24hrs        * acetaminophen 500 MG tablet    TYLENOL     Take 500 mg by mouth 3 times daily        * Notice:  This list has 2 medication(s) that are the same as other medications prescribed for you. Read the directions carefully, and ask your doctor or other care provider to review them with you.

## 2018-07-02 NOTE — PROGRESS NOTES
"Cinthia Whittington is a 89 year old female seen June 19, 2018 at [facility] where she has resided for one and a half years (admit 11/2016) seen to follow up DM2 and vascular dementia.    Patient is seen on the unit, up to WC, fairly disinclined to answer questions, states \"I'm busy now.\"     By nursing staff report patient has paranoia and agitation, will call people names.  Quetiapine has been added; hard to know if that is helping.    Patient has longstanding DM2 with vascular complications and CKD stage 3.   She will refuse insulin, at times refuse to eat.       Past Medical History:   Diagnosis Date     Anemia of chronic disease      ASCVD (arteriosclerotic cardiovascular disease) 10/15/2010    8/2010 heart cath with 3 vessel disease and bare metal stent to RCA.      Blood loss anemia 2/5/2013     Carotid artery disease (H)      Dementia      Diabetes mellitus (H)      Diabetic macular edema (H)     Bilateral. OS-focal sofía treament. OD- observation as of 3/18/15.     Diabetic neuropathy (H)      GERD (gastroesophageal reflux disease)      Hearing loss      HTN      Hypercholesteremia      Lymphedema      Multiple thyroid nodules      Posterior vitreous detachment of both eyes      PVD (peripheral vascular disease) (H) 2009    bilateral     Type 2 diabetes mellitus, uncontrolled (H)       Past Surgical History:   Procedure Laterality Date     AMPUTATE LEG BELOW KNEE  1/30/2013    Procedure: AMPUTATE LEG BELOW KNEE;  LEFT BELOW KNEE AMPUTATION;  Surgeon: Les Hope MD;  Location: [facility] OR     AMPUTATE LEG BELOW KNEE  2010    right side by kaelyn PEREYRA SPINE FUSION,ANTER,3 SGMTS      posterior, not ant 8/08     C VEIN IN SITU BYPASS GRAFT,FEM-POP  4/9/10    right     CATARACT IOL, RT/LT  2-29-00,3-27-00     EYE SURGERY      Focal laser x 4     HC SLING OPERATION FOR STRESS INCONTINENCE       HEART CATH, ANGIOPLASTY  8/2010    3 vessel disease with bare metal stent to RCA     history " "of low back surgery       HYSTERECTOMY, PAP NO LONGER INDICATED       R TKA        SH:  , son Ayan is first contact.     ROS:  Limited as above   SLUMS 12/30   CPT 4.0      EXAM:  Up to WC, NAD  /59  Pulse 65  Temp 98  F (36.7  C)  Resp 18  Ht 5' 1.5\" (1.562 m)  Wt 125 lb 6.4 oz (56.9 kg)  SpO2 95%  BMI 23.31 kg/m2   Neck supple without adenopathy  Lungs clear bilaterally   Heart RRR s1s2 distant  Abd soft, NT, no distention, +BS  Ext: s/p bilateral BKAs  Neuro: no focal findings, limited history  Psych: affect confused, flat.     Labs reviewed.     IMP/PLAN:   (E11.51) Type 2 diabetes mellitus with peripheral vascular disease (H) diabetes since 1980  (Z89.512,  Z89.511) Status post bilateral below knee amputation (H)  Comment:   Lab Results   Component Value Date    A1C 7.8 01/05/2018    Plan: variable control, hope to avoid hypoglycemia.    She is on once daily Lantus, given after she receives her quetiapine.   She is on a statin, although doesn't reflect her goals of care.   Would d/c that at age 89.       (E11.22,  N18.3,  Z79.4) Type 2 diabetes mellitus with stage 3 chronic kidney disease, with long-term current use of insulin (H)  Comment: low GFR   Plan: remains on losartan; would minimize meds, maintain hydration.     (I50.32) Chronic diastolic congestive heart failure (H)  Comment:   BP Readings from Last 3 Encounters:   06/17/18 126/59   04/23/18 155/67   03/08/18 114/61      Plan: current regimen includes amlodipine, carvedilol, hydralazine, losartan    (F01.51) Vascular dementia with behavior disturbance  Comment: paranoia and disruptive behavioral sx  Plan: No indication for donepezil at this point, decrease to 5 mg/day and wean off.      LTC support for meds, meals, activity    AD: DNR/DNI, avoid hospitalization     Trinh Li MD   "

## 2018-07-03 NOTE — LETTER
"    7/3/2018        RE: Cinthia Whittington  [id number]  [facility] [address]        [facility]    Chief Complaint   Patient presents with     Dementia     Paranoid     Diabetes       [facility] Medical Record Number:  [id number]    HPI:    Cinthia Whittington is a 89 year old  (11/5/1924), who is being seen today for an episodic care visit at [facility].  HPI information obtained from: facility chart records.Today's concern is:     Paranoia (H)  Dementia  Type 2 diabetes mellitus with peripheral vascular disease (H)     With h/o anger, aggression and paranoia. Often yells or insults other residents and staff and refuses meds and tx intermittently often citing fear of \"trying to kill me\" or \"poison me\". Seroquel had been increased with some benefit as patient was taking medications for some time. Of late has refused meds last several days per nursing- including the Lantus insulin. Son aware and visits daily. Goals of care are comfort.     ALLERGIES: Lisinopril and Remeron [mirtazapine]  Past Medical, Surgical, Family and Social History reviewed and updated in [facility].    Current Outpatient Prescriptions   Medication Sig Dispense Refill     acetaminophen (TYLENOL) 500 MG tablet Take 500 mg by mouth 3 times daily        acetaminophen (TYLENOL) 500 MG tablet Take 500 mg by mouth every 4 hours as needed for mild pain (max 3 g / 24 hrs) Max 3g/24hrs       amLODIPine (NORVASC) 10 MG tablet Take 10 mg by mouth daily at 5PM       carvedilol (COREG) 12.5 MG tablet Take 1 tablet (12.5 mg) by mouth 2 times daily (with meals) 180 tablet 1     cholecalciferol (VITAMIN D3) 1000 UNIT tablet Take 1,000 Units by mouth daily       clopidogrel (PLAVIX) 75 MG tablet Take 1 tablet (75 mg) by mouth daily 90 tablet 1     diclofenac (VOLTAREN) 1 % GEL topical gel Apply 2 g topically 4 times daily Apply to L side of low back and into posterior L thigh       donepezil (ARICEPT) 10 MG " "tablet Take 1 tablet (10 mg) by mouth At Bedtime 90 tablet 1     Glycerin, Laxative, (GLYCERIN, ADULT,) 80.7 % SUPP Place 1 suppository rectally daily as needed (constipation)       HYDRALAZINE HCL PO Take 100 mg by mouth 3 times daily        insulin glargine (LANTUS) 100 UNIT/ML PEN Inject 25 Units Subcutaneous every morning Raise dose by one unit if AM glucoses are over 130.        LOSARTAN POTASSIUM PO Take 100 mg by mouth daily Hold for SBP <115       nitroglycerin (NITROSTAT) 0.4 MG SL tablet Place 1 tablet (0.4 mg) under the tongue every 5 minutes as needed for chest pain If still having symptoms after 3 doses (15 minutes) call 911. 25 tablet 0     Nystatin POWD Apply topically 2 times daily as needed       PREDNISONE PO Take 2.5 mg by mouth every morning       QUEtiapine Fumarate (SEROQUEL PO) Take 12.5 mg by mouth 2 times daily        SIMVASTATIN PO Take 10 mg by mouth At Bedtime        Medications reviewed:  Medications reconciled to facility chart and changes were made to reflect current medications as identified as above med list. Below are the changes that were made:   Medications stopped since last EPIC medication reconciliation:   Medications Discontinued During This Encounter   Medication Reason     fluticasone (FLONASE) 50 MCG/ACT spray Medication Reconciliation Clean Up       Medications started since last [facility] medication reconciliation:  No orders of the defined types were placed in this encounter.        REVIEW OF SYSTEMS:  Limited secondary to cognitive impairment but today pt reports   \"Go away. You get on my nerves\"  Briefly answers questions but then requests writer leave again. States does not want to take meds or be \"bothered by staff all day long\"    Physical Exam:  /60  Pulse 64  Temp 97.6  F (36.4  C)  Resp 18  Ht 5' 1.5\" (1.562 m)  Wt 125 lb 9.6 oz (57 kg)  SpO2 95%  BMI 23.35 kg/m2    Musc- in w/c  Psych- irritable.     BP 03/06-06/19: 114-155/59-67 mmHg  BGL 06/02-06/29: "   AM:  mg/dL  NOON: 108-154 mg/dL  PM: 227-453 mg/dL    Recent Labs:     CBC RESULTS:   Recent Labs   Lab Test  01/05/18   0745  04/19/17   0640   WBC  5.3  3.6*   RBC  2.96*  2.73*   HGB  9.3*  8.3*   HCT  28.5*  26.0*   MCV  96  95   MCH  31.4  30.4   MCHC  32.6  31.9   RDW  13.9  14.9   PLT  168  144*       Last Basic Metabolic Panel:  Recent Labs   Lab Test  01/05/18   0745  04/19/17   0640   NA  141  142   POTASSIUM  4.7  4.6   CHLORIDE  109  109   VIDHI  8.8  8.4*   CO2  27  28   BUN  37*  35*   CR  1.57*  1.66*   GLC  99  73       Liver Function Studies -   Recent Labs   Lab Test  01/05/18   0745  05/10/17   0610   PROTTOTAL  7.4  6.7*   ALBUMIN  3.8  3.3*   BILITOTAL  0.4  0.3   ALKPHOS  46  41   AST  20  17   ALT  22  18       Lab Results   Component Value Date    A1C 7.8 01/05/2018    A1C 6.5 01/30/2017         Assessment/Plan:  Paranoia (H)  Dementia  - will limit med burden and change so can take once daily  - d/c sched acetaminophen, aricept, hydralazine, prednisone and simvastatin  - change Coreg to ER 10 mg and give daily  - change amlodipine to once daily in a.m  - attempt to cluster cares/tx  - ongoing supportive cares in LTC        Type 2 diabetes mellitus with peripheral vascular disease (H) diabetes since 1980-   - patient does not always take the insulin- and BGL are only taken daily at alt times- but are elevated 200-300 at times. Will keep on Lantus 25 units daily and staff to attempt to administer - document when patient declines.   - no labs at this time as patient is likely to decline  - follow clinically    Call placed to patient son Ayan who called writer back and left VM agreeing to above POC        Electronically signed by  AL Rose CNP                      Sincerely,        AL Rose CNP

## 2018-07-03 NOTE — PROGRESS NOTES
"[facility]    Chief Complaint   Patient presents with     Dementia     Paranoid     Diabetes       [facility] Medical Record Number:  [id number]    HPI:    Cinthia Whittington is a 89 year old  (11/5/1924), who is being seen today for an episodic care visit at [facility].  HPI information obtained from: facility chart records.Today's concern is:     Paranoia (H)  Dementia  Type 2 diabetes mellitus with peripheral vascular disease (H)     With h/o anger, aggression and paranoia. Often yells or insults other residents and staff and refuses meds and tx intermittently often citing fear of \"trying to kill me\" or \"poison me\". Seroquel had been increased with some benefit as patient was taking medications for some time. Of late has refused meds last several days per nursing- including the Lantus insulin. Son aware and visits daily. Goals of care are comfort.     ALLERGIES: Lisinopril and Remeron [mirtazapine]  Past Medical, Surgical, Family and Social History reviewed and updated in [facility].    Current Outpatient Prescriptions   Medication Sig Dispense Refill     acetaminophen (TYLENOL) 500 MG tablet Take 500 mg by mouth 3 times daily        acetaminophen (TYLENOL) 500 MG tablet Take 500 mg by mouth every 4 hours as needed for mild pain (max 3 g / 24 hrs) Max 3g/24hrs       amLODIPine (NORVASC) 10 MG tablet Take 10 mg by mouth daily at 5PM       carvedilol (COREG) 12.5 MG tablet Take 1 tablet (12.5 mg) by mouth 2 times daily (with meals) 180 tablet 1     cholecalciferol (VITAMIN D3) 1000 UNIT tablet Take 1,000 Units by mouth daily       clopidogrel (PLAVIX) 75 MG tablet Take 1 tablet (75 mg) by mouth daily 90 tablet 1     diclofenac (VOLTAREN) 1 % GEL topical gel Apply 2 g topically 4 times daily Apply to L side of low back and into posterior L thigh       donepezil (ARICEPT) 10 MG tablet Take 1 tablet (10 mg) by mouth At Bedtime 90 tablet 1     Glycerin, Laxative, (GLYCERIN, " "ADULT,) 80.7 % SUPP Place 1 suppository rectally daily as needed (constipation)       HYDRALAZINE HCL PO Take 100 mg by mouth 3 times daily        insulin glargine (LANTUS) 100 UNIT/ML PEN Inject 25 Units Subcutaneous every morning Raise dose by one unit if AM glucoses are over 130.        LOSARTAN POTASSIUM PO Take 100 mg by mouth daily Hold for SBP <115       nitroglycerin (NITROSTAT) 0.4 MG SL tablet Place 1 tablet (0.4 mg) under the tongue every 5 minutes as needed for chest pain If still having symptoms after 3 doses (15 minutes) call 911. 25 tablet 0     Nystatin POWD Apply topically 2 times daily as needed       PREDNISONE PO Take 2.5 mg by mouth every morning       QUEtiapine Fumarate (SEROQUEL PO) Take 12.5 mg by mouth 2 times daily        SIMVASTATIN PO Take 10 mg by mouth At Bedtime        Medications reviewed:  Medications reconciled to facility chart and changes were made to reflect current medications as identified as above med list. Below are the changes that were made:   Medications stopped since last EPIC medication reconciliation:   Medications Discontinued During This Encounter   Medication Reason     fluticasone (FLONASE) 50 MCG/ACT spray Medication Reconciliation Clean Up       Medications started since last [facility] medication reconciliation:  No orders of the defined types were placed in this encounter.        REVIEW OF SYSTEMS:  Limited secondary to cognitive impairment but today pt reports   \"Go away. You get on my nerves\"  Briefly answers questions but then requests writer leave again. States does not want to take meds or be \"bothered by staff all day long\"    Physical Exam:  /60  Pulse 64  Temp 97.6  F (36.4  C)  Resp 18  Ht 5' 1.5\" (1.562 m)  Wt 125 lb 9.6 oz (57 kg)  SpO2 95%  BMI 23.35 kg/m2    Musc- in w/c  Psych- irritable.     BP 03/06-06/19: 114-155/59-67 mmHg  BGL 06/02-06/29:   AM:  mg/dL  NOON: 108-154 mg/dL  PM: 227-453 mg/dL    Recent Labs:     CBC RESULTS: "   Recent Labs   Lab Test  01/05/18   0745  04/19/17   0640   WBC  5.3  3.6*   RBC  2.96*  2.73*   HGB  9.3*  8.3*   HCT  28.5*  26.0*   MCV  96  95   MCH  31.4  30.4   MCHC  32.6  31.9   RDW  13.9  14.9   PLT  168  144*       Last Basic Metabolic Panel:  Recent Labs   Lab Test  01/05/18   0745  04/19/17   0640   NA  141  142   POTASSIUM  4.7  4.6   CHLORIDE  109  109   VIDHI  8.8  8.4*   CO2  27  28   BUN  37*  35*   CR  1.57*  1.66*   GLC  99  73       Liver Function Studies -   Recent Labs   Lab Test  01/05/18   0745  05/10/17   0610   PROTTOTAL  7.4  6.7*   ALBUMIN  3.8  3.3*   BILITOTAL  0.4  0.3   ALKPHOS  46  41   AST  20  17   ALT  22  18       Lab Results   Component Value Date    A1C 7.8 01/05/2018    A1C 6.5 01/30/2017         Assessment/Plan:  Paranoia (H)  Dementia  - will limit med burden and change so can take once daily  - d/c sched acetaminophen, aricept, hydralazine, prednisone and simvastatin  - change Coreg to ER 10 mg and give daily  - change amlodipine to once daily in a.m  - attempt to cluster cares/tx  - ongoing supportive cares in LTC        Type 2 diabetes mellitus with peripheral vascular disease (H) diabetes since 1980-   - patient does not always take the insulin- and BGL are only taken daily at alt times- but are elevated 200-300 at times. Will keep on Lantus 25 units daily and staff to attempt to administer - document when patient declines.   - no labs at this time as patient is likely to decline  - follow clinically    Call placed to patient son Ayan who called writer back and left VM agreeing to above POC        Electronically signed by  AL Rose CNP

## 2018-07-19 NOTE — PROGRESS NOTES
[facility]    Chief Complaint   Patient presents with     Confusion       [facility] Medical Record Number:  [id number]    HPI:    Cinthia Whittington is a 89 year old  (11/5/1924), who is being seen today for an episodic care visit at [facility].  HPI information obtained from: facility chart records.Today's concern is:     Confusion  Senile dementia, with behavioral disturbance  Paranoia (H)    Found in parking lot today- had wandered out alone. Patient confused, uncertain why went out there but confused as to why cannot go outside if desires to do so. No injury noted. Son present now. Long h/o paranoia, aggression and refusing cares and tx. Recent med reduction as patient often refusing and taking inconsistently. Denies new pain, cough, SOB, chest pain or dizziness. Denies abd pain or pain with voiding. Denies n/v.  Is actually much more calm and mild mannered than is usual.    ALLERGIES: Lisinopril and Remeron [mirtazapine]  Past Medical, Surgical, Family and Social History reviewed and updated in [facility].    Current Outpatient Prescriptions   Medication Sig Dispense Refill     acetaminophen (TYLENOL) 500 MG tablet Take 500 mg by mouth every 4 hours as needed for mild pain (max 3 g / 24 hrs) Max 3g/24hrs       amLODIPine (NORVASC) 10 MG tablet Take 10 mg by mouth daily at 5PM       clopidogrel (PLAVIX) 75 MG tablet Take 1 tablet (75 mg) by mouth daily 90 tablet 1     diclofenac (VOLTAREN) 1 % GEL topical gel Apply 2 g topically 4 times daily Apply to L side of low back and into posterior L thigh       DONEPEZIL HCL PO Take 5 mg by mouth At Bedtime       Glycerin, Laxative, (GLYCERIN, ADULT,) 80.7 % SUPP Place 1 suppository rectally daily as needed (constipation)       insulin glargine (LANTUS) 100 UNIT/ML PEN Inject 25 Units Subcutaneous every morning Raise dose by one unit if AM glucoses are over 130.        LOSARTAN POTASSIUM PO Take 100 mg by mouth daily Hold for SBP  "<115       METOPROLOL SUCCINATE ER PO Take 25 mg by mouth At Bedtime       nitroglycerin (NITROSTAT) 0.4 MG SL tablet Place 1 tablet (0.4 mg) under the tongue every 5 minutes as needed for chest pain If still having symptoms after 3 doses (15 minutes) call 911. 25 tablet 0     Nystatin POWD Apply topically 2 times daily as needed       PREDNISONE PO Take 1 mg by mouth At Bedtime       QUEtiapine Fumarate (SEROQUEL PO) Take 12.5 mg by mouth daily        Medications reviewed:  Medications reconciled to facility chart and changes were made to reflect current medications as identified as above med list. Below are the changes that were made:   Medications stopped since last EPIC medication reconciliation:   Medications Discontinued During This Encounter   Medication Reason     donepezil (ARICEPT) 10 MG tablet Discontinued by another Health Care Provider       Medications started since last [facility] medication reconciliation:  Orders Placed This Encounter   Medications     METOPROLOL SUCCINATE ER PO     Sig: Take 25 mg by mouth At Bedtime     DONEPEZIL HCL PO     Sig: Take 5 mg by mouth At Bedtime     PREDNISONE PO     Sig: Take 1 mg by mouth At Bedtime         REVIEW OF SYSTEMS:  Limited secondary to cognitive impairment but today pt reports as above    Physical Exam:  /66  Pulse 71  Temp 98  F (36.7  C)  Resp 16  Ht 5' 1.5\" (1.562 m)  Wt 123 lb 3.2 oz (55.9 kg)  SpO2 96%  BMI 22.9 kg/m2    Resp: Effort WNL, LSCTA   CV: S1-2, no S3-4, no murmurs noted- - no edema  Abd- soft, nontender, BS +  Musc- NELSON- bilateral LE prosthesis- w/c  Psych- alert, calm, pleasant      BP 03/24-07/17: 126-168/59-67 mmHg    Recent Labs:     CBC RESULTS:   Recent Labs   Lab Test  01/05/18   0745  04/19/17   0640   WBC  5.3  3.6*   RBC  2.96*  2.73*   HGB  9.3*  8.3*   HCT  28.5*  26.0*   MCV  96  95   MCH  31.4  30.4   MCHC  32.6  31.9   RDW  13.9  14.9   PLT  168  144*       Last Basic Metabolic Panel:  Recent Labs   Lab Test  " 01/05/18   0745  04/19/17   0640   NA  141  142   POTASSIUM  4.7  4.6   CHLORIDE  109  109   VIDHI  8.8  8.4*   CO2  27  28   BUN  37*  35*   CR  1.57*  1.66*   GLC  99  73       Liver Function Studies -   Recent Labs   Lab Test  01/05/18   0745  05/10/17   0610   PROTTOTAL  7.4  6.7*   ALBUMIN  3.8  3.3*   BILITOTAL  0.4  0.3   ALKPHOS  46  41   AST  20  17   ALT  22  18       Lab Results   Component Value Date    A1C 7.8 01/05/2018    A1C 6.5 01/30/2017         Assessment/Plan:  Confusion  Senile dementia, with behavioral disturbance  Paranoia (H)  - no evidence of infection, afebrile. Change likely d/t mx med changes 2/2 patient frequent refusal to take- and if so to take minimal pills and once daily. Patient has been taking meds of late per son and nursing  - add back Aricept at lowered dose - 5mg baron HS with other meds  - add back lower dose prednisone 1 mg daily HS with other meds  - safety checks and cues  - monitor mood and behaviors and VS closely         Electronically signed by  AL Rose CNP

## 2018-07-20 NOTE — PROGRESS NOTES
[place] [facility]    Chief Complaint   Patient presents with     Confusion       [place] Medical Record Number:  [id number]    HPI:    Cinthia Whittington is a 89 year old  (11/5/1924), who is being seen today for an episodic care visit at [facility].  HPI information obtained from: facility chart records.Today's concern is:     Confusion  Paranoia (H)  Senile dementia, with behavioral disturbance     Found wandering and in parking lot yesterday. Recent med adj and changes 2/2 patient refusing meds/tx and taking inconsistently. Added back low dose Aricpet and prednisone. Patient takes all meds at one time HS when she is most likely to agree to take them.   Per staff today no further wandering. Remains confused intermittently. VSS.     ALLERGIES: Lisinopril and Remeron [mirtazapine]  Past Medical, Surgical, Family and Social History reviewed and updated in EPIC.    Current Outpatient Prescriptions   Medication Sig Dispense Refill     acetaminophen (TYLENOL) 500 MG tablet Take 500 mg by mouth every 4 hours as needed for mild pain (max 3 g / 24 hrs) Max 3g/24hrs       amLODIPine (NORVASC) 10 MG tablet Take 10 mg by mouth daily at 5PM       clopidogrel (PLAVIX) 75 MG tablet Take 1 tablet (75 mg) by mouth daily 90 tablet 1     diclofenac (VOLTAREN) 1 % GEL topical gel Apply 2 g topically 4 times daily Apply to L side of low back and into posterior L thigh       DONEPEZIL HCL PO Take 5 mg by mouth At Bedtime       Glycerin, Laxative, (GLYCERIN, ADULT,) 80.7 % SUPP Place 1 suppository rectally daily as needed (constipation)       insulin glargine (LANTUS) 100 UNIT/ML PEN Inject 25 Units Subcutaneous every morning Raise dose by one unit if AM glucoses are over 130.        LOSARTAN POTASSIUM PO Take 100 mg by mouth daily Hold for SBP <115       METOPROLOL SUCCINATE ER PO Take 25 mg by mouth At Bedtime       nitroglycerin (NITROSTAT) 0.4 MG SL tablet Place 1 tablet (0.4 mg) under the  "tongue every 5 minutes as needed for chest pain If still having symptoms after 3 doses (15 minutes) call 911. 25 tablet 0     Nystatin POWD Apply topically 2 times daily as needed       PREDNISONE PO Take 1 mg by mouth At Bedtime       QUEtiapine Fumarate (SEROQUEL PO) Take 12.5 mg by mouth daily        Medications reviewed:  Medications reconciled to facility chart and changes were made to reflect current medications as identified as above med list. Below are the changes that were made:   Medications stopped since last EPIC medication reconciliation:   There are no discontinued medications.    Medications started since last [facility] medication reconciliation:  No orders of the defined types were placed in this encounter.        REVIEW OF SYSTEMS:  Limited secondary to cognitive impairment but today pt reports - denies pain or concerns    Physical Exam:  /68  Pulse 72  Temp 97.7  F (36.5  C)  Resp 18  Ht 5' 1.5\" (1.562 m)  Wt 123 lb 3.2 oz (55.9 kg)  SpO2 97%  BMI 22.9 kg/m2    Resp: Effort WN  CV: VSS as above  Abd- soft, nontender, BS +  Musc- NELSON- bilateral BKA  Psych- alert, conversive, memory impaired      BP 03/24-07/19: 126-169/59-68 mmHg      Recent Labs:     CBC RESULTS:   Recent Labs   Lab Test  01/05/18   0745  04/19/17   0640   WBC  5.3  3.6*   RBC  2.96*  2.73*   HGB  9.3*  8.3*   HCT  28.5*  26.0*   MCV  96  95   MCH  31.4  30.4   MCHC  32.6  31.9   RDW  13.9  14.9   PLT  168  144*       Last Basic Metabolic Panel:  Recent Labs   Lab Test  01/05/18   0745  04/19/17   0640   NA  141  142   POTASSIUM  4.7  4.6   CHLORIDE  109  109   VIDHI  8.8  8.4*   CO2  27  28   BUN  37*  35*   CR  1.57*  1.66*   GLC  99  73       Liver Function Studies -   Recent Labs   Lab Test  01/05/18   0745  05/10/17   0610   PROTTOTAL  7.4  6.7*   ALBUMIN  3.8  3.3*   BILITOTAL  0.4  0.3   ALKPHOS  46  41   AST  20  17   ALT  22  18     Lab Results   Component Value Date    A1C 7.8 01/05/2018    A1C 6.5 01/30/2017 "         Assessment/Plan:  Confusion  Paranoia (H)  Senile dementia, with behavioral disturbance  - continue on Seroquel  - and added back low dose Aricept- 5mg - given acute confusion after sudden d/c  - prednisone added back 2/2 has been on it for a long time for chronic back pain, added back at 1 mg (ws on 2.5) and will taper 2/2 concern adrenal crisis given acute confusion with sudden d/c  - safety checks, cues and ongoing supportive cares/tx in LTC    Electronically signed by  AL Rose CNP                i

## 2018-07-23 NOTE — PROGRESS NOTES
"SUBJECTIVE:       Altered mental status, unspecified altered mental status type  Wandering  Alzheimer's dementia with behavioral disturbance, unspecified timing of dementia onset    - found wandering and exit seeking x 2 today. This is new behavior for this patient. Happened x 1 last week after med changes 2/2 recurrent refusal to take meds and consequential inconsistent admin of meds. Now all given at HS. Lower dose Aricept and prednisone added back, but today patient found \"dazed\" and reportedly expressing confusion. Holding a pile of clothes and heading toward exit outside.     Currently patient is calm. Son is present. Patient reports now that just wanted to sit out on patio. Denies pain. Denies fever, chills, SOB,cough,  trouble with urination or constipation or diarrhea.     Son Ayan confirms comfort care and do not hospitalize. Patient has long h/o refusing cares and interventions.     OBJECTIVE:    VS reviewed and are stable.   - .Resp: Effort WNL, LSCTA   CV: S1-2, no S3-4, no murmurs noted-- VSS  Abd- soft, nontender, BS +  Musc- NELSON- bilat BKA- prosthesis on, W/C  - Neuros intact, but appears slightly dazed  Psych- alert, calm, pleasant      ASSESSMENT/PLAN:    Altered mental status, unspecified altered mental status type  Wandering  Alzheimer's dementia with behavioral disturbance, unspecified timing of dementia onset  - This is a new behavior for this patient. ? Etiology. Multiple recent med changes- but patient has not taken meds consistently for some time. Has h/o CAD and angioplasty and known carotid artery disease. Is on Plavix. Has been off ASA since 2016. VSS- is afebrile. Patient's son is hoping not to hospitalize- does not feel his mother will tolerate and will likely refuse all treatments and intervention.  - will check CBC, BMP, TSH, UA/UC and get CXR- stat- for now patient is in agreement with these interventions and son is present to help facilitation  - son providing 1:1 for now and nursing " will then provide q 15 minute safety checks.   - monitor mood and behaviors closely      Electronically signed by  AL Rose CNP

## 2018-07-24 NOTE — PROGRESS NOTES
[place] [facility]    Chief Complaint   Patient presents with     DEA       [place] Medical Record Number:  [id number]    HPI:    Cinthia Whittington is a 89 year old  (11/5/1924), who is being seen today for an episodic care visit at [facility].  HPI information obtained from: facility chart records.Today's concern is:     Altered mental status, unspecified altered mental status type  Wandering  Alzheimer's dementia with behavioral disturbance, unspecified timing of dementia onset     3 wandering and dazed episodes in last week.   Labs reviewed, UC +, appears slight dehydrated. CXR negative. No WBC count. Remains anemic, but stable.   Today patient is found in room. Alert, pleasant but forgetful. Denies pain.     ALLERGIES: Lisinopril and Remeron [mirtazapine]  Past Medical, Surgical, Family and Social History reviewed and updated in EPIC.    Current Outpatient Prescriptions   Medication Sig Dispense Refill     acetaminophen (TYLENOL) 500 MG tablet Take 500 mg by mouth every 4 hours as needed for mild pain (max 3 g / 24 hrs) Max 3g/24hrs       amLODIPine (NORVASC) 10 MG tablet Take 10 mg by mouth daily at 5PM       clopidogrel (PLAVIX) 75 MG tablet Take 1 tablet (75 mg) by mouth daily 90 tablet 1     diclofenac (VOLTAREN) 1 % GEL topical gel Apply 2 g topically 4 times daily Apply to L side of low back and into posterior L thigh       DONEPEZIL HCL PO Take 5 mg by mouth At Bedtime       Glycerin, Laxative, (GLYCERIN, ADULT,) 80.7 % SUPP Place 1 suppository rectally daily as needed (constipation)       insulin glargine (LANTUS) 100 UNIT/ML PEN Inject 25 Units Subcutaneous every morning Raise dose by one unit if AM glucoses are over 130.        LOSARTAN POTASSIUM PO Take 100 mg by mouth daily Hold for SBP <115       METOPROLOL SUCCINATE ER PO Take 25 mg by mouth At Bedtime       nitroglycerin (NITROSTAT) 0.4 MG SL tablet Place 1 tablet (0.4 mg) under the tongue every 5  "minutes as needed for chest pain If still having symptoms after 3 doses (15 minutes) call 911. 25 tablet 0     Nystatin POWD Apply topically 2 times daily as needed       PREDNISONE PO Take 1 mg by mouth At Bedtime       QUEtiapine Fumarate (SEROQUEL PO) Take 12.5 mg by mouth daily        Medications reviewed:  Medications reconciled to facility chart and changes were made to reflect current medications as identified as above med list. Below are the changes that were made:   Medications stopped since last EPIC medication reconciliation:   There are no discontinued medications.    Medications started since last [facility] medication reconciliation:  No orders of the defined types were placed in this encounter.        REVIEW OF SYSTEMS:  Limited secondary to cognitive impairment but today pt reports as aove    Physical Exam:  /75  Pulse 60  Temp 97.6  F (36.4  C)  Resp 18  Ht 5' 1.5\" (1.562 m)  Wt 123 lb 3.2 oz (55.9 kg)  SpO2 95%  BMI 22.9 kg/m2    Resp: Effort WNL, LSCTA   CV: VS as above  Abd- soft, nontender, BS +  Musc- NELSON- w/c, bilateral BKA- wearing prosthesis  Neuros are intact  Psych- alert, calm, pleasant, confused/forgetful       BP 03/24-07/19: 126-169/59-68 mmHg    Recent Labs:     CBC RESULTS:   Recent Labs   Lab Test  07/23/18   1650  01/05/18   0745   WBC  4.9  5.3   RBC  3.18*  2.96*   HGB  9.8*  9.3*   HCT  29.5*  28.5*   MCV  93  96   MCH  30.8  31.4   MCHC  33.2  32.6   RDW  12.3  13.9   PLT  202  168       Last Basic Metabolic Panel:  Recent Labs   Lab Test  07/23/18   1650  01/05/18   0745   NA  141  141   POTASSIUM  4.8  4.7   CHLORIDE  109  109   VIDHI  9.1  8.8   CO2  24  27   BUN  55*  37*   CR  1.59*  1.57*   GLC  154*  99       Liver Function Studies -   Recent Labs   Lab Test  01/05/18   0745  05/10/17   0610   PROTTOTAL  7.4  6.7*   ALBUMIN  3.8  3.3*   BILITOTAL  0.4  0.3   ALKPHOS  46  41   AST  20  17   ALT  22  18       TSH   Date Value Ref Range Status   07/23/2018 1.71 0.40 - " 4.00 mU/L Final   03/31/2014 1.557 0.300 - 5.000 uIU/ml Final       Lab Results   Component Value Date    A1C 7.8 01/05/2018    A1C 6.5 01/30/2017         Assessment/Plan:  Altered mental status, unspecified altered mental status type  Wandering  Alzheimer's dementia with behavioral disturbance, unspecified timing of dementia onset  - This is a new behavior for this patient. ? Etiology. Multiple recent med changes- but patient has not taken meds consistently for some time. Added back low dose Aricept and Prednisone after first wandering/confusion episode last week. Has h/o CAD and angioplasty and known carotid artery disease. Is on Plavix. Has been off ASA since 2016. VSS- is afebrile. Patient's son does not want  hospitalize- does not feel his mother will tolerate and will likely refuse all treatments and intervention.  --labs and dx test results reviewed, ? + UTI and slight dry- discussed case with Dr. Li, will add stress dose of prednisone and treat UTI - Cipro 250 mg BID x 5 days- Nsg to look for and update on duty provider when sensitivities resulted. Will not add ASA 2/2 anemia and risk for bleed and ? Benefit for CVA prevention. Continues on Plavix  - son comes twice daily and providing 1:1 when here and nursing has patient on frequent safety checks.   - monitor mood and behaviors closely                Electronically signed by  AL Rose CNP

## 2018-07-24 NOTE — LETTER
7/24/2018        RE: Cinthia Whittington  [id number]  [facility] [address]        [place] [facility]    Chief Complaint   Patient presents with     RECHECK       [place] Medical Record Number:  [id number]    HPI:    Cinthia Whittington is a 89 year old  (11/5/1924), who is being seen today for an episodic care visit at [facility].  HPI information obtained from: facility chart records.Today's concern is:     Altered mental status, unspecified altered mental status type  Wandering  Alzheimer's dementia with behavioral disturbance, unspecified timing of dementia onset     3 wandering and dazed episodes in last week.   Labs reviewed, UC +, appears slight dehydrated. CXR negative. No WBC count. Remains anemic, but stable.   Today patient is found in room. Alert, pleasant but forgetful. Denies pain.     ALLERGIES: Lisinopril and Remeron [mirtazapine]  Past Medical, Surgical, Family and Social History reviewed and updated in [facility].    Current Outpatient Prescriptions   Medication Sig Dispense Refill     acetaminophen (TYLENOL) 500 MG tablet Take 500 mg by mouth every 4 hours as needed for mild pain (max 3 g / 24 hrs) Max 3g/24hrs       amLODIPine (NORVASC) 10 MG tablet Take 10 mg by mouth daily at 5PM       clopidogrel (PLAVIX) 75 MG tablet Take 1 tablet (75 mg) by mouth daily 90 tablet 1     diclofenac (VOLTAREN) 1 % GEL topical gel Apply 2 g topically 4 times daily Apply to L side of low back and into posterior L thigh       DONEPEZIL HCL PO Take 5 mg by mouth At Bedtime       Glycerin, Laxative, (GLYCERIN, ADULT,) 80.7 % SUPP Place 1 suppository rectally daily as needed (constipation)       insulin glargine (LANTUS) 100 UNIT/ML PEN Inject 25 Units Subcutaneous every morning Raise dose by one unit if AM glucoses are over 130.        LOSARTAN POTASSIUM PO Take 100 mg by mouth daily Hold for SBP <115       METOPROLOL SUCCINATE ER PO Take 25 mg by mouth At Bedtime        "nitroglycerin (NITROSTAT) 0.4 MG SL tablet Place 1 tablet (0.4 mg) under the tongue every 5 minutes as needed for chest pain If still having symptoms after 3 doses (15 minutes) call 911. 25 tablet 0     Nystatin POWD Apply topically 2 times daily as needed       PREDNISONE PO Take 1 mg by mouth At Bedtime       QUEtiapine Fumarate (SEROQUEL PO) Take 12.5 mg by mouth daily        Medications reviewed:  Medications reconciled to facility chart and changes were made to reflect current medications as identified as above med list. Below are the changes that were made:   Medications stopped since last EPIC medication reconciliation:   There are no discontinued medications.    Medications started since last [facility] medication reconciliation:  No orders of the defined types were placed in this encounter.        REVIEW OF SYSTEMS:  Limited secondary to cognitive impairment but today pt reports as aove    Physical Exam:  /75  Pulse 60  Temp 97.6  F (36.4  C)  Resp 18  Ht 5' 1.5\" (1.562 m)  Wt 123 lb 3.2 oz (55.9 kg)  SpO2 95%  BMI 22.9 kg/m2    Resp: Effort WNL, LSCTA   CV: VS as above  Abd- soft, nontender, BS +  Musc- NELSON- w/c, bilateral BKA- wearing prosthesis  Neuros are intact  Psych- alert, calm, pleasant, confused/forgetful       BP 03/24-07/19: 126-169/59-68 mmHg    Recent Labs:     CBC RESULTS:   Recent Labs   Lab Test  07/23/18   1650  01/05/18   0745   WBC  4.9  5.3   RBC  3.18*  2.96*   HGB  9.8*  9.3*   HCT  29.5*  28.5*   MCV  93  96   MCH  30.8  31.4   MCHC  33.2  32.6   RDW  12.3  13.9   PLT  202  168       Last Basic Metabolic Panel:  Recent Labs   Lab Test  07/23/18   1650  01/05/18   0745   NA  141  141   POTASSIUM  4.8  4.7   CHLORIDE  109  109   VIDHI  9.1  8.8   CO2  24  27   BUN  55*  37*   CR  1.59*  1.57*   GLC  154*  99       Liver Function Studies -   Recent Labs   Lab Test  01/05/18   0745  05/10/17   0610   PROTTOTAL  7.4  6.7*   ALBUMIN  3.8  3.3*   BILITOTAL  0.4  0.3   ALKPHOS  46  41 "   AST  20  17   ALT  22  18       TSH   Date Value Ref Range Status   07/23/2018 1.71 0.40 - 4.00 mU/L Final   03/31/2014 1.557 0.300 - 5.000 uIU/ml Final       Lab Results   Component Value Date    A1C 7.8 01/05/2018    A1C 6.5 01/30/2017         Assessment/Plan:  Altered mental status, unspecified altered mental status type  Wandering  Alzheimer's dementia with behavioral disturbance, unspecified timing of dementia onset  - This is a new behavior for this patient. ? Etiology. Multiple recent med changes- but patient has not taken meds consistently for some time. Added back low dose Aricept and Prednisone after first wandering/confusion episode last week. Has h/o CAD and angioplasty and known carotid artery disease. Is on Plavix. Has been off ASA since 2016. VSS- is afebrile. Patient's son does not want  hospitalize- does not feel his mother will tolerate and will likely refuse all treatments and intervention.  --labs and dx test results reviewed, ? + UTI and slight dry- discussed case with Dr. Li, will add stress dose of prednisone and treat UTI - Cipro 250 mg BID x 5 days- Nsg to look for and update on duty provider when sensitivities resulted. Will not add ASA 2/2 anemia and risk for bleed and ? Benefit for CVA prevention. Continues on Plavix  - son comes twice daily and providing 1:1 when here and nursing has patient on frequent safety checks.   - monitor mood and behaviors closely                Electronically signed by  AL Rose CNP                      Sincerely,        AL Rose CNP

## 2018-07-30 NOTE — LETTER
7/30/2018        RE: Cinthia Whittington  [address]  [facility] [address]        [place] [facility]    Chief Complaint   Patient presents with     Dementia       [employer] Medical Record Number:  [id number]    HPI:    Cinthia Whittington is a 89 year old  (11/5/1924), who is being seen today for an episodic care visit at [facility].  HPI information obtained from: facility chart records.Today's concern is:  Alzheimer's dementia with behavioral disturbance, unspecified timing of dementia onset    Exit seeking behavior has accelerated as has confusion. Found outside facility in parking lot x 2. Tx recently for UTI. Back on low dose Aricept and Prednisone which had been d/cd prior to recent change in MS 2/2 patient frequently refusing meds and treatments and wanting to take meds one time daily.     Today patient is clearly disoriented and confused. Goals of care are comfort. Son is really hoping we can keep patient at [facility]. Social service updated and is working to find possible placement on psych unit. None available at this time. Son and staff providing patient with 1:1 supervision today.     ALLERGIES: Lisinopril and Remeron [mirtazapine]  Past Medical, Surgical, Family and Social History reviewed and updated in [facility].    Current Outpatient Prescriptions   Medication Sig Dispense Refill     acetaminophen (TYLENOL) 500 MG tablet Take 500 mg by mouth every 4 hours as needed for mild pain (max 3 g / 24 hrs) Max 3g/24hrs       amLODIPine (NORVASC) 10 MG tablet Take 10 mg by mouth daily at 5PM       clopidogrel (PLAVIX) 75 MG tablet Take 1 tablet (75 mg) by mouth daily 90 tablet 1     diclofenac (VOLTAREN) 1 % GEL topical gel Apply 2 g topically 4 times daily Apply to L side of low back and into posterior L thigh       DONEPEZIL HCL PO Take 5 mg by mouth At Bedtime       Glycerin, Laxative, (GLYCERIN, ADULT,) 80.7 % SUPP Place 1 suppository rectally daily as needed  "(constipation)       insulin glargine (LANTUS) 100 UNIT/ML PEN Inject 25 Units Subcutaneous every morning Raise dose by one unit if AM glucoses are over 130.        METOPROLOL SUCCINATE ER PO Take 25 mg by mouth At Bedtime       nitroglycerin (NITROSTAT) 0.4 MG SL tablet Place 1 tablet (0.4 mg) under the tongue every 5 minutes as needed for chest pain If still having symptoms after 3 doses (15 minutes) call 911. 25 tablet 0     Nystatin POWD Apply topically 2 times daily as needed       PREDNISONE PO Take 2.5 mg by mouth At Bedtime        QUEtiapine Fumarate (SEROQUEL PO) Take 12.5 mg by mouth daily        QUETIAPINE FUMARATE PO Take 12.5 mg by mouth as needed       QUETIAPINE FUMARATE PO Take 25 mg by mouth At Bedtime       Medications reviewed:  Medications reconciled to facility chart and changes were made to reflect current medications as identified as above med list. Below are the changes that were made:   Medications stopped since last EPIC medication reconciliation:   Medications Discontinued During This Encounter   Medication Reason     LOSARTAN POTASSIUM PO Discontinued by another Health Care Provider       Medications started since last [facility] medication reconciliation:  Orders Placed This Encounter   Medications     QUETIAPINE FUMARATE PO     Sig: Take 12.5 mg by mouth as needed     QUETIAPINE FUMARATE PO     Sig: Take 25 mg by mouth At Bedtime         REVIEW OF SYSTEMS:  Limited secondary to cognitive impairment but today pt reports \"I'm fine\" and denies SOB, chest pain, dizziness, or new pain    Physical Exam:  /70  Pulse 60  Temp 98.6  F (37  C)  Resp 20  Ht 5' 1.5\" (1.562 m)  Wt 123 lb 3.2 oz (55.9 kg)  SpO2 98%  BMI 22.9 kg/m2    Resp: Effort WNL, LSCTA   CV: VSS  Abd- soft, nontender, BS +  Musc- w/c dependent  Psych- alert, calm, pleasant       BP 07/20-07/30: 130-163/52-75 mmHg    Recent Labs:     CBC RESULTS:   Recent Labs   Lab Test  07/23/18   1650  01/05/18   0745   WBC  4.9  5.3 "   RBC  3.18*  2.96*   HGB  9.8*  9.3*   HCT  29.5*  28.5*   MCV  93  96   MCH  30.8  31.4   MCHC  33.2  32.6   RDW  12.3  13.9   PLT  202  168       Last Basic Metabolic Panel:  Recent Labs   Lab Test  07/23/18   1650  01/05/18   0745   NA  141  141   POTASSIUM  4.8  4.7   CHLORIDE  109  109   VIDHI  9.1  8.8   CO2  24  27   BUN  55*  37*   CR  1.59*  1.57*   GLC  154*  99       Liver Function Studies -   Recent Labs   Lab Test  01/05/18   0745  05/10/17   0610   PROTTOTAL  7.4  6.7*   ALBUMIN  3.8  3.3*   BILITOTAL  0.4  0.3   ALKPHOS  46  41   AST  20  17   ALT  22  18       TSH   Date Value Ref Range Status   07/23/2018 1.71 0.40 - 4.00 mU/L Final   03/31/2014 1.557 0.300 - 5.000 uIU/ml Final       Lab Results   Component Value Date    A1C 7.8 01/05/2018    A1C 6.5 01/30/2017         Assessment/Plan:  Alzheimer's dementia with behavioral disturbance, unspecified timing of dementia onset  - discussed with Dr. Li  - will increase Seroquel to 25 mg at HS and add 12.5 mg in a.m and 12.5 mg prn for paranoia, severe agitation/psychosis  - Aricept has been added back at lower dose  - will give stress prednisone bump and then continue on previous dosing of 2.5 mg daily  - son Ayan and staff are providing frequent reassurance, safety checks.   - social service is looking for bed at psychiatric hospital at this time.       Total time spent with patient visit was 35 min including patient visit, review of past records and discussion with patient son and nursing staff. Greater than 50% of total time spent with counseling and coordinating care due to as above.       Electronically signed by  AL Rose CNP                      Sincerely,        AL Rose CNP

## 2018-07-31 NOTE — PROGRESS NOTES
[facility]    Chief Complaint   Patient presents with     Dementia       [facility] Medical Record Number:  [id number]    HPI:    Cinthia Whittington is a 89 year old  (11/5/1924), who is being seen today for an episodic care visit at [facility].  HPI information obtained from: facility chart records.Today's concern is:  Alzheimer's dementia with behavioral disturbance, unspecified timing of dementia onset    Exit seeking behavior has accelerated as has confusion. Found outside facility in parking lot x 2. Tx recently for UTI. Back on low dose Aricept and Prednisone which had been d/cd prior to recent change in MS 2/2 patient frequently refusing meds and treatments and wanting to take meds one time daily.     Today patient is clearly disoriented and confused. Goals of care are comfort. Son is really hoping we can keep patient at [facility]. Social service updated and is working to find possible placement on psych unit. None available at this time. Son and staff providing patient with 1:1 supervision today.     ALLERGIES: Lisinopril and Remeron [mirtazapine]  Past Medical, Surgical, Family and Social History reviewed and updated in [facility].    Current Outpatient Prescriptions   Medication Sig Dispense Refill     acetaminophen (TYLENOL) 500 MG tablet Take 500 mg by mouth every 4 hours as needed for mild pain (max 3 g / 24 hrs) Max 3g/24hrs       amLODIPine (NORVASC) 10 MG tablet Take 10 mg by mouth daily at 5PM       clopidogrel (PLAVIX) 75 MG tablet Take 1 tablet (75 mg) by mouth daily 90 tablet 1     diclofenac (VOLTAREN) 1 % GEL topical gel Apply 2 g topically 4 times daily Apply to L side of low back and into posterior L thigh       DONEPEZIL HCL PO Take 5 mg by mouth At Bedtime       Glycerin, Laxative, (GLYCERIN, ADULT,) 80.7 % SUPP Place 1 suppository rectally daily as needed (constipation)       insulin glargine (LANTUS) 100 UNIT/ML PEN Inject 25 Units Subcutaneous every  "morning Raise dose by one unit if AM glucoses are over 130.        METOPROLOL SUCCINATE ER PO Take 25 mg by mouth At Bedtime       nitroglycerin (NITROSTAT) 0.4 MG SL tablet Place 1 tablet (0.4 mg) under the tongue every 5 minutes as needed for chest pain If still having symptoms after 3 doses (15 minutes) call 911. 25 tablet 0     Nystatin POWD Apply topically 2 times daily as needed       PREDNISONE PO Take 2.5 mg by mouth At Bedtime        QUEtiapine Fumarate (SEROQUEL PO) Take 12.5 mg by mouth daily        QUETIAPINE FUMARATE PO Take 12.5 mg by mouth as needed       QUETIAPINE FUMARATE PO Take 25 mg by mouth At Bedtime       Medications reviewed:  Medications reconciled to facility chart and changes were made to reflect current medications as identified as above med list. Below are the changes that were made:   Medications stopped since last EPIC medication reconciliation:   Medications Discontinued During This Encounter   Medication Reason     LOSARTAN POTASSIUM PO Discontinued by another Health Care Provider       Medications started since last [facility] medication reconciliation:  Orders Placed This Encounter   Medications     QUETIAPINE FUMARATE PO     Sig: Take 12.5 mg by mouth as needed     QUETIAPINE FUMARATE PO     Sig: Take 25 mg by mouth At Bedtime         REVIEW OF SYSTEMS:  Limited secondary to cognitive impairment but today pt reports \"I'm fine\" and denies SOB, chest pain, dizziness, or new pain    Physical Exam:  /70  Pulse 60  Temp 98.6  F (37  C)  Resp 20  Ht 5' 1.5\" (1.562 m)  Wt 123 lb 3.2 oz (55.9 kg)  SpO2 98%  BMI 22.9 kg/m2    Resp: Effort WNL, LSCTA   CV: VSS  Abd- soft, nontender, BS +  Musc- w/c dependent  Psych- alert, calm, pleasant       BP 07/20-07/30: 130-163/52-75 mmHg    Recent Labs:     CBC RESULTS:   Recent Labs   Lab Test  07/23/18   1650  01/05/18   0745   WBC  4.9  5.3   RBC  3.18*  2.96*   HGB  9.8*  9.3*   HCT  29.5*  28.5*   MCV  93  96   MCH  30.8  31.4   MCHC  " 33.2  32.6   RDW  12.3  13.9   PLT  202  168       Last Basic Metabolic Panel:  Recent Labs   Lab Test  07/23/18   1650  01/05/18   0745   NA  141  141   POTASSIUM  4.8  4.7   CHLORIDE  109  109   VIDHI  9.1  8.8   CO2  24  27   BUN  55*  37*   CR  1.59*  1.57*   GLC  154*  99       Liver Function Studies -   Recent Labs   Lab Test  01/05/18   0745  05/10/17   0610   PROTTOTAL  7.4  6.7*   ALBUMIN  3.8  3.3*   BILITOTAL  0.4  0.3   ALKPHOS  46  41   AST  20  17   ALT  22  18       TSH   Date Value Ref Range Status   07/23/2018 1.71 0.40 - 4.00 mU/L Final   03/31/2014 1.557 0.300 - 5.000 uIU/ml Final       Lab Results   Component Value Date    A1C 7.8 01/05/2018    A1C 6.5 01/30/2017         Assessment/Plan:  Alzheimer's dementia with behavioral disturbance, unspecified timing of dementia onset  - discussed with Dr. Li  - will increase Seroquel to 25 mg at HS and add 12.5 mg in a.m and 12.5 mg prn for paranoia, severe agitation/psychosis  - Aricept has been added back at lower dose  - will give stress prednisone bump and then continue on previous dosing of 2.5 mg daily  - son Ayan and staff are providing frequent reassurance, safety checks.   - social service is looking for bed at psychiatric hospital at this time.       Total time spent with patient visit was 35 min including patient visit, review of past records and discussion with patient son and nursing staff. Greater than 50% of total time spent with counseling and coordinating care due to as above.       Electronically signed by  AL Rose CNP

## 2018-08-03 NOTE — PROGRESS NOTES
[facility]    Chief Complaint   Patient presents with     USP Acute       [facility] Medical Record Number:  [id number]    HPI:    Cinthia Whittington is a 89 year old  (11/5/1924), who is being seen today for an episodic care visit at [facility].  HPI information obtained from: facility chart records.Today's concern is:  Alzheimer's dementia with behavioral disturbance, unspecified timing of dementia onset   Per son and nursing patient less agitated and aggressive with change to Seroquel and exit seeking is being mitigated with nonpharm interventions. Per son she is taking her meds most of the time. Son Ayan is present daily and times visits to coordinate with patient cares/tx. Is really hoping patient will stabilize so can stay at [facility]    ALLERGIES: Lisinopril and Remeron [mirtazapine]  Past Medical, Surgical, Family and Social History reviewed and updated in [email or web address].    Current Outpatient Prescriptions   Medication Sig Dispense Refill     acetaminophen (TYLENOL) 500 MG tablet Take 500 mg by mouth every 4 hours as needed for mild pain (max 3 g / 24 hrs) Max 3g/24hrs       amLODIPine (NORVASC) 10 MG tablet Take 10 mg by mouth daily at 5PM       clopidogrel (PLAVIX) 75 MG tablet Take 1 tablet (75 mg) by mouth daily 90 tablet 1     diclofenac (VOLTAREN) 1 % GEL topical gel Apply 2 g topically 4 times daily Apply to L side of low back and into posterior L thigh       DONEPEZIL HCL PO Take 5 mg by mouth At Bedtime       Glycerin, Laxative, (GLYCERIN, ADULT,) 80.7 % SUPP Place 1 suppository rectally daily as needed (constipation)       insulin glargine (LANTUS) 100 UNIT/ML PEN Inject 25 Units Subcutaneous every morning Raise dose by one unit if AM glucoses are over 130.        METOPROLOL SUCCINATE ER PO Take 25 mg by mouth At Bedtime       nitroglycerin (NITROSTAT) 0.4 MG SL tablet Place 1 tablet (0.4 mg) under the tongue every 5 minutes as needed for chest pain If  "still having symptoms after 3 doses (15 minutes) call 911. 25 tablet 0     Nystatin POWD Apply topically 2 times daily as needed       PREDNISONE PO Take 2.5 mg by mouth At Bedtime        QUEtiapine Fumarate (SEROQUEL PO) Take 12.5 mg by mouth daily        QUETIAPINE FUMARATE PO Take 12.5 mg by mouth as needed       QUETIAPINE FUMARATE PO Take 25 mg by mouth At Bedtime       Medications reviewed:  Medications reconciled to facility chart and changes were made to reflect current medications as identified as above med list. Below are the changes that were made:   Medications stopped since last EPIC medication reconciliation:   There are no discontinued medications.    Medications started since last [facility] medication reconciliation:  No orders of the defined types were placed in this encounter.        REVIEW OF SYSTEMS:  Unobtainable secondary to cognitive impairment.     Physical Exam:  BP (!) 149/95  Pulse 60  Temp 98  F (36.7  C)  Resp 18  Ht 5' 1.5\" (1.562 m)  Wt 123 lb 3.2 oz (55.9 kg)  SpO2 96%  BMI 22.9 kg/m2    Resp: Effort WNL  CV: VSS  Abd- soft, nontender, BS +  Musc- bilateral BKA- w/c  Psych- alert, calm, pleasant      BP 07/24-08/01: 133-157/52-95 mmHg    Recent Labs:     CBC RESULTS:   Recent Labs   Lab Test  07/23/18   1650  01/05/18   0745   WBC  4.9  5.3   RBC  3.18*  2.96*   HGB  9.8*  9.3*   HCT  29.5*  28.5*   MCV  93  96   MCH  30.8  31.4   MCHC  33.2  32.6   RDW  12.3  13.9   PLT  202  168       Last Basic Metabolic Panel:  Recent Labs   Lab Test  07/23/18   1650  01/05/18   0745   NA  141  141   POTASSIUM  4.8  4.7   CHLORIDE  109  109   VIDHI  9.1  8.8   CO2  24  27   BUN  55*  37*   CR  1.59*  1.57*   GLC  154*  99       Liver Function Studies -   Recent Labs   Lab Test  01/05/18   0745  05/10/17   0610   PROTTOTAL  7.4  6.7*   ALBUMIN  3.8  3.3*   BILITOTAL  0.4  0.3   ALKPHOS  46  41   AST  20  17   ALT  22  18       TSH   Date Value Ref Range Status   07/23/2018 1.71 0.40 - 4.00 mU/L " Final   03/31/2014 1.557 0.300 - 5.000 uIU/ml Final     Lab Results   Component Value Date    A1C 7.8 01/05/2018    A1C 6.5 01/30/2017         Assessment/Plan:  Alzheimer's dementia with behavioral disturbance, unspecified timing of dementia onset  - improving with current interventions- readdition of lower dose Aricept and longstanding prednisone and increase in Seroquel dosing  - continue prn Seroquel for now as does not always respond to nonpharm interventions and prn Seroquel is helpful in relieving acute paranoia and agitation.   Noted PRN orders for antipsychotic medications are limited to 14 days. Face to Face encounter done today. Evaluation of Seroqul medication indicates without this medication the patient's safety is in jeopardy due to as above,              Electronically signed by  AL Rose CNP

## 2018-08-03 NOTE — LETTER
8/3/2018        RE: Cinthia Whittington  [id number]  [facility] [address]        [facility]    Chief Complaint   Patient presents with     shelter Acute       [place] Medical Record Number:  [id number]    HPI:    Cinthia Whittington is a 89 year old  (11/5/1924), who is being seen today for an episodic care visit at [facility].  HPI information obtained from: facility chart records.Today's concern is:  Alzheimer's dementia with behavioral disturbance, unspecified timing of dementia onset   Per son and nursing patient less agitated and aggressive with change to Seroquel and exit seeking is being mitigated with nonpharm interventions. Per son she is taking her meds most of the time. Son Ayan is present daily and times visits to coordinate with patient cares/tx. Is really hoping patient will stabilize so can stay at [facility]    ALLERGIES: Lisinopril and Remeron [mirtazapine]  Past Medical, Surgical, Family and Social History reviewed and updated in [facility].    Current Outpatient Prescriptions   Medication Sig Dispense Refill     acetaminophen (TYLENOL) 500 MG tablet Take 500 mg by mouth every 4 hours as needed for mild pain (max 3 g / 24 hrs) Max 3g/24hrs       amLODIPine (NORVASC) 10 MG tablet Take 10 mg by mouth daily at 5PM       clopidogrel (PLAVIX) 75 MG tablet Take 1 tablet (75 mg) by mouth daily 90 tablet 1     diclofenac (VOLTAREN) 1 % GEL topical gel Apply 2 g topically 4 times daily Apply to L side of low back and into posterior L thigh       DONEPEZIL HCL PO Take 5 mg by mouth At Bedtime       Glycerin, Laxative, (GLYCERIN, ADULT,) 80.7 % SUPP Place 1 suppository rectally daily as needed (constipation)       insulin glargine (LANTUS) 100 UNIT/ML PEN Inject 25 Units Subcutaneous every morning Raise dose by one unit if AM glucoses are over 130.        METOPROLOL SUCCINATE ER PO Take 25 mg by mouth At Bedtime       nitroglycerin (NITROSTAT) 0.4 MG SL  "tablet Place 1 tablet (0.4 mg) under the tongue every 5 minutes as needed for chest pain If still having symptoms after 3 doses (15 minutes) call 911. 25 tablet 0     Nystatin POWD Apply topically 2 times daily as needed       PREDNISONE PO Take 2.5 mg by mouth At Bedtime        QUEtiapine Fumarate (SEROQUEL PO) Take 12.5 mg by mouth daily        QUETIAPINE FUMARATE PO Take 12.5 mg by mouth as needed       QUETIAPINE FUMARATE PO Take 25 mg by mouth At Bedtime       Medications reviewed:  Medications reconciled to facility chart and changes were made to reflect current medications as identified as above med list. Below are the changes that were made:   Medications stopped since last EPIC medication reconciliation:   There are no discontinued medications.    Medications started since last [facility] medication reconciliation:  No orders of the defined types were placed in this encounter.        REVIEW OF SYSTEMS:  Unobtainable secondary to cognitive impairment.     Physical Exam:  BP (!) 149/95  Pulse 60  Temp 98  F (36.7  C)  Resp 18  Ht 5' 1.5\" (1.562 m)  Wt 123 lb 3.2 oz (55.9 kg)  SpO2 96%  BMI 22.9 kg/m2    Resp: Effort WNL  CV: VSS  Abd- soft, nontender, BS +  Musc- bilateral BKA- w/c  Psych- alert, calm, pleasant      BP 07/24-08/01: 133-157/52-95 mmHg    Recent Labs:     CBC RESULTS:   Recent Labs   Lab Test  07/23/18   1650  01/05/18   0745   WBC  4.9  5.3   RBC  3.18*  2.96*   HGB  9.8*  9.3*   HCT  29.5*  28.5*   MCV  93  96   MCH  30.8  31.4   MCHC  33.2  32.6   RDW  12.3  13.9   PLT  202  168       Last Basic Metabolic Panel:  Recent Labs   Lab Test  07/23/18   1650  01/05/18   0745   NA  141  141   POTASSIUM  4.8  4.7   CHLORIDE  109  109   VIDHI  9.1  8.8   CO2  24  27   BUN  55*  37*   CR  1.59*  1.57*   GLC  154*  99       Liver Function Studies -   Recent Labs   Lab Test  01/05/18   0745  05/10/17   0610   PROTTOTAL  7.4  6.7*   ALBUMIN  3.8  3.3*   BILITOTAL  0.4  0.3   ALKPHOS  46  41   AST  20  17 "   ALT  22  18       TSH   Date Value Ref Range Status   07/23/2018 1.71 0.40 - 4.00 mU/L Final   03/31/2014 1.557 0.300 - 5.000 uIU/ml Final     Lab Results   Component Value Date    A1C 7.8 01/05/2018    A1C 6.5 01/30/2017         Assessment/Plan:  Alzheimer's dementia with behavioral disturbance, unspecified timing of dementia onset  - improving with current interventions- readdition of lower dose Aricept and longstanding prednisone and increase in Seroquel dosing  - continue prn Seroquel for now as does not always respond to nonpharm interventions and prn Seroquel is helpful in relieving acute paranoia and agitation.   Noted PRN orders for antipsychotic medications are limited to 14 days. Face to Face encounter done today. Evaluation of Seroqul medication indicates without this medication the patient's safety is in jeopardy due to as above,              Electronically signed by  AL Rose CNP                      Sincerely,        AL Rose CNP

## 2018-08-07 NOTE — PROGRESS NOTES
[facility]    Chief Complaint   Patient presents with     longterm Regulatory       [facility] Medical Record Number:  [id number]    HPI:    Cinthia Whittington is a 89 year old  (11/5/1924), who is being seen today for a federally mandated E/M visit at [facility].  HPI information obtained from: facility chart records. Today's concerns are:     Vascular dementia with behavior disturbance  - more confused and exit seeking of late- somewhat stablized with med changes and nonpharm interventions but continues to requires 1:1 at times. Son very involved and visits daily- some discussion among team for memory care placement    Type 2 diabetes mellitus with peripheral vascular disease (H)  Hypertensive renal disease, stage 1 through stage 4 or unspecified chronic kidney disease  Chronic diastolic congestive heart failure (H)    - patient only takes medications intermittently. Changes in timing and reduction in pil burden have been trialed.   Goals of care are comfort  VSS- NAD currently    ALLERGIES: Lisinopril and Remeron [mirtazapine]  PAST MEDICAL HISTORY:  has a past medical history of Anemia of chronic disease; ASCVD (arteriosclerotic cardiovascular disease) (10/15/2010); Blood loss anemia (2/5/2013); Carotid artery disease (H); Dementia; Diabetes mellitus (H); Diabetic macular edema (H); Diabetic neuropathy (H); GERD (gastroesophageal reflux disease); Hearing loss; HTN; Hypercholesteremia; Lymphedema; Multiple thyroid nodules; Posterior vitreous detachment of both eyes; PVD (peripheral vascular disease) (H) (2009); and Type 2 diabetes mellitus, uncontrolled (H).  PAST SURGICAL HISTORY:  has a past surgical history that includes SPINE FUSION,ANTER,3 SGMTS; hysterectomy, pap no longer indicated; SLING OPERATION FOR STRESS INCONTINENCE; VEIN IN SITU BYPASS GRAFT,FEM-POP (4/9/10); Amputate leg below knee (1/30/2013); cataract iol, rt/lt (2-29-00,3-27-00); Heart Cath,  Angioplasty (8/2010); Amputate leg below knee (2010); Eye surgery; R TKA; and history of low back surgery.  FAMILY HISTORY: family history includes Diabetes in her mother; Family History Negative in her brother, brother, brother, daughter, and son; Respiratory in her father.  SOCIAL HISTORY:  reports that she quit smoking about 39 years ago. She has never used smokeless tobacco. She reports that she drinks about 1.8 oz of alcohol per week  She reports that she does not use illicit drugs.    MEDICATIONS:  Current Outpatient Prescriptions   Medication Sig Dispense Refill     acetaminophen (TYLENOL) 500 MG tablet Take 500 mg by mouth every 4 hours as needed for mild pain (max 3 g / 24 hrs) Max 3g/24hrs       amLODIPine (NORVASC) 10 MG tablet Take 10 mg by mouth daily at 5PM       clopidogrel (PLAVIX) 75 MG tablet Take 1 tablet (75 mg) by mouth daily 90 tablet 1     diclofenac (VOLTAREN) 1 % GEL topical gel Apply 2 g topically 4 times daily Apply to L side of low back and into posterior L thigh       DONEPEZIL HCL PO Take 5 mg by mouth At Bedtime       Glycerin, Laxative, (GLYCERIN, ADULT,) 80.7 % SUPP Place 1 suppository rectally daily as needed (constipation)       insulin glargine (LANTUS) 100 UNIT/ML PEN Inject 25 Units Subcutaneous every morning Raise dose by one unit if AM glucoses are over 130.        METOPROLOL SUCCINATE ER PO Take 25 mg by mouth At Bedtime       nitroglycerin (NITROSTAT) 0.4 MG SL tablet Place 1 tablet (0.4 mg) under the tongue every 5 minutes as needed for chest pain If still having symptoms after 3 doses (15 minutes) call 911. 25 tablet 0     Nystatin POWD Apply topically 2 times daily as needed       PREDNISONE PO Take 2.5 mg by mouth At Bedtime        QUEtiapine Fumarate (SEROQUEL PO) Take 12.5 mg by mouth daily        QUETIAPINE FUMARATE PO Take 12.5 mg by mouth as needed       QUETIAPINE FUMARATE PO Take 25 mg by mouth At Bedtime       Medications reviewed:  Medications reconciled to  "facility chart and changes were made to reflect current medications as identified as above med list. Below are the changes that were made:   Medications stopped since last EPIC medication reconciliation:   There are no discontinued medications.    Medications started since last [facility] medication reconciliation:  No orders of the defined types were placed in this encounter.        Case Management:  I have reviewed the care plan and MDS and do agree with the plan. Patient's desire to return to the community is not assessible due to cognitive impairment.  Information reviewed:  Medications, vital signs, orders, and nursing notes.    ROS:  Unobtainable secondary to cognitive impairment.     Exam:  Vitals: /75  Pulse 75  Temp 98.2  F (36.8  C)  Resp 17  Ht 5' 1.5\" (1.562 m)  Wt 123 lb 3.2 oz (55.9 kg)  SpO2 96%  BMI 22.9 kg/m2  BMI= Body mass index is 22.9 kg/(m^2).    Resp: Effort WNL, LSCTA   CV: VSS-   Abd- soft, nontender, BS +  Musc-w/c- bilateral BKA  Psych- alert, calm,  Pleasant today      Lab/Diagnostic data:     CBC RESULTS:   Recent Labs   Lab Test  07/23/18   1650  01/05/18   0745   WBC  4.9  5.3   RBC  3.18*  2.96*   HGB  9.8*  9.3*   HCT  29.5*  28.5*   MCV  93  96   MCH  30.8  31.4   MCHC  33.2  32.6   RDW  12.3  13.9   PLT  202  168       Last Basic Metabolic Panel:  Recent Labs   Lab Test  07/23/18   1650  01/05/18   0745   NA  141  141   POTASSIUM  4.8  4.7   CHLORIDE  109  109   VIDHI  9.1  8.8   CO2  24  27   BUN  55*  37*   CR  1.59*  1.57*   GLC  154*  99       Liver Function Studies -   Recent Labs   Lab Test  01/05/18   0745  05/10/17   0610   PROTTOTAL  7.4  6.7*   ALBUMIN  3.8  3.3*   BILITOTAL  0.4  0.3   ALKPHOS  46  41   AST  20  17   ALT  22  18       TSH   Date Value Ref Range Status   07/23/2018 1.71 0.40 - 4.00 mU/L Final   03/31/2014 1.557 0.300 - 5.000 uIU/ml Final       Lab Results   Component Value Date    A1C 7.8 01/05/2018    A1C 6.5 01/30/2017 "       ASSESSMENT/PLAN    Vascular dementia with behavior disturbance  - Has had an acute change in confusion with increased paranoia and exit seeking behaviors - responding somewhat to adjustment in meds and after tx of UTI. Goals of care are comfort. Progressive disease, continued global decline is anticipated.  - Continue supportive cares and treatments in LTC. With ongoing eval and consideration of memory care placement  - continue on low dose Aricept and on Seroquel.  - Monitor mood and behaviors closely- ongoing redirection, distraction, and supportive approach.       Type 2 diabetes mellitus with peripheral vascular disease (H) diabetes since 1980  - controlled as well as possible given challenges with compliance in setting of dementia  - not a candidate for oral meds  - continue on Lantus and BLG daily at alternating times. Nsg to report hypoglycemia per standard protocol  - A1C if patient will allow     Hypertensive renal disease, stage 1 through stage 4 or unspecified chronic kidney disease  - stable/controlled  - limit labs as patient usually declines   avoid nephrotoxic agents  - continue on metoprolol XL (changed from BID Coreg)  and amlodipine- hydralazine and Losartan stopped recently to decrease pill burden and simplify schedule to mainly daily med admin to maximize compliance  - VS per unit protocol     Chronic diastolic congestive heart failure (H)  - no active s/s   - continue on med regimen as above  - follow weights, VS, clinically      Chronic left sided low back pain with sciatica  - completed stress dose of steroids after low dose pred stopped and now on previous low dose pred d/t noting increased confusion after d/cd  - pain appears well controlled       Electronically signed by:  AL Rose CNP

## 2018-08-07 NOTE — LETTER
8/7/2018        RE: Cinthia Whittington  [address]  [facility] [address]          [place] [facility]    Chief Complaint   Patient presents with     [facility] Regulatory       [place] Medical Record Number:  [id number]    HPI:    Cinthia Whittington is a 89 year old  (11/5/1924), who is being seen today for a federally mandated E/M visit at [facility].  HPI information obtained from: facility chart records. Today's concerns are:     Vascular dementia with behavior disturbance  - more confused and exit seeking of late- somewhat stablized with med changes and nonpharm interventions but continues to requires 1:1 at times. Son very involved and visits daily- some discussion among team for memory care placement    Type 2 diabetes mellitus with peripheral vascular disease (H)  Hypertensive renal disease, stage 1 through stage 4 or unspecified chronic kidney disease  Chronic diastolic congestive heart failure (H)    - patient only takes medications intermittently. Changes in timing and reduction in pil burden have been trialed.   Goals of care are comfort  VSS- NAD currently    ALLERGIES: Lisinopril and Remeron [mirtazapine]  PAST MEDICAL HISTORY:  has a past medical history of Anemia of chronic disease; ASCVD (arteriosclerotic cardiovascular disease) (10/15/2010); Blood loss anemia (2/5/2013); Carotid artery disease (H); Dementia; Diabetes mellitus (H); Diabetic macular edema (H); Diabetic neuropathy (H); GERD (gastroesophageal reflux disease); Hearing loss; HTN; Hypercholesteremia; Lymphedema; Multiple thyroid nodules; Posterior vitreous detachment of both eyes; PVD (peripheral vascular disease) (H) (2009); and Type 2 diabetes mellitus, uncontrolled (H).  PAST SURGICAL HISTORY:  has a past surgical history that includes SPINE FUSION,ANTER,3 SGMTS; hysterectomy, pap no longer indicated; SLING OPERATION FOR STRESS INCONTINENCE; VEIN IN SITU BYPASS GRAFT,FEM-POP  (4/9/10); Amputate leg below knee (1/30/2013); cataract iol, rt/lt (2-29-00,3-27-00); Heart Cath, Angioplasty (8/2010); Amputate leg below knee (2010); Eye surgery; R TKA; and history of low back surgery.  FAMILY HISTORY: family history includes Diabetes in her mother; Family History Negative in her brother, brother, brother, daughter, and son; Respiratory in her father.  SOCIAL HISTORY:  reports that she quit smoking about 39 years ago. She has never used smokeless tobacco. She reports that she drinks about 1.8 oz of alcohol per week  She reports that she does not use illicit drugs.    MEDICATIONS:  Current Outpatient Prescriptions   Medication Sig Dispense Refill     acetaminophen (TYLENOL) 500 MG tablet Take 500 mg by mouth every 4 hours as needed for mild pain (max 3 g / 24 hrs) Max 3g/24hrs       amLODIPine (NORVASC) 10 MG tablet Take 10 mg by mouth daily at 5PM       clopidogrel (PLAVIX) 75 MG tablet Take 1 tablet (75 mg) by mouth daily 90 tablet 1     diclofenac (VOLTAREN) 1 % GEL topical gel Apply 2 g topically 4 times daily Apply to L side of low back and into posterior L thigh       DONEPEZIL HCL PO Take 5 mg by mouth At Bedtime       Glycerin, Laxative, (GLYCERIN, ADULT,) 80.7 % SUPP Place 1 suppository rectally daily as needed (constipation)       insulin glargine (LANTUS) 100 UNIT/ML PEN Inject 25 Units Subcutaneous every morning Raise dose by one unit if AM glucoses are over 130.        METOPROLOL SUCCINATE ER PO Take 25 mg by mouth At Bedtime       nitroglycerin (NITROSTAT) 0.4 MG SL tablet Place 1 tablet (0.4 mg) under the tongue every 5 minutes as needed for chest pain If still having symptoms after 3 doses (15 minutes) call 911. 25 tablet 0     Nystatin POWD Apply topically 2 times daily as needed       PREDNISONE PO Take 2.5 mg by mouth At Bedtime        QUEtiapine Fumarate (SEROQUEL PO) Take 12.5 mg by mouth daily        QUETIAPINE FUMARATE PO Take 12.5 mg by mouth as needed       QUETIAPINE  "FUMARATE PO Take 25 mg by mouth At Bedtime       Medications reviewed:  Medications reconciled to facility chart and changes were made to reflect current medications as identified as above med list. Below are the changes that were made:   Medications stopped since last EPIC medication reconciliation:   There are no discontinued medications.    Medications started since last [facility] medication reconciliation:  No orders of the defined types were placed in this encounter.        Case Management:  I have reviewed the care plan and MDS and do agree with the plan. Patient's desire to return to the community is not assessible due to cognitive impairment.  Information reviewed:  Medications, vital signs, orders, and nursing notes.    ROS:  Unobtainable secondary to cognitive impairment.     Exam:  Vitals: /75  Pulse 75  Temp 98.2  F (36.8  C)  Resp 17  Ht 5' 1.5\" (1.562 m)  Wt 123 lb 3.2 oz (55.9 kg)  SpO2 96%  BMI 22.9 kg/m2  BMI= Body mass index is 22.9 kg/(m^2).    Resp: Effort WNL, LSCTA   CV: VSS-   Abd- soft, nontender, BS +  Musc-w/c- bilateral BKA  Psych- alert, calm,  Pleasant today      Lab/Diagnostic data:     CBC RESULTS:   Recent Labs   Lab Test  07/23/18   1650  01/05/18   0745   WBC  4.9  5.3   RBC  3.18*  2.96*   HGB  9.8*  9.3*   HCT  29.5*  28.5*   MCV  93  96   MCH  30.8  31.4   MCHC  33.2  32.6   RDW  12.3  13.9   PLT  202  168       Last Basic Metabolic Panel:  Recent Labs   Lab Test  07/23/18   1650  01/05/18   0745   NA  141  141   POTASSIUM  4.8  4.7   CHLORIDE  109  109   VIDHI  9.1  8.8   CO2  24  27   BUN  55*  37*   CR  1.59*  1.57*   GLC  154*  99       Liver Function Studies -   Recent Labs   Lab Test  01/05/18   0745  05/10/17   0610   PROTTOTAL  7.4  6.7*   ALBUMIN  3.8  3.3*   BILITOTAL  0.4  0.3   ALKPHOS  46  41   AST  20  17   ALT  22  18       TSH   Date Value Ref Range Status   07/23/2018 1.71 0.40 - 4.00 mU/L Final   03/31/2014 1.557 0.300 - 5.000 uIU/ml Final       Lab " Results   Component Value Date    A1C 7.8 01/05/2018    A1C 6.5 01/30/2017       ASSESSMENT/PLAN    Vascular dementia with behavior disturbance  - Has had an acute change in confusion with increased paranoia and exit seeking behaviors - responding somewhat to adjustment in meds and after tx of UTI. Goals of care are comfort. Progressive disease, continued global decline is anticipated.  - Continue supportive cares and treatments in LTC. With ongoing eval and consideration of memory care placement  - continue on low dose Aricept and on Seroquel.  - Monitor mood and behaviors closely- ongoing redirection, distraction, and supportive approach.       Type 2 diabetes mellitus with peripheral vascular disease (H) diabetes since 1980  - controlled as well as possible given challenges with compliance in setting of dementia  - not a candidate for oral meds  - continue on Lantus and BLG daily at alternating times. Nsg to report hypoglycemia per standard protocol  - A1C if patient will allow     Hypertensive renal disease, stage 1 through stage 4 or unspecified chronic kidney disease  - stable/controlled  - limit labs as patient usually declines   avoid nephrotoxic agents  - continue on metoprolol XL (changed from BID Coreg)  and amlodipine- hydralazine and Losartan stopped recently to decrease pill burden and simplify schedule to mainly daily med admin to maximize compliance  - VS per unit protocol     Chronic diastolic congestive heart failure (H)  - no active s/s   - continue on med regimen as above  - follow weights, VS, clinically      Chronic left sided low back pain with sciatica  - completed stress dose of steroids after low dose pred stopped and now on previous low dose pred d/t noting increased confusion after d/cd  - pain appears well controlled       Electronically signed by:  AL Rose CNP        Sincerely,        AL Rose CNP

## 2018-08-13 NOTE — LETTER
8/13/2018        RE: Cinthia Whittington  [id number]  [place] [address]          [facility] DISCHARGE SUMMARY    PATIENT'S NAME: Cinthia Whittington  YOB: 1924  MEDICAL RECORD NUMBER:  [id number]    PRIMARY CARE PROVIDER AND CLINIC RESPONSIBLE AFTER TRANSFER: Ji[facility]ot nichols [address] / [place] [address]     CODE STATUS/ADVANCE DIRECTIVES DISCUSSION:   DNR / DNI       Allergies   Allergen Reactions     Lisinopril Cough     Remeron [Mirtazapine] Anxiety     She became very angry and had altered thinking       TRANSFERRING PROVIDERS: Trinh Shahid MD  DATE OF SNF ADMISSION:  November / 15 / 2016  DATE OF SNF (anticipated) DISCHARGE: August / 17 / 2018  DISCHARGE DISPOSITION: Assisted Living: [facility] Facility: [facility] stay 11-  through 11-.    Condition on Discharge:  Transferring to [facility]  Function:  Assist with all ADLs. W/C   Cognitive Scores:  CPT 4.0, SLUMS 12/30- 2016      DISCHARGE DIAGNOSIS:   1. Vascular dementia with behavior disturbance    2. Type 2 diabetes mellitus with peripheral vascular disease (H) diabetes since 1980    3. Hypertensive renal disease, stage 1 through stage 4 or unspecified chronic kidney disease    4. Chronic diastolic congestive heart failure (H)    5. Chronic left-sided low back pain with left-sided sciatica            Issues addressed in LTC:     Vascular dementia with behavior disturbance  Progressive disease, continued global decline is anticipated. Patient with h/o refusing cares and tx and some verbal aggression. Changes have been made to patient's med regimen to allow for minimal meds and frequency to improve compliance.   Patient had an acute change with increased confusion and paranoia and new exit seeking behaviors -beginning mid July. Is responding somewhat to adjustment in meds and after tx of UTI and  with nonpharm interventions. Goals of care are comfort and son did not want patient hospitalized or more aggressive workup. Plan is to transfer to memory care/secured unit 2/2 to wandering and exit seeking behaviors  - continues on low dose Aricept and on Seroquel.  - Ns monitor mood and behaviors closely and provide ongoing redirection, distraction, and supportive approach.         Type 2 diabetes mellitus with peripheral vascular disease (H) diabetes since 1980  - patient is bilateral BKA with prosetheses  - controlled as well as possible given challenges with compliance in setting of dementia  - not a candidate for oral meds  - continues on Lantus and BLG daily at alternating times. Nsg to report hypoglycemia per standard protocol  - A1C if patient will allow  Lab Results   Component Value Date    A1C 7.8 01/05/2018    A1C 6.5 01/30/2017    A1C 7.6 09/14/2016    A1C 8.2 06/22/2016    A1C 7.5 09/23/2015           Hypertensive renal disease, stage 1 through stage 4 or unspecified chronic kidney disease  - stable/controlled  - limit labs as patient usually declines  - avoid nephrotoxic agents  - continue on metoprolol XL (changed from BID Coreg)  and amlodipine- (hydralazine and Losartan stopped recently to decrease pill burden and simplify schedule to mainly daily med admin to maximize compliance)        Chronic diastolic congestive heart failure (H)  - no active s/s   - continue on med regimen as above  - follow weights, VS, clinically       Chronic left sided low back pain with sciatica  - completed stress dose of steroids after low dose pred stopped and now on previous low dose pred d/t noting increased confusion after d/cd  - pain appears well controlled          PAST MEDICAL HISTORY:  has a past medical history of Anemia of chronic disease; ASCVD (arteriosclerotic cardiovascular disease) (10/15/2010); Blood loss anemia (2/5/2013); Carotid artery disease (H); Dementia; Diabetes mellitus (H); Diabetic macular  "edema (H); Diabetic neuropathy (H); GERD (gastroesophageal reflux disease); Hearing loss; HTN; Hypercholesteremia; Lymphedema; Multiple thyroid nodules; Posterior vitreous detachment of both eyes; PVD (peripheral vascular disease) (H) (2009); and Type 2 diabetes mellitus, uncontrolled (H).    DISCHARGE MEDICATIONS:  Current Outpatient Prescriptions   Medication Sig Dispense Refill     acetaminophen (TYLENOL) 500 MG tablet Take 500 mg by mouth every 4 hours as needed for mild pain (max 3 g / 24 hrs) Max 3g/24hrs       amLODIPine (NORVASC) 10 MG tablet Take 10 mg by mouth daily at 5PM       clopidogrel (PLAVIX) 75 MG tablet Take 1 tablet (75 mg) by mouth daily 90 tablet 1     diclofenac (VOLTAREN) 1 % GEL topical gel Apply 2 g topically 4 times daily Apply to L side of low back and into posterior L thigh       DONEPEZIL HCL PO Take 5 mg by mouth At Bedtime       Glycerin, Laxative, (GLYCERIN, ADULT,) 80.7 % SUPP Place 1 suppository rectally daily as needed (constipation)       insulin glargine (LANTUS) 100 UNIT/ML PEN Inject 25 Units Subcutaneous every morning Raise dose by one unit if AM glucoses are over 130.        METOPROLOL SUCCINATE ER PO Take 25 mg by mouth At Bedtime       nitroglycerin (NITROSTAT) 0.4 MG SL tablet Place 1 tablet (0.4 mg) under the tongue every 5 minutes as needed for chest pain If still having symptoms after 3 doses (15 minutes) call 911. 25 tablet 0     Nystatin POWD Apply topically 2 times daily as needed       PREDNISONE PO Take 2.5 mg by mouth At Bedtime        QUEtiapine Fumarate (SEROQUEL PO) Take 12.5 mg by mouth daily        QUETIAPINE FUMARATE PO Take 12.5 mg by mouth as needed       QUETIAPINE FUMARATE PO Take 25 mg by mouth At Bedtime             Controlled medications sent with patient:   not applicable/none     ROS:    Limited secondary to cognitive impairment but today pt reports \"Im fine\"    Physical Exam:   Vitals: /75  Pulse 75  Temp 98.2  F (36.8  C)  Resp 17  Wt " 117 lb 3.2 oz (53.2 kg)  SpO2 96%  BMI 21.79 kg/m2  BMI= Body mass index is 21.79 kg/(m^2).    Resp: Effort WNL, LSCTA   CV: VS as above- no edema  Abd- soft, nontender, BS +  Musc- NELSON- bilateral BKA with prosthetics- w/c  Psych- alert, calm, pleasant, confused      DISCHARGE PLAN: To A/L with current medications and treatments.   PCP in next 7 days    Current [place] scheduled appointments:  Future Appointments  Date Time Provider Department Center   9/26/2018 2:00 PM [address] [facility]       MTM referral needed and placed by this provider: No    Pending labs:   [facility] labs   CBC RESULTS:   Recent Labs   Lab Test  07/23/18   1650  01/05/18   0745   WBC  4.9  5.3   RBC  3.18*  2.96*   HGB  9.8*  9.3*   HCT  29.5*  28.5*   MCV  93  96   MCH  30.8  31.4   MCHC  33.2  32.6   RDW  12.3  13.9   PLT  202  168       Last Basic Metabolic Panel:  Recent Labs   Lab Test  07/23/18   1650  01/05/18   0745   NA  141  141   POTASSIUM  4.8  4.7   CHLORIDE  109  109   VIDHI  9.1  8.8   CO2  24  27   BUN  55*  37*   CR  1.59*  1.57*   GLC  154*  99       Liver Function Studies -   Recent Labs   Lab Test  01/05/18   0745  05/10/17   0610   PROTTOTAL  7.4  6.7*   ALBUMIN  3.8  3.3*   BILITOTAL  0.4  0.3   ALKPHOS  46  41   AST  20  17   ALT  22  18       TSH   Date Value Ref Range Status   07/23/2018 1.71 0.40 - 4.00 mU/L Final   03/31/2014 1.557 0.300 - 5.000 uIU/ml Final       Lab Results   Component Value Date    A1C 7.8 01/05/2018    A1C 6.5 01/30/2017       Discharge Treatments: as above    TOTAL DISCHARGE TIME:   Greater than 30 minutes  Electronically signed by:  AL Rose CNP      Sincerely,        AL Rose CNP

## 2018-08-13 NOTE — Clinical Note
CHA Ellison. This patient is coming to you I believe tomorrow. The plan is to transfer back to LTC after they get a wander guard system in..   She is pleasant these days following an acute mental status change. Now wanders and exit seeks. Son Ayan is HCA and very involved. Comfort care.

## 2018-08-14 NOTE — PROGRESS NOTES
[facility] DISCHARGE SUMMARY    PATIENT'S NAME: Cinthia Whittington  YOB: 1924  MEDICAL RECORD NUMBER:  [id number]    PRIMARY CARE PROVIDER AND CLINIC RESPONSIBLE AFTER TRANSFER: Scot Whitney [address] / [place] [id number]     CODE STATUS/ADVANCE DIRECTIVES DISCUSSION:   DNR / DNI       Allergies   Allergen Reactions     Lisinopril Cough     Remeron [Mirtazapine] Anxiety     She became very angry and had altered thinking       TRANSFERRING PROVIDERS: Trinh Shahid MD  DATE OF SNF ADMISSION:  November / 15 / 2016  DATE OF SNF (anticipated) DISCHARGE: August / 17 / 2018  DISCHARGE DISPOSITION: Assisted Living: [facility] Facility: [facility] stay 11-  through 11-.    Condition on Discharge:  Transferring to [facility]  Function:  Assist with all ADLs. W/C   Cognitive Scores:  CPT 4.0, SLUMS 12/30- 2016      DISCHARGE DIAGNOSIS:   1. Vascular dementia with behavior disturbance    2. Type 2 diabetes mellitus with peripheral vascular disease (H) diabetes since 1980    3. Hypertensive renal disease, stage 1 through stage 4 or unspecified chronic kidney disease    4. Chronic diastolic congestive heart failure (H)    5. Chronic left-sided low back pain with left-sided sciatica            Issues addressed in LTC:     Vascular dementia with behavior disturbance  Progressive disease, continued global decline is anticipated. Patient with h/o refusing cares and tx and some verbal aggression. Changes have been made to patient's med regimen to allow for minimal meds and frequency to improve compliance.   Patient had an acute change with increased confusion and paranoia and new exit seeking behaviors -beginning mid July. Is responding somewhat to adjustment in meds and after tx of UTI and with nonpharm interventions. Goals of care are comfort and son did not want patient hospitalized or  more aggressive workup. Plan is to transfer to memory care/secured unit 2/2 to wandering and exit seeking behaviors  - continues on low dose Aricept and on Seroquel.  - Ns monitor mood and behaviors closely and provide ongoing redirection, distraction, and supportive approach.         Type 2 diabetes mellitus with peripheral vascular disease (H) diabetes since 1980  - patient is bilateral BKA with prosetheses  - controlled as well as possible given challenges with compliance in setting of dementia  - not a candidate for oral meds  - continues on Lantus and BLG daily at alternating times. Nsg to report hypoglycemia per standard protocol  - A1C if patient will allow  Lab Results   Component Value Date    A1C 7.8 01/05/2018    A1C 6.5 01/30/2017    A1C 7.6 09/14/2016    A1C 8.2 06/22/2016    A1C 7.5 09/23/2015           Hypertensive renal disease, stage 1 through stage 4 or unspecified chronic kidney disease  - stable/controlled  - limit labs as patient usually declines  - avoid nephrotoxic agents  - continue on metoprolol XL (changed from BID Coreg)  and amlodipine- (hydralazine and Losartan stopped recently to decrease pill burden and simplify schedule to mainly daily med admin to maximize compliance)        Chronic diastolic congestive heart failure (H)  - no active s/s   - continue on med regimen as above  - follow weights, VS, clinically       Chronic left sided low back pain with sciatica  - completed stress dose of steroids after low dose pred stopped and now on previous low dose pred d/t noting increased confusion after d/cd  - pain appears well controlled          PAST MEDICAL HISTORY:  has a past medical history of Anemia of chronic disease; ASCVD (arteriosclerotic cardiovascular disease) (10/15/2010); Blood loss anemia (2/5/2013); Carotid artery disease (H); Dementia; Diabetes mellitus (H); Diabetic macular edema (H); Diabetic neuropathy (H); GERD (gastroesophageal reflux disease); Hearing loss; HTN;  "Hypercholesteremia; Lymphedema; Multiple thyroid nodules; Posterior vitreous detachment of both eyes; PVD (peripheral vascular disease) (H) (2009); and Type 2 diabetes mellitus, uncontrolled (H).    DISCHARGE MEDICATIONS:  Current Outpatient Prescriptions   Medication Sig Dispense Refill     acetaminophen (TYLENOL) 500 MG tablet Take 500 mg by mouth every 4 hours as needed for mild pain (max 3 g / 24 hrs) Max 3g/24hrs       amLODIPine (NORVASC) 10 MG tablet Take 10 mg by mouth daily at 5PM       clopidogrel (PLAVIX) 75 MG tablet Take 1 tablet (75 mg) by mouth daily 90 tablet 1     diclofenac (VOLTAREN) 1 % GEL topical gel Apply 2 g topically 4 times daily Apply to L side of low back and into posterior L thigh       DONEPEZIL HCL PO Take 5 mg by mouth At Bedtime       Glycerin, Laxative, (GLYCERIN, ADULT,) 80.7 % SUPP Place 1 suppository rectally daily as needed (constipation)       insulin glargine (LANTUS) 100 UNIT/ML PEN Inject 25 Units Subcutaneous every morning Raise dose by one unit if AM glucoses are over 130.        METOPROLOL SUCCINATE ER PO Take 25 mg by mouth At Bedtime       nitroglycerin (NITROSTAT) 0.4 MG SL tablet Place 1 tablet (0.4 mg) under the tongue every 5 minutes as needed for chest pain If still having symptoms after 3 doses (15 minutes) call 911. 25 tablet 0     Nystatin POWD Apply topically 2 times daily as needed       PREDNISONE PO Take 2.5 mg by mouth At Bedtime        QUEtiapine Fumarate (SEROQUEL PO) Take 12.5 mg by mouth daily        QUETIAPINE FUMARATE PO Take 12.5 mg by mouth as needed       QUETIAPINE FUMARATE PO Take 25 mg by mouth At Bedtime             Controlled medications sent with patient:   not applicable/none     ROS:    Limited secondary to cognitive impairment but today pt reports \"Im fine\"    Physical Exam:   Vitals: /75  Pulse 75  Temp 98.2  F (36.8  C)  Resp 17  Wt 117 lb 3.2 oz (53.2 kg)  SpO2 96%  BMI 21.79 kg/m2  BMI= Body mass index is 21.79 " kg/(m^2).    Resp: Effort WNL, LSCTA   CV: VS as above- no edema  Abd- soft, nontender, BS +  Musc- NELSON- bilateral BKA with prosthetics- w/c  Psych- alert, calm, pleasant, confused      DISCHARGE PLAN: To A/L with current medications and treatments.   PCP in next 7 days    Current [place] scheduled appointments:  Future Appointments  Date Time Provider Department Center   [facility]2018 [facility]       MTM referral needed and placed by this provider: No    Pending labs:   [facility] labs   CBC RESULTS:   Recent Labs   Lab Test  07/23/18   1650  01/05/18   0745   WBC  4.9  5.3   RBC  3.18*  2.96*   HGB  9.8*  9.3*   HCT  29.5*  28.5*   MCV  93  96   MCH  30.8  31.4   MCHC  33.2  32.6   RDW  12.3  13.9   PLT  202  168       Last Basic Metabolic Panel:  Recent Labs   Lab Test  07/23/18   1650  01/05/18   0745   NA  141  141   POTASSIUM  4.8  4.7   CHLORIDE  109  109   VIDHI  9.1  8.8   CO2  24  27   BUN  55*  37*   CR  1.59*  1.57*   GLC  154*  99       Liver Function Studies -   Recent Labs   Lab Test  01/05/18   0745  05/10/17   0610   PROTTOTAL  7.4  6.7*   ALBUMIN  3.8  3.3*   BILITOTAL  0.4  0.3   ALKPHOS  46  41   AST  20  17   ALT  22  18       TSH   Date Value Ref Range Status   07/23/2018 1.71 0.40 - 4.00 mU/L Final   03/31/2014 1.557 0.300 - 5.000 uIU/ml Final       Lab Results   Component Value Date    A1C 7.8 01/05/2018    A1C 6.5 01/30/2017       Discharge Treatments: as above    TOTAL DISCHARGE TIME:   Greater than 30 minutes  Electronically signed by:  AL Rose CNP

## 2018-08-23 NOTE — LETTER
8/23/2018        RE: Cinthia Whittington  [facility]  [address] [facility] [address]        [place] [facility]  PRIMARY CARE PROVIDER AND CLINIC:  Scot Whitney [address] [place] [address] / [place] [address]  Chief Complaint   Patient presents with     [facility] Medical Record Number:  [id number]    HPI:    Cinthia Whittington is a 89 year old  (11/5/1924),admitted to the [facility] from [facility].  [facility] stay 11/16/2016 through 08/17/2018.  Admitted to this facility for  rehab, medical management and nursing care.  HPI information obtained from: facility chart records.  Current issues are:        Vascular dementia with behavioral disturbance  Progressing and resistant to cares at times. Reports of attempts to bite, hit, verbal aggression. She was a risk for elopement and needing secured memory care unit. Per family (son) goal of care is comfort focus. Has failed discontinuing of Aricept but will attempt a slow taper as could be contributing to behaviors. Seroquel added with good results.    Type 2 diabetes mellitus with peripheral vascular disease (H) diabetes since 1980  Lantus daily and not a good candidate for oral medication 2/2 compliance. Pill load reduction with behaviors. Last A1C within limits. BG checks daily at alternating times. Currently average is 229 from 5 readings.    Status post bilateral below knee amputation (H)  Joe lift for transfers and no longer ambulates according to son. Previously tranfers with assist of 2 and pivot disc.     Chronic left-sided low back pain with left-sided sciatica  History of and complained of pain with any touching to all areas of body today. Appears behaviors could play a role. Scheduled low dose of prednisone, prn Tylenol, Voltaren gel was qid but now qid prn 2/2 increased agitation with cares.     Hypertensive renal disease, stage 1 through stage 4 or unspecified chronic kidney  disease  Chronic diastolic congestive heart failure (H)  History of heart stents  Has been sable- labs limited 2/2 to goals of care and refusal from resident. On metoprolol XL (previously on BID Coreg) and amlodipine (hydralazine and Losartan d/c'd to lower pill burden). Off ASA since 2016 2/2 anemia but continues on Plavix.     CODE STATUS/ADVANCE DIRECTIVES DISCUSSION:   DNR / DNI  Patient's living condition: lives in an assisted living facility    ALLERGIES:Lisinopril and Remeron [mirtazapine]  PAST MEDICAL HISTORY:  has a past medical history of Anemia of chronic disease; ASCVD (arteriosclerotic cardiovascular disease) (10/15/2010); Blood loss anemia (2/5/2013); Carotid artery disease (H); Dementia; Diabetes mellitus (H); Diabetic macular edema (H); Diabetic neuropathy (H); GERD (gastroesophageal reflux disease); Hearing loss; HTN; Hypercholesteremia; Lymphedema; Multiple thyroid nodules; Posterior vitreous detachment of both eyes; PVD (peripheral vascular disease) (H) (2009); and Type 2 diabetes mellitus, uncontrolled (H).  PAST SURGICAL HISTORY:  has a past surgical history that includes SPINE FUSION,ANTER,3 SGMTS; hysterectomy, pap no longer indicated; SLING OPERATION FOR STRESS INCONTINENCE; VEIN IN SITU BYPASS GRAFT,FEM-POP (4/9/10); Amputate leg below knee (1/30/2013); cataract iol, rt/lt (2-29-00,3-27-00); Heart Cath, Angioplasty (8/2010); Amputate leg below knee (2010); Eye surgery; R TKA; and history of low back surgery.  FAMILY HISTORY: family history includes Diabetes in her mother; Family History Negative in her brother, brother, brother, daughter, and son; Respiratory in her father.  SOCIAL HISTORY:  reports that she quit smoking about 39 years ago. She has never used smokeless tobacco. She reports that she drinks about 1.8 oz of alcohol per week  She reports that she does not use illicit drugs.    Post Discharge Medication Reconciliation Status: discharge medications reconciled and changed, per  note/orders (see AVS).  Current Outpatient Prescriptions   Medication Sig Dispense Refill     acetaminophen (TYLENOL) 500 MG tablet Take 500 mg by mouth every 4 hours as needed for mild pain (max 3 g / 24 hrs) Max 3g/24hrs       amLODIPine (NORVASC) 10 MG tablet Take 10 mg by mouth daily at 5PM       clopidogrel (PLAVIX) 75 MG tablet Take 1 tablet (75 mg) by mouth daily 90 tablet 1     diclofenac (VOLTAREN) 1 % GEL topical gel Apply 2 g topically 4 times daily as needed Apply to L side of low back and into posterior L thigh        DONEPEZIL HCL PO Take 2.5 mg by mouth At Bedtime        Glycerin, Laxative, (GLYCERIN, ADULT,) 80.7 % SUPP Place 1 suppository rectally daily as needed (constipation)       insulin glargine (LANTUS) 100 UNIT/ML PEN Inject 25 Units Subcutaneous every morning        METOPROLOL SUCCINATE ER PO Take 25 mg by mouth At Bedtime       nitroglycerin (NITROSTAT) 0.4 MG SL tablet Place 1 tablet (0.4 mg) under the tongue every 5 minutes as needed for chest pain If still having symptoms after 3 doses (15 minutes) call 911. 25 tablet 0     Nystatin POWD Apply topically 2 times daily as needed       PREDNISONE PO Take 2.5 mg by mouth At Bedtime        QUEtiapine Fumarate (SEROQUEL PO) Take 25 mg by mouth 2 times daily        QUETIAPINE FUMARATE PO Take 12.5 mg by mouth as needed         ROS:  Unobtainable secondary to cognitive impairment.     Exam:  /65  Pulse 85  Temp 98.3  F (36.8  C)  Resp 16  SpO2 97%  GENERAL APPEARANCE:  Alert, in no distress, uncooperative  ENT:  refused exam  EYES:  EOM, conjunctivae, lids, pupils and irises normal  RESP:  respiratory effort and palpation of chest normal, lungs clear to auscultation   CV:  Palpation and auscultation of heart done , regular rate and rhythm, no murmur, rub, or gallop, paced  ABDOMEN:  normal bowel sounds, soft, nontender, no hepatosplenomegaly or other masses  M/S:   WC ambulation with assist. BKA with bilateral prosthetics   SKIN:  dry,  refused most of exam  NEURO:   ARABELLA  PSYCH:  insight and judgement impaired, memory impaired , agitated at times    Lab/Diagnostic data:    CBC RESULTS:   Recent Labs   Lab Test  07/23/18   1650  01/05/18   0745   WBC  4.9  5.3   RBC  3.18*  2.96*   HGB  9.8*  9.3*   HCT  29.5*  28.5*   MCV  93  96   MCH  30.8  31.4   MCHC  33.2  32.6   RDW  12.3  13.9   PLT  202  168       Last Basic Metabolic Panel:  Recent Labs   Lab Test  07/23/18   1650  01/05/18   0745   NA  141  141   POTASSIUM  4.8  4.7   CHLORIDE  109  109   VIDHI  9.1  8.8   CO2  24  27   BUN  55*  37*   CR  1.59*  1.57*   GLC  154*  99       Liver Function Studies -   Recent Labs   Lab Test  01/05/18   0745  05/10/17   0610   PROTTOTAL  7.4  6.7*   ALBUMIN  3.8  3.3*   BILITOTAL  0.4  0.3   ALKPHOS  46  41   AST  20  17   ALT  22  18       TSH   Date Value Ref Range Status   07/23/2018 1.71 0.40 - 4.00 mU/L Final   03/31/2014 1.557 0.300 - 5.000 uIU/ml Final       Lab Results   Component Value Date    A1C 7.8 01/05/2018    A1C 6.5 01/30/2017       ASSESSMENT/PLAN:  (F01.51) Vascular dementia with behavioral disturbance  (primary encounter diagnosis)  Comment: Advancing  Plan:  -Scheduled Seroquel increase to 25 mg po BID  -Seroquel 12.5 mg po daily prn  -Donepezil reduced to 2.5 mg po every day     (E11.51) Type 2 diabetes mellitus with peripheral vascular disease (H) diabetes since 1980  Comment: Chronic  Plan:   -Lantus 25 units subcutaneously daily  -Consider increase if BG remain elevated. (Only have 5 readings)    (Z89.512,  Z89.511) Status post bilateral below knee amputation (H)  Comment:Chronic  Plan:   -Staff don and doff prosthetics, monitor skin integrity  -prescription for prosthetics components prn    (M54.42,  G89.29) Chronic left-sided low back pain with left-sided sciatica  Comment: history of  Plan:   -Continue current plan of care. Consider scheduling back Voltaren if s/s of pain increase    (I12.9) Hypertensive renal disease, stage 1  through stage 4 or unspecified chronic kidney disease  (I50.32) Chronic diastolic congestive heart failure (H)  (Z95.5) History of heart artery stents  Comment: Chronic  Plan:   -Amlodipine 10 mg po daily  -Plavix 75 mg po daily  -Metoprolol XL 25 mg po daily  -nitroglycerin prn   -BMP, CBC periodically as tolerated         Total time with an established patient visit in the assisted livin minutes including discussions about the POC and care coordination with the family, son-Ayan. Greater than 50% of total time spent with counseling and coordinating care due to new to memory care, many questions and concerns with planning cares, review of POLST.    Electronically signed by:  AL Carrington CNP                    Sincerely,        AL Carrington CNP

## 2018-08-23 NOTE — PROGRESS NOTES
[place] [facility]  PRIMARY CARE PROVIDER AND CLINIC:  JiScot nichols Lorenza [address] / [place] [address]  Chief Complaint   Patient presents with     [facility] Medical Record Number:  [id number]    HPI:    Cinthia Whittington is a 89 year old  (11/5/1924),admitted to the [facility] from [facility].  [facility] stay 11/16/2016 through 08/17/2018.  Admitted to this facility for  rehab, medical management and nursing care.  HPI information obtained from: facility chart records.  Current issues are:        Vascular dementia with behavioral disturbance  Progressing and resistant to cares at times. Reports of attempts to bite, hit, verbal aggression. She was a risk for elopement and needing secured memory care unit. Per family (son) goal of care is comfort focus. Has failed discontinuing of Aricept but will attempt a slow taper as could be contributing to behaviors. Seroquel added with good results.    Type 2 diabetes mellitus with peripheral vascular disease (H) diabetes since 1980  Lantus daily and not a good candidate for oral medication 2/2 compliance. Pill load reduction with behaviors. Last A1C within limits. BG checks daily at alternating times. Currently average is 229 from 5 readings.    Status post bilateral below knee amputation (H)  Joe lift for transfers and no longer ambulates according to son. Previously tranfers with assist of 2 and pivot disc.     Chronic left-sided low back pain with left-sided sciatica  History of and complained of pain with any touching to all areas of body today. Appears behaviors could play a role. Scheduled low dose of prednisone, prn Tylenol, Voltaren gel was qid but now qid prn 2/2 increased agitation with cares.     Hypertensive renal disease, stage 1 through stage 4 or unspecified chronic kidney disease  Chronic diastolic congestive heart failure (H)  History of heart stents  Has been sable- labs limited  2/2 to goals of care and refusal from resident. On metoprolol XL (previously on BID Coreg) and amlodipine (hydralazine and Losartan d/c'd to lower pill burden). Off ASA since 2016 2/2 anemia but continues on Plavix.     CODE STATUS/ADVANCE DIRECTIVES DISCUSSION:   DNR / DNI  Patient's living condition: lives in an assisted living facility    ALLERGIES:Lisinopril and Remeron [mirtazapine]  PAST MEDICAL HISTORY:  has a past medical history of Anemia of chronic disease; ASCVD (arteriosclerotic cardiovascular disease) (10/15/2010); Blood loss anemia (2/5/2013); Carotid artery disease (H); Dementia; Diabetes mellitus (H); Diabetic macular edema (H); Diabetic neuropathy (H); GERD (gastroesophageal reflux disease); Hearing loss; HTN; Hypercholesteremia; Lymphedema; Multiple thyroid nodules; Posterior vitreous detachment of both eyes; PVD (peripheral vascular disease) (H) (2009); and Type 2 diabetes mellitus, uncontrolled (H).  PAST SURGICAL HISTORY:  has a past surgical history that includes SPINE FUSION,ANTER,3 SGMTS; hysterectomy, pap no longer indicated; SLING OPERATION FOR STRESS INCONTINENCE; VEIN IN SITU BYPASS GRAFT,FEM-POP (4/9/10); Amputate leg below knee (1/30/2013); cataract iol, rt/lt (2-29-00,3-27-00); Heart Cath, Angioplasty (8/2010); Amputate leg below knee (2010); Eye surgery; R TKA; and history of low back surgery.  FAMILY HISTORY: family history includes Diabetes in her mother; Family History Negative in her brother, brother, brother, daughter, and son; Respiratory in her father.  SOCIAL HISTORY:  reports that she quit smoking about 39 years ago. She has never used smokeless tobacco. She reports that she drinks about 1.8 oz of alcohol per week  She reports that she does not use illicit drugs.    Post Discharge Medication Reconciliation Status: discharge medications reconciled and changed, per note/orders (see AVS).  Current Outpatient Prescriptions   Medication Sig Dispense Refill     acetaminophen  (TYLENOL) 500 MG tablet Take 500 mg by mouth every 4 hours as needed for mild pain (max 3 g / 24 hrs) Max 3g/24hrs       amLODIPine (NORVASC) 10 MG tablet Take 10 mg by mouth daily at 5PM       clopidogrel (PLAVIX) 75 MG tablet Take 1 tablet (75 mg) by mouth daily 90 tablet 1     diclofenac (VOLTAREN) 1 % GEL topical gel Apply 2 g topically 4 times daily as needed Apply to L side of low back and into posterior L thigh        DONEPEZIL HCL PO Take 2.5 mg by mouth At Bedtime        Glycerin, Laxative, (GLYCERIN, ADULT,) 80.7 % SUPP Place 1 suppository rectally daily as needed (constipation)       insulin glargine (LANTUS) 100 UNIT/ML PEN Inject 25 Units Subcutaneous every morning        METOPROLOL SUCCINATE ER PO Take 25 mg by mouth At Bedtime       nitroglycerin (NITROSTAT) 0.4 MG SL tablet Place 1 tablet (0.4 mg) under the tongue every 5 minutes as needed for chest pain If still having symptoms after 3 doses (15 minutes) call 911. 25 tablet 0     Nystatin POWD Apply topically 2 times daily as needed       PREDNISONE PO Take 2.5 mg by mouth At Bedtime        QUEtiapine Fumarate (SEROQUEL PO) Take 25 mg by mouth 2 times daily        QUETIAPINE FUMARATE PO Take 12.5 mg by mouth as needed         ROS:  Unobtainable secondary to cognitive impairment.     Exam:  /65  Pulse 85  Temp 98.3  F (36.8  C)  Resp 16  SpO2 97%  GENERAL APPEARANCE:  Alert, in no distress, uncooperative  ENT:  refused exam  EYES:  EOM, conjunctivae, lids, pupils and irises normal  RESP:  respiratory effort and palpation of chest normal, lungs clear to auscultation   CV:  Palpation and auscultation of heart done , regular rate and rhythm, no murmur, rub, or gallop, paced  ABDOMEN:  normal bowel sounds, soft, nontender, no hepatosplenomegaly or other masses  M/S:   WC ambulation with assist. BKA with bilateral prosthetics   SKIN:  dry, refused most of exam  NEURO:   ARABELLA  PSYCH:  insight and judgement impaired, memory impaired , agitated at  times    Lab/Diagnostic data:    CBC RESULTS:   Recent Labs   Lab Test  07/23/18   1650  01/05/18   0745   WBC  4.9  5.3   RBC  3.18*  2.96*   HGB  9.8*  9.3*   HCT  29.5*  28.5*   MCV  93  96   MCH  30.8  31.4   MCHC  33.2  32.6   RDW  12.3  13.9   PLT  202  168       Last Basic Metabolic Panel:  Recent Labs   Lab Test  07/23/18   1650  01/05/18   0745   NA  141  141   POTASSIUM  4.8  4.7   CHLORIDE  109  109   VIDHI  9.1  8.8   CO2  24  27   BUN  55*  37*   CR  1.59*  1.57*   GLC  154*  99       Liver Function Studies -   Recent Labs   Lab Test  01/05/18   0745  05/10/17   0610   PROTTOTAL  7.4  6.7*   ALBUMIN  3.8  3.3*   BILITOTAL  0.4  0.3   ALKPHOS  46  41   AST  20  17   ALT  22  18       TSH   Date Value Ref Range Status   07/23/2018 1.71 0.40 - 4.00 mU/L Final   03/31/2014 1.557 0.300 - 5.000 uIU/ml Final       Lab Results   Component Value Date    A1C 7.8 01/05/2018    A1C 6.5 01/30/2017       ASSESSMENT/PLAN:  (F01.51) Vascular dementia with behavioral disturbance  (primary encounter diagnosis)  Comment: Advancing  Plan:  -Scheduled Seroquel increase to 25 mg po BID  -Seroquel 12.5 mg po daily prn  -Donepezil reduced to 2.5 mg po every day     (E11.51) Type 2 diabetes mellitus with peripheral vascular disease (H) diabetes since 1980  Comment: Chronic  Plan:   -Lantus 25 units subcutaneously daily  -Consider increase if BG remain elevated. (Only have 5 readings)    (Z89.512,  Z89.511) Status post bilateral below knee amputation (H)  Comment:Chronic  Plan:   -Staff don and doff prosthetics, monitor skin integrity  -prescription for prosthetics components prn    (M54.42,  G89.29) Chronic left-sided low back pain with left-sided sciatica  Comment: history of  Plan:   -Continue current plan of care. Consider scheduling back Voltaren if s/s of pain increase    (I12.9) Hypertensive renal disease, stage 1 through stage 4 or unspecified chronic kidney disease  (I50.32) Chronic diastolic congestive heart failure  (H)  (Z95.5) History of heart artery stents  Comment: Chronic  Plan:   -Amlodipine 10 mg po daily  -Plavix 75 mg po daily  -Metoprolol XL 25 mg po daily  -nitroglycerin prn   -BMP, CBC periodically as tolerated         Total time with an established patient visit in the assisted livin minutes including discussions about the POC and care coordination with the family, son-Ayan. Greater than 50% of total time spent with counseling and coordinating care due to new to memory care, many questions and concerns with planning cares, review of POLST.    Electronically signed by:  AL Carrington CNP

## 2018-08-24 PROBLEM — F01.518 VASCULAR DEMENTIA WITH BEHAVIORAL DISTURBANCE (H): Status: ACTIVE | Noted: 2018-01-01

## 2018-09-06 NOTE — LETTER
9/6/2018        RE: Cinthia Whittington  [facility]  [address] [address]        [place] [facility]    Chief Complaint   Patient presents with     Nursing Home Acute     Home Care/Hospice       [place] Medical Record Number:  [id number]    HPI:    Cinthia Whittington is a 89 year old  (11/5/1924), who is being seen today for an episodic care visit at [facility].  HPI information obtained from: facility chart records.Today's concern is:    Declining functional status  Contacted by staff yesterday for change in condition. Newly enrolled in hospice and new to memory care AL from LT resident has ongoing functional decline with loss of weight, dysphagia. New onset lethargy and odor to urine but unable to cath for sample. Antibiotic with enrollment to hospice started yesterday but unable or unwilling to consistently take. Today seen in bed she appears starting decline to end. No eye contact, no verbal or non-verbal indicators of pain but with elevated respiratory status. Skin warm, no mottling noted. Reviewed with her POA (son-Ayan) and discontinuing all non hospice medications. Adjustment to morphine frequency.       ALLERGIES: Lisinopril and Remeron [mirtazapine]  Past Medical, Surgical, Family and Social History reviewed and updated in [employer].    Current Outpatient Prescriptions   Medication Sig Dispense Refill     acetaminophen (TYLENOL) 325 MG Suppository Place 325 mg rectally every 4 hours as needed for fever       acetaminophen (TYLENOL) 500 MG tablet Take 500 mg by mouth every 4 hours as needed for mild pain (max 3 g / 24 hrs) Max 3g/24hrs       amLODIPine (NORVASC) 10 MG tablet Take 10 mg by mouth daily at 5PM       atropine 1 % ophthalmic solution Place 2 drops under the tongue every 2 hours as needed for secretions       bisacodyl (DULCOLAX) 10 MG Suppository Place 10 mg rectally daily as needed for constipation       clopidogrel  (PLAVIX) 75 MG tablet Take 1 tablet (75 mg) by mouth daily 90 tablet 1     diclofenac (VOLTAREN) 1 % GEL topical gel Apply 2 g topically 4 times daily as needed Apply to L side of low back and into posterior L thigh        DONEPEZIL HCL PO Take 2.5 mg by mouth At Bedtime        Glycerin, Laxative, (GLYCERIN, ADULT,) 80.7 % SUPP Place 1 suppository rectally daily as needed (constipation)       insulin glargine (LANTUS) 100 UNIT/ML PEN Inject 25 Units Subcutaneous every morning        LORAZEPAM PO Take 0.25 mg by mouth every 4 hours as needed for anxiety       METOPROLOL SUCCINATE ER PO Take 25 mg by mouth At Bedtime       MORPHINE SULFATE PO Take 2.5 mg by mouth every 2 hours as needed for moderate to severe pain       nitroglycerin (NITROSTAT) 0.4 MG SL tablet Place 1 tablet (0.4 mg) under the tongue every 5 minutes as needed for chest pain If still having symptoms after 3 doses (15 minutes) call 911. 25 tablet 0     Nystatin POWD Apply topically 2 times daily as needed       PREDNISONE PO Take 2.5 mg by mouth At Bedtime        QUEtiapine Fumarate (SEROQUEL PO) Take 25 mg by mouth daily        QUETIAPINE FUMARATE PO Take 12.5 mg by mouth daily AND daily PRN       sulfamethoxazole-trimethoprim (BACTRIM DS/SEPTRA DS) 800-160 MG per tablet Take 1 tablet by mouth 2 times daily       Patient Active Problem List   Diagnosis     Dementia, SLUMS 12/30; CPT 4.0/ 5.6>> needs 24 hrs care     ASCVD (arteriosclerotic cardiovascular disease)     GERD (gastroesophageal reflux disease)     S/P angioplasty: Left SFA/Popliteal 1/18/13     Carotid artery disease (H)     Multiple thyroid nodules     Cardiac pacemaker in situ     Type 2 diabetes mellitus with stage 3 chronic kidney disease (H)     Status post bilateral below knee amputation (H)     Type 2 diabetes mellitus with peripheral vascular disease (H) diabetes since 1980     Muscle pain     History of heart artery stent     ACP (advance care planning)     Slow transit  constipation     Chronic diastolic congestive heart failure (H)     Hyperglycemia     Persistent insomnia     Chronic left-sided low back pain with left-sided sciatica     Paranoia (H)     Diabetic macular edema, 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- is stable per exam     Diabetic retinopathy of both eyes (H) 1- stable per exam     Nasal congestion     Lopez     Hypertensive kidney disease with CKD (chronic kidney disease)     HTN, goal below 150/90     Vascular dementia with behavioral disturbance       Medications reviewed:  Medications reconciled to facility chart and changes were made to reflect current medications as identified as above med list. Below are the changes that were made:   Medications stopped since last EPIC medication reconciliation:   There are no discontinued medications.    Medications started since last [facility] medication reconciliation:  Orders Placed This Encounter   Medications     acetaminophen (TYLENOL) 325 MG Suppository     Sig: Place 325 mg rectally every 4 hours as needed for fever     atropine 1 % ophthalmic solution     Sig: Place 2 drops under the tongue every 2 hours as needed for secretions     sulfamethoxazole-trimethoprim (BACTRIM DS/SEPTRA DS) 800-160 MG per tablet     Sig: Take 1 tablet by mouth 2 times daily     bisacodyl (DULCOLAX) 10 MG Suppository     Sig: Place 10 mg rectally daily as needed for constipation     LORAZEPAM PO     Sig: Take 0.25 mg by mouth every 4 hours as needed for anxiety     MORPHINE SULFATE PO     Sig: Take 2.5 mg by mouth every 2 hours as needed for moderate to severe pain       ROS:  Unobtainable secondary to cognitive impairment.      Physical Exam:  /40  Pulse 52  Temp 98.7  F (37.1  C)  Resp (!) 48  SpO2 98%  GENERAL APPEARANCE:  Alert, in no distress  ENT:  refused exam  EYES:  ARABELLA- distant gaze  RESP:  respiratory effort and palpation of chest elevated, lungs clear to auscultation   CV:  Palpation and auscultation of heart done , regular rate  and rhythm, no murmur, rub, or gallop, paced  ABDOMEN:  normal bowel sounds, soft, nontender, no hepatosplenomegaly or other masses  M/S: in hospital bed- BKA without bilateral prosthetics   SKIN:  dry,  warm  NEURO:   ARABELLA  PSYCH:  insight and judgement impaired, memory impaired       Recent Labs:     CBC RESULTS:   Recent Labs   Lab Test  07/23/18   1650  01/05/18   0745   WBC  4.9  5.3   RBC  3.18*  2.96*   HGB  9.8*  9.3*   HCT  29.5*  28.5*   MCV  93  96   MCH  30.8  31.4   MCHC  33.2  32.6   RDW  12.3  13.9   PLT  202  168       Last Basic Metabolic Panel:  Recent Labs   Lab Test  07/23/18   1650  01/05/18   0745   NA  141  141   POTASSIUM  4.8  4.7   CHLORIDE  109  109   VIDHI  9.1  8.8   CO2  24  27   BUN  55*  37*   CR  1.59*  1.57*   GLC  154*  99       Liver Function Studies -   Recent Labs   Lab Test  01/05/18   0745  05/10/17   0610   PROTTOTAL  7.4  6.7*   ALBUMIN  3.8  3.3*   BILITOTAL  0.4  0.3   ALKPHOS  46  41   AST  20  17   ALT  22  18       TSH   Date Value Ref Range Status   07/23/2018 1.71 0.40 - 4.00 mU/L Final   03/31/2014 1.557 0.300 - 5.000 uIU/ml Final       Lab Results   Component Value Date    A1C 7.8 01/05/2018    A1C 6.5 01/30/2017       Assessment/Plan:  (R53.81) Declining functional status  (primary encounter diagnosis)  Comment: Declining to end of life  Plan:   -Reviewed plan of care with family  -hospice to follow  -discontinuing all non-hospice medications  -repositioning q2H and oral cares  -morphine 2.5 mg po/sL q2H       Electronically signed by  AL Carrington CNP                      Sincerely,        AL Carrington CNP

## 2018-09-06 NOTE — PROGRESS NOTES
[place] [facility]    Chief Complaint   Patient presents with     Nursing Home Acute     Home Care/Hospice       [place] Medical Record Number:  [id number]    HPI:    Cinthia Whittington is a 89 year old  (11/5/1924), who is being seen today for an episodic care visit at [facility].  HPI information obtained from: facility chart records.Today's concern is:    Declining functional status  Contacted by staff yesterday for change in condition. Newly enrolled in hospice and new to memory care AL from LT resident has ongoing functional decline with loss of weight, dysphagia. New onset lethargy and odor to urine but unable to cath for sample. Antibiotic with enrollment to hospice started yesterday but unable or unwilling to consistently take. Today seen in bed she appears starting decline to end. No eye contact, no verbal or non-verbal indicators of pain but with elevated respiratory status. Skin warm, no mottling noted. Reviewed with her POA (son-Ayan) and discontinuing all non hospice medications. Adjustment to morphine frequency.       ALLERGIES: Lisinopril and Remeron [mirtazapine]  Past Medical, Surgical, Family and Social History reviewed and updated in [employer].    Current Outpatient Prescriptions   Medication Sig Dispense Refill     acetaminophen (TYLENOL) 325 MG Suppository Place 325 mg rectally every 4 hours as needed for fever       acetaminophen (TYLENOL) 500 MG tablet Take 500 mg by mouth every 4 hours as needed for mild pain (max 3 g / 24 hrs) Max 3g/24hrs       amLODIPine (NORVASC) 10 MG tablet Take 10 mg by mouth daily at 5PM       atropine 1 % ophthalmic solution Place 2 drops under the tongue every 2 hours as needed for secretions       bisacodyl (DULCOLAX) 10 MG Suppository Place 10 mg rectally daily as needed for constipation       clopidogrel (PLAVIX) 75 MG tablet Take 1 tablet (75 mg) by mouth daily 90 tablet 1     diclofenac (VOLTAREN) 1 % GEL topical  gel Apply 2 g topically 4 times daily as needed Apply to L side of low back and into posterior L thigh        DONEPEZIL HCL PO Take 2.5 mg by mouth At Bedtime        Glycerin, Laxative, (GLYCERIN, ADULT,) 80.7 % SUPP Place 1 suppository rectally daily as needed (constipation)       insulin glargine (LANTUS) 100 UNIT/ML PEN Inject 25 Units Subcutaneous every morning        LORAZEPAM PO Take 0.25 mg by mouth every 4 hours as needed for anxiety       METOPROLOL SUCCINATE ER PO Take 25 mg by mouth At Bedtime       MORPHINE SULFATE PO Take 2.5 mg by mouth every 2 hours as needed for moderate to severe pain       nitroglycerin (NITROSTAT) 0.4 MG SL tablet Place 1 tablet (0.4 mg) under the tongue every 5 minutes as needed for chest pain If still having symptoms after 3 doses (15 minutes) call 911. 25 tablet 0     Nystatin POWD Apply topically 2 times daily as needed       PREDNISONE PO Take 2.5 mg by mouth At Bedtime        QUEtiapine Fumarate (SEROQUEL PO) Take 25 mg by mouth daily        QUETIAPINE FUMARATE PO Take 12.5 mg by mouth daily AND daily PRN       sulfamethoxazole-trimethoprim (BACTRIM DS/SEPTRA DS) 800-160 MG per tablet Take 1 tablet by mouth 2 times daily       Patient Active Problem List   Diagnosis     Dementia, SLUMS 12/30; CPT 4.0/ 5.6>> needs 24 hrs care     ASCVD (arteriosclerotic cardiovascular disease)     GERD (gastroesophageal reflux disease)     S/P angioplasty: Left SFA/Popliteal 1/18/13     Carotid artery disease (H)     Multiple thyroid nodules     Cardiac pacemaker in situ     Type 2 diabetes mellitus with stage 3 chronic kidney disease (H)     Status post bilateral below knee amputation (H)     Type 2 diabetes mellitus with peripheral vascular disease (H) diabetes since 1980     Muscle pain     History of heart artery stent     ACP (advance care planning)     Slow transit constipation     Chronic diastolic congestive heart failure (H)     Hyperglycemia     Persistent insomnia     Chronic  left-sided low back pain with left-sided sciatica     Paranoia (H)     Diabetic macular edema, 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- is stable per exam     Diabetic retinopathy of both eyes (H) 1- stable per exam     Nasal congestion     Lopez     Hypertensive kidney disease with CKD (chronic kidney disease)     HTN, goal below 150/90     Vascular dementia with behavioral disturbance       Medications reviewed:  Medications reconciled to facility chart and changes were made to reflect current medications as identified as above med list. Below are the changes that were made:   Medications stopped since last EPIC medication reconciliation:   There are no discontinued medications.    Medications started since last [facility] medication reconciliation:  Orders Placed This Encounter   Medications     acetaminophen (TYLENOL) 325 MG Suppository     Sig: Place 325 mg rectally every 4 hours as needed for fever     atropine 1 % ophthalmic solution     Sig: Place 2 drops under the tongue every 2 hours as needed for secretions     sulfamethoxazole-trimethoprim (BACTRIM DS/SEPTRA DS) 800-160 MG per tablet     Sig: Take 1 tablet by mouth 2 times daily     bisacodyl (DULCOLAX) 10 MG Suppository     Sig: Place 10 mg rectally daily as needed for constipation     LORAZEPAM PO     Sig: Take 0.25 mg by mouth every 4 hours as needed for anxiety     MORPHINE SULFATE PO     Sig: Take 2.5 mg by mouth every 2 hours as needed for moderate to severe pain       ROS:  Unobtainable secondary to cognitive impairment.      Physical Exam:  /40  Pulse 52  Temp 98.7  F (37.1  C)  Resp (!) 48  SpO2 98%  GENERAL APPEARANCE:  Alert, in no distress  ENT:  refused exam  EYES:  ARABELLA- distant gaze  RESP:  respiratory effort and palpation of chest elevated, lungs clear to auscultation   CV:  Palpation and auscultation of heart done , regular rate and rhythm, no murmur, rub, or gallop, paced  ABDOMEN:  normal bowel sounds, soft, nontender, no hepatosplenomegaly  or other masses  M/S: in hospital bed- BKA without bilateral prosthetics   SKIN:  dry, warm  NEURO:   ARABELLA  PSYCH:  insight and judgement impaired, memory impaired       Recent Labs:     CBC RESULTS:   Recent Labs   Lab Test  07/23/18   1650  01/05/18   0745   WBC  4.9  5.3   RBC  3.18*  2.96*   HGB  9.8*  9.3*   HCT  29.5*  28.5*   MCV  93  96   MCH  30.8  31.4   MCHC  33.2  32.6   RDW  12.3  13.9   PLT  202  168       Last Basic Metabolic Panel:  Recent Labs   Lab Test  07/23/18   1650  01/05/18   0745   NA  141  141   POTASSIUM  4.8  4.7   CHLORIDE  109  109   VIDHI  9.1  8.8   CO2  24  27   BUN  55*  37*   CR  1.59*  1.57*   GLC  154*  99       Liver Function Studies -   Recent Labs   Lab Test  01/05/18   0745  05/10/17   0610   PROTTOTAL  7.4  6.7*   ALBUMIN  3.8  3.3*   BILITOTAL  0.4  0.3   ALKPHOS  46  41   AST  20  17   ALT  22  18       TSH   Date Value Ref Range Status   07/23/2018 1.71 0.40 - 4.00 mU/L Final   03/31/2014 1.557 0.300 - 5.000 uIU/ml Final       Lab Results   Component Value Date    A1C 7.8 01/05/2018    A1C 6.5 01/30/2017       Assessment/Plan:  (R53.81) Declining functional status  (primary encounter diagnosis)  Comment: Declining to end of life  Plan:   -Reviewed plan of care with family  -hospice to follow  -discontinuing all non-hospice medications  -repositioning q2H and oral cares  -morphine 2.5 mg po/sL q2H       Electronically signed by  AL Carrington CNP